# Patient Record
Sex: MALE | Race: WHITE | NOT HISPANIC OR LATINO | Employment: OTHER | URBAN - METROPOLITAN AREA
[De-identification: names, ages, dates, MRNs, and addresses within clinical notes are randomized per-mention and may not be internally consistent; named-entity substitution may affect disease eponyms.]

---

## 2017-03-31 ENCOUNTER — ALLSCRIPTS OFFICE VISIT (OUTPATIENT)
Dept: OTHER | Facility: OTHER | Age: 47
End: 2017-03-31

## 2017-12-27 ENCOUNTER — GENERIC CONVERSION - ENCOUNTER (OUTPATIENT)
Dept: OTHER | Facility: OTHER | Age: 47
End: 2017-12-27

## 2018-01-12 VITALS
SYSTOLIC BLOOD PRESSURE: 132 MMHG | OXYGEN SATURATION: 98 % | TEMPERATURE: 97.3 F | DIASTOLIC BLOOD PRESSURE: 94 MMHG | HEART RATE: 82 BPM | WEIGHT: 187.44 LBS

## 2018-01-24 VITALS
HEART RATE: 79 BPM | DIASTOLIC BLOOD PRESSURE: 98 MMHG | WEIGHT: 178 LBS | SYSTOLIC BLOOD PRESSURE: 140 MMHG | OXYGEN SATURATION: 97 % | TEMPERATURE: 97.7 F | BODY MASS INDEX: 27.94 KG/M2 | HEIGHT: 67 IN | RESPIRATION RATE: 14 BRPM

## 2018-04-30 DIAGNOSIS — K21.9 GASTROESOPHAGEAL REFLUX DISEASE, ESOPHAGITIS PRESENCE NOT SPECIFIED: Primary | ICD-10-CM

## 2018-04-30 RX ORDER — OMEPRAZOLE 40 MG/1
CAPSULE, DELAYED RELEASE ORAL
COMMUNITY
Start: 2012-05-25 | End: 2018-04-30 | Stop reason: SDUPTHER

## 2018-05-01 RX ORDER — OMEPRAZOLE 40 MG/1
40 CAPSULE, DELAYED RELEASE ORAL DAILY
Qty: 30 CAPSULE | Refills: 0 | Status: SHIPPED | OUTPATIENT
Start: 2018-05-01 | End: 2018-08-02 | Stop reason: SDUPTHER

## 2018-06-20 ENCOUNTER — OFFICE VISIT (OUTPATIENT)
Dept: FAMILY MEDICINE CLINIC | Facility: OTHER | Age: 48
End: 2018-06-20
Payer: COMMERCIAL

## 2018-06-20 VITALS
TEMPERATURE: 98.3 F | HEIGHT: 67 IN | WEIGHT: 180.06 LBS | OXYGEN SATURATION: 96 % | SYSTOLIC BLOOD PRESSURE: 114 MMHG | HEART RATE: 96 BPM | BODY MASS INDEX: 28.26 KG/M2 | DIASTOLIC BLOOD PRESSURE: 80 MMHG

## 2018-06-20 DIAGNOSIS — E78.1 ESSENTIAL HYPERTRIGLYCERIDEMIA: ICD-10-CM

## 2018-06-20 DIAGNOSIS — R53.83 FATIGUE, UNSPECIFIED TYPE: ICD-10-CM

## 2018-06-20 DIAGNOSIS — Z00.00 WELL ADULT EXAM: Primary | ICD-10-CM

## 2018-06-20 DIAGNOSIS — Z13.1 SCREENING FOR DIABETES MELLITUS: ICD-10-CM

## 2018-06-20 DIAGNOSIS — K11.20 SUBMANDIBULAR GLAND INFLAMMATION: ICD-10-CM

## 2018-06-20 PROCEDURE — 99396 PREV VISIT EST AGE 40-64: CPT | Performed by: FAMILY MEDICINE

## 2018-06-20 NOTE — PROGRESS NOTES
Subjective:      Patient ID: Rico Austin is a 57 y o  male  HPI   Pt presents for annual physical/wellness exam  Overall health good  Regular eye/dental exams  Diet: healthy, diverse -- protein from lean meat/fish   Exercise: aerobics/stretching 2-3x per wk  Colonoscopy: going 8/1 with Dr Lolita Gallegos  Last PSA: n/a  Vaccines: Tdap 2012      The following portions of the patient's history were reviewed and updated as appropriate: allergies, current medications, past family history, past medical history, past social history, past surgical history and problem list       Current Outpatient Prescriptions:     omeprazole (PriLOSEC) 40 MG capsule, Take 1 capsule (40 mg total) by mouth daily, Disp: 30 capsule, Rfl: 0        Review of Systems   Constitutional: Positive for fatigue  Negative for appetite change, fever and unexpected weight change  HENT: Negative for congestion, dental problem, ear pain, postnasal drip, sore throat and tinnitus  Eyes: Negative for pain, discharge and visual disturbance  Respiratory: Negative for cough, shortness of breath and wheezing  Cardiovascular: Negative for chest pain, palpitations and leg swelling  Gastrointestinal: Negative for abdominal pain, constipation, diarrhea, nausea and vomiting  Endocrine: Negative for cold intolerance and heat intolerance  Genitourinary: Negative for difficulty urinating, dysuria, flank pain and urgency  Musculoskeletal: Negative for arthralgias, back pain, joint swelling and myalgias  Skin: Negative for rash and wound  Allergic/Immunologic: Negative for immunocompromised state  Neurological: Negative for dizziness, syncope, speech difficulty, weakness and numbness  Hematological: Positive for adenopathy (submandibular)  Does not bruise/bleed easily  Psychiatric/Behavioral: Negative for confusion, dysphoric mood and sleep disturbance  The patient is not nervous/anxious            Objective:      /80 (BP Location: Right arm, Patient Position: Sitting, Cuff Size: Adult)   Pulse 96   Temp 98 3 °F (36 8 °C) (Tympanic)   Ht 5' 7" (1 702 m)   Wt 81 7 kg (180 lb 1 oz)   SpO2 96%   BMI 28 20 kg/m²          Physical Exam   Constitutional: He is oriented to person, place, and time  He appears well-developed and well-nourished  No distress  HENT:   Head: Normocephalic and atraumatic  Right Ear: External ear normal    Left Ear: External ear normal    Nose: Nose normal    Mouth/Throat: Oropharynx is clear and moist  No oropharyngeal exudate  Eyes: Conjunctivae and EOM are normal  Pupils are equal, round, and reactive to light  No scleral icterus  Neck: Normal range of motion  Neck supple  No thyromegaly present  Cardiovascular: Normal rate, regular rhythm and normal heart sounds  No murmur heard  Pulmonary/Chest: Effort normal and breath sounds normal  No respiratory distress  He has no wheezes  He has no rales  Abdominal: Soft  Bowel sounds are normal  He exhibits no mass  There is no tenderness  Musculoskeletal: Normal range of motion  He exhibits no edema or tenderness  Lymphadenopathy:        Head (right side): Submandibular adenopathy present  No submental and no occipital adenopathy present  Head (left side): Submandibular adenopathy present  No submental and no occipital adenopathy present  He has no cervical adenopathy  Neurological: He is alert and oriented to person, place, and time  He has normal reflexes  No cranial nerve deficit  Skin: Skin is warm and dry  No rash noted  No erythema  Psychiatric: He has a normal mood and affect  His behavior is normal    Vitals reviewed  Assessment/Plan:   Diagnoses and all orders for this visit:    Well adult exam  -     Comprehensive metabolic panel; Future  -     Lipid panel; Future  -     TSH, 3rd generation with T4 reflex; Future  -     CBC; Future  -     Vitamin D 25 hydroxy;  Future    Essential hypertriglyceridemia  -     Lipid panel; Future  -     TSH, 3rd generation with T4 reflex; Future    Screening for diabetes mellitus  -     Comprehensive metabolic panel; Future    Fatigue, unspecified type  -     TSH, 3rd generation with T4 reflex; Future  -     CBC; Future  -     Vitamin D 25 hydroxy; Future    Submandibular gland inflammation  -     US head neck soft tissue; Future          Healthy appearing 81-year-old male presents for physical exam   Reviewed the importance of healthy diet and regular exercise in maintaining good health  Patient to continue with regular eye/dental exams  Routine blood work ordered to assess lipid status and fasting glucose  Additional labs ordered (TSH, CBC, vitamin-D) in the setting of fatigue; ultrasound of head and neck ordered in the setting of submandibular gland inflammation  RTO 1 year for complete physical exam, sooner for any concerns or complications  The patient indicates understanding of these issues and agrees with the plan        Jeniffer Muñoz DO

## 2018-08-01 DIAGNOSIS — K21.9 GASTROESOPHAGEAL REFLUX DISEASE, ESOPHAGITIS PRESENCE NOT SPECIFIED: ICD-10-CM

## 2018-08-01 LAB
25(OH)D3 SERPL-MCNC: 34 NG/ML (ref 30–100)
ALBUMIN SERPL-MCNC: 4.5 G/DL (ref 3.6–5.1)
ALBUMIN/GLOB SERPL: 2 (CALC) (ref 1–2.5)
ALP SERPL-CCNC: 63 U/L (ref 40–115)
ALT SERPL-CCNC: 37 U/L (ref 9–46)
AST SERPL-CCNC: 22 U/L (ref 10–40)
BASOPHILS # BLD AUTO: 20 CELLS/UL (ref 0–200)
BASOPHILS NFR BLD AUTO: 0.3 %
BILIRUB SERPL-MCNC: 1.3 MG/DL (ref 0.2–1.2)
BUN SERPL-MCNC: 9 MG/DL (ref 7–25)
BUN/CREAT SERPL: ABNORMAL (CALC) (ref 6–22)
CALCIUM SERPL-MCNC: 9.6 MG/DL (ref 8.6–10.3)
CHLORIDE SERPL-SCNC: 103 MMOL/L (ref 98–110)
CHOLEST SERPL-MCNC: 171 MG/DL
CHOLEST/HDLC SERPL: 5.2 (CALC)
CO2 SERPL-SCNC: 30 MMOL/L (ref 20–31)
CREAT SERPL-MCNC: 0.73 MG/DL (ref 0.6–1.35)
EOSINOPHIL # BLD AUTO: 238 CELLS/UL (ref 15–500)
EOSINOPHIL NFR BLD AUTO: 3.6 %
ERYTHROCYTE [DISTWIDTH] IN BLOOD BY AUTOMATED COUNT: 12.7 % (ref 11–15)
GLOBULIN SER CALC-MCNC: 2.3 G/DL (CALC) (ref 1.9–3.7)
GLUCOSE SERPL-MCNC: 130 MG/DL (ref 65–99)
HCT VFR BLD AUTO: 43.8 % (ref 38.5–50)
HDLC SERPL-MCNC: 33 MG/DL
HGB BLD-MCNC: 15 G/DL (ref 13.2–17.1)
LDLC SERPL CALC-MCNC: 110 MG/DL (CALC)
LYMPHOCYTES # BLD AUTO: 1307 CELLS/UL (ref 850–3900)
LYMPHOCYTES NFR BLD AUTO: 19.8 %
MCH RBC QN AUTO: 31.5 PG (ref 27–33)
MCHC RBC AUTO-ENTMCNC: 34.2 G/DL (ref 32–36)
MCV RBC AUTO: 92 FL (ref 80–100)
MONOCYTES # BLD AUTO: 607 CELLS/UL (ref 200–950)
MONOCYTES NFR BLD AUTO: 9.2 %
NEUTROPHILS # BLD AUTO: 4429 CELLS/UL (ref 1500–7800)
NEUTROPHILS NFR BLD AUTO: 67.1 %
NONHDLC SERPL-MCNC: 138 MG/DL (CALC)
PLATELET # BLD AUTO: 198 THOUSAND/UL (ref 140–400)
PMV BLD REES-ECKER: 11 FL (ref 7.5–12.5)
POTASSIUM SERPL-SCNC: 3.9 MMOL/L (ref 3.5–5.3)
PROT SERPL-MCNC: 6.8 G/DL (ref 6.1–8.1)
RBC # BLD AUTO: 4.76 MILLION/UL (ref 4.2–5.8)
SL AMB EGFR AFRICAN AMERICAN: 129 ML/MIN/1.73M2
SL AMB EGFR NON AFRICAN AMERICAN: 111 ML/MIN/1.73M2
SODIUM SERPL-SCNC: 141 MMOL/L (ref 135–146)
TRIGL SERPL-MCNC: 160 MG/DL
TSH SERPL-ACNC: 2.39 MIU/L (ref 0.4–4.5)
WBC # BLD AUTO: 6.6 THOUSAND/UL (ref 3.8–10.8)

## 2018-08-02 ENCOUNTER — HOSPITAL ENCOUNTER (OUTPATIENT)
Dept: ULTRASOUND IMAGING | Facility: HOSPITAL | Age: 48
Discharge: HOME | End: 2018-08-02
Attending: FAMILY MEDICINE
Payer: COMMERCIAL

## 2018-08-02 DIAGNOSIS — K11.20 SUBMANDIBULAR GLAND INFLAMMATION: ICD-10-CM

## 2018-08-02 DIAGNOSIS — K21.9 GASTROESOPHAGEAL REFLUX DISEASE, ESOPHAGITIS PRESENCE NOT SPECIFIED: ICD-10-CM

## 2018-08-02 PROCEDURE — 76536 US EXAM OF HEAD AND NECK: CPT

## 2018-08-02 RX ORDER — OMEPRAZOLE 40 MG/1
40 CAPSULE, DELAYED RELEASE ORAL DAILY
Qty: 30 CAPSULE | Refills: 0 | OUTPATIENT
Start: 2018-08-02

## 2018-08-02 RX ORDER — OMEPRAZOLE 40 MG/1
40 CAPSULE, DELAYED RELEASE ORAL DAILY
Qty: 30 CAPSULE | Refills: 0 | Status: SHIPPED | OUTPATIENT
Start: 2018-08-02 | End: 2018-08-30 | Stop reason: SDUPTHER

## 2018-08-06 ENCOUNTER — TELEPHONE (OUTPATIENT)
Dept: FAMILY MEDICINE CLINIC | Facility: OTHER | Age: 48
End: 2018-08-06

## 2018-08-06 DIAGNOSIS — R73.01 ELEVATED FASTING GLUCOSE: Primary | ICD-10-CM

## 2018-08-06 NOTE — TELEPHONE ENCOUNTER
Pt notified  Appointment made  He will try to have A1C done before appointment     ----- Message from Shandra Garcia DO sent at 8/6/2018  9:00 AM EDT -----  Please inform pt that blood sugar elevated on this fasting sample -- recommend additional labs (A1c - ordered) to evaluate further  Follow up after this blood work is complete to discuss findings    Thanks!   Shandra Garcia DO

## 2018-08-06 NOTE — TELEPHONE ENCOUNTER
Pt notified     ----- Message from Jeniffer Muñoz DO sent at 8/6/2018  9:01 AM EDT -----  US head and neck unremarkable, please inform pt    Thanks!   Jeniffer Muñoz DO

## 2018-08-08 ENCOUNTER — OFFICE VISIT (OUTPATIENT)
Dept: FAMILY MEDICINE CLINIC | Facility: OTHER | Age: 48
End: 2018-08-08
Payer: COMMERCIAL

## 2018-08-08 VITALS
BODY MASS INDEX: 28.49 KG/M2 | WEIGHT: 181.5 LBS | HEART RATE: 69 BPM | DIASTOLIC BLOOD PRESSURE: 80 MMHG | TEMPERATURE: 98 F | SYSTOLIC BLOOD PRESSURE: 116 MMHG | HEIGHT: 67 IN | OXYGEN SATURATION: 95 %

## 2018-08-08 DIAGNOSIS — R73.01 IMPAIRED FASTING GLUCOSE: Primary | ICD-10-CM

## 2018-08-08 PROCEDURE — 3008F BODY MASS INDEX DOCD: CPT | Performed by: FAMILY MEDICINE

## 2018-08-08 PROCEDURE — 99213 OFFICE O/P EST LOW 20 MIN: CPT | Performed by: FAMILY MEDICINE

## 2018-08-08 NOTE — PROGRESS NOTES
Subjective:      Patient ID: Amada Meyers is a 57 y o  male  Noted to have elevated fasting glucose on blood work  Here to discuss      Blood Sugar Problem   This is a new problem  The current episode started 1 to 4 weeks ago  Pertinent negatives include no abdominal pain, anorexia, arthralgias, change in bowel habit, chest pain, chills, congestion, coughing, diaphoresis, fatigue, fever, headaches, joint swelling, myalgias, nausea, neck pain, numbness, rash, sore throat, swollen glands, urinary symptoms, vertigo, visual change, vomiting or weakness  Exacerbated by: poor diet  He has tried nothing for the symptoms  The following portions of the patient's history were reviewed and updated as appropriate: allergies, current medications, past family history, past medical history, past social history, past surgical history and problem list       Current Outpatient Prescriptions:     omeprazole (PriLOSEC) 40 MG capsule, Take 1 capsule (40 mg total) by mouth daily, Disp: 30 capsule, Rfl: 0        Review of Systems   Constitutional: Negative for activity change, chills, diaphoresis, fatigue and fever  HENT: Negative for congestion, ear pain, sinus pain and sore throat  Eyes: Negative for pain, itching and visual disturbance  Respiratory: Negative for cough and shortness of breath  Cardiovascular: Negative for chest pain, palpitations and leg swelling  Gastrointestinal: Negative for abdominal pain, anorexia, change in bowel habit, constipation, diarrhea, nausea and vomiting  Endocrine: Negative for cold intolerance and heat intolerance  Genitourinary: Negative for dysuria  Musculoskeletal: Negative for arthralgias, joint swelling, myalgias and neck pain  Skin: Negative for color change and rash  Neurological: Negative for dizziness, vertigo, syncope, weakness, numbness and headaches  Hematological: Negative for adenopathy  Psychiatric/Behavioral: Negative for dysphoric mood   The patient is not nervous/anxious  Objective:      /80 (BP Location: Left arm, Patient Position: Sitting, Cuff Size: Adult)   Pulse 69   Temp 98 °F (36 7 °C) (Tympanic)   Ht 5' 7" (1 702 m)   Wt 82 3 kg (181 lb 8 oz)   SpO2 95%   BMI 28 43 kg/m²          Physical Exam   Constitutional: He is oriented to person, place, and time  He appears well-developed and well-nourished  No distress  HENT:   Head: Normocephalic and atraumatic  Mouth/Throat: Oropharynx is clear and moist    Eyes: Conjunctivae and EOM are normal  Pupils are equal, round, and reactive to light  No scleral icterus  Neck: Normal range of motion  Neck supple  Cardiovascular: Normal rate, regular rhythm and normal heart sounds  No murmur heard  Pulmonary/Chest: Effort normal and breath sounds normal  No respiratory distress  Abdominal: Soft  Bowel sounds are normal  He exhibits no distension  There is no tenderness  Musculoskeletal: Normal range of motion  He exhibits no edema  Lymphadenopathy:     He has no cervical adenopathy  Neurological: He is alert and oriented to person, place, and time  Skin: Skin is warm and dry  No rash noted  Psychiatric: He has a normal mood and affect  His behavior is normal    Vitals reviewed  Assessment/Plan:   Diagnoses and all orders for this visit:    Impaired fasting glucose  -     Ambulatory referral to medical nutrition therapy for diabetes; Future          53-year-old male presents to review blood work that noted elevated fasting glucose  Fasting glucose value of 130 suggests new onset type 2 diabetes  Patient has lab slip to have hemoglobin A1c checked as soon as possible  Referral made to diabetic nutrition therapy as I am hopeful we can control this issue with lifestyle modifications  If hemoglobin A1c is less than 7 will pursue lifestyle modifications, if greater than 7 will need to consider metformin    If lifestyle modifications are primary means of control, would recommend patient follow up in 6 months for repeat blood work  RTO 3-6 months for diabetic follow-up    The patient indicates understanding of these issues and agrees with the plan          Galina Reasons, DO

## 2018-08-09 LAB — HBA1C MFR BLD: 5.6 % OF TOTAL HGB

## 2018-08-13 DIAGNOSIS — R73.01 IMPAIRED FASTING GLUCOSE: Primary | ICD-10-CM

## 2018-08-14 ENCOUNTER — TELEPHONE (OUTPATIENT)
Dept: FAMILY MEDICINE CLINIC | Facility: OTHER | Age: 48
End: 2018-08-14

## 2018-08-14 NOTE — TELEPHONE ENCOUNTER
Left message for patient to return call    ----- Message from Jeniffer Muñoz DO sent at 8/13/2018  4:50 PM EDT -----  Please inform pt that A1c was 5 6 -- Excellent! Will proceed as if he has "impaired fasting glucose" or prediabetes  Keep appt with nutritionist and plan to recheck labs in 3 months  Fasting lab orders placed  Thanks!   Dr Garett Márquez

## 2018-08-30 DIAGNOSIS — K21.9 GASTROESOPHAGEAL REFLUX DISEASE, ESOPHAGITIS PRESENCE NOT SPECIFIED: ICD-10-CM

## 2018-08-30 RX ORDER — OMEPRAZOLE 40 MG/1
40 CAPSULE, DELAYED RELEASE ORAL DAILY
Qty: 30 CAPSULE | Refills: 1 | Status: SHIPPED | OUTPATIENT
Start: 2018-08-30 | End: 2018-10-29 | Stop reason: SDUPTHER

## 2018-10-29 DIAGNOSIS — K21.9 GASTROESOPHAGEAL REFLUX DISEASE, ESOPHAGITIS PRESENCE NOT SPECIFIED: ICD-10-CM

## 2018-10-29 RX ORDER — OMEPRAZOLE 40 MG/1
40 CAPSULE, DELAYED RELEASE ORAL DAILY
Qty: 30 CAPSULE | Refills: 3 | Status: SHIPPED | OUTPATIENT
Start: 2018-10-29 | End: 2019-03-01 | Stop reason: SDUPTHER

## 2018-11-08 ENCOUNTER — OFFICE VISIT (OUTPATIENT)
Dept: FAMILY MEDICINE CLINIC | Facility: OTHER | Age: 48
End: 2018-11-08
Payer: COMMERCIAL

## 2018-11-08 VITALS
HEIGHT: 67 IN | WEIGHT: 170.56 LBS | SYSTOLIC BLOOD PRESSURE: 122 MMHG | DIASTOLIC BLOOD PRESSURE: 100 MMHG | OXYGEN SATURATION: 98 % | HEART RATE: 71 BPM | TEMPERATURE: 97.9 F | BODY MASS INDEX: 26.77 KG/M2

## 2018-11-08 DIAGNOSIS — R73.01 IMPAIRED FASTING GLUCOSE: Primary | ICD-10-CM

## 2018-11-08 DIAGNOSIS — Z28.21 REFUSED INFLUENZA VACCINE: ICD-10-CM

## 2018-11-08 PROCEDURE — 99213 OFFICE O/P EST LOW 20 MIN: CPT | Performed by: FAMILY MEDICINE

## 2018-11-08 PROCEDURE — 3008F BODY MASS INDEX DOCD: CPT | Performed by: FAMILY MEDICINE

## 2018-11-08 NOTE — PROGRESS NOTES
Subjective:      Patient ID: Julio Bates is a 57 y o  male  Blood Sugar Problem   This is a recurrent problem  The current episode started more than 1 month ago  The problem has been gradually improving  Pertinent negatives include no abdominal pain, anorexia, arthralgias, change in bowel habit, chest pain, chills, congestion, coughing, diaphoresis, fatigue, fever, headaches, joint swelling, myalgias, nausea, neck pain, numbness, rash, sore throat, swollen glands, urinary symptoms, vertigo, visual change, vomiting or weakness  Exacerbated by: poor diet, high carb intake  Treatments tried: cut out carbs, exercise  The treatment provided moderate relief  The following portions of the patient's history were reviewed and updated as appropriate: allergies, current medications, past family history, past medical history, past social history, past surgical history and problem list       Current Outpatient Prescriptions:     omeprazole (PriLOSEC) 40 MG capsule, Take 1 capsule (40 mg total) by mouth daily, Disp: 30 capsule, Rfl: 3      Review of Systems   Constitutional: Negative for activity change, chills, diaphoresis, fatigue and fever  HENT: Negative for congestion, ear pain, sinus pain and sore throat  Eyes: Negative for pain, itching and visual disturbance  Respiratory: Negative for cough and shortness of breath  Cardiovascular: Negative for chest pain, palpitations and leg swelling  Gastrointestinal: Negative for abdominal pain, anorexia, change in bowel habit, constipation, diarrhea, nausea and vomiting  Endocrine: Negative for cold intolerance and heat intolerance  Genitourinary: Negative for dysuria  Musculoskeletal: Negative for arthralgias, joint swelling, myalgias and neck pain  Skin: Negative for color change and rash  Neurological: Negative for dizziness, vertigo, syncope, weakness, numbness and headaches  Hematological: Negative for adenopathy     Psychiatric/Behavioral: Negative for dysphoric mood  The patient is not nervous/anxious  Objective:      /100 (BP Location: Left arm, Patient Position: Sitting, Cuff Size: Large)   Pulse 71   Temp 97 9 °F (36 6 °C) (Tympanic)   Ht 5' 7" (1 702 m)   Wt 77 4 kg (170 lb 9 oz)   SpO2 98%   BMI 26 71 kg/m²   REPEAT /88       Physical Exam   Constitutional: He is oriented to person, place, and time  He appears well-developed and well-nourished  No distress  HENT:   Head: Normocephalic and atraumatic  Mouth/Throat: Oropharynx is clear and moist    Eyes: Pupils are equal, round, and reactive to light  Conjunctivae and EOM are normal  No scleral icterus  Neck: Normal range of motion  Neck supple  Cardiovascular: Normal rate, regular rhythm and normal heart sounds  No murmur heard  Pulmonary/Chest: Effort normal and breath sounds normal  No respiratory distress  He has no wheezes  Abdominal: Soft  Bowel sounds are normal  He exhibits no distension  There is no tenderness  Musculoskeletal: Normal range of motion  He exhibits no edema  Lymphadenopathy:     He has no cervical adenopathy  Neurological: He is alert and oriented to person, place, and time  No cranial nerve deficit  Coordination normal    Skin: Skin is warm and dry  No rash noted  No erythema  Psychiatric: He has a normal mood and affect  His behavior is normal        Lab Results   Component Value Date    K 3 9 07/31/2018     07/31/2018    CO2 30 07/31/2018    BUN 9 07/31/2018    CALCIUM 9 6 07/31/2018    ALKPHOS 63 07/31/2018       Lab Results   Component Value Date    HGBA1C 5 6 08/08/2018           Assessment/Plan:   Diagnoses and all orders for this visit:    Impaired fasting glucose    Refused influenza vaccine  -     SYRINGE/SINGLE-DOSE VIAL: influenza vaccine, [id number], quadrivalent, 0 5 mL, preservative-free, for patients 3+ yr [facility])          72-year-old male with impaired fasting glucose presents for follow-up    Patient states that he has changed his diet drastically and lost approximately 10 lb since his last visit in August   He is feeling well at this time--denies any symptoms of hyper or hypoglycemia  Patient has orders to recheck CMP and A1c at his earliest convenience  If A1c remains less than 7, will recheck labs in 6-7 months with his physical exam     RTO June 2019 for complete physical exam     The patient indicates understanding of these issues and agrees with the plan          Richmond Phelan, DO

## 2018-11-14 LAB
ALBUMIN SERPL-MCNC: 4.4 G/DL (ref 3.6–5.1)
ALBUMIN/GLOB SERPL: 2.1 (CALC) (ref 1–2.5)
ALP SERPL-CCNC: 53 U/L (ref 40–115)
ALT SERPL-CCNC: 40 U/L (ref 9–46)
AST SERPL-CCNC: 21 U/L (ref 10–40)
BILIRUB SERPL-MCNC: 1.4 MG/DL (ref 0.2–1.2)
BUN SERPL-MCNC: 11 MG/DL (ref 7–25)
BUN/CREAT SERPL: ABNORMAL (CALC) (ref 6–22)
CALCIUM SERPL-MCNC: 9.5 MG/DL (ref 8.6–10.3)
CHLORIDE SERPL-SCNC: 103 MMOL/L (ref 98–110)
CO2 SERPL-SCNC: 30 MMOL/L (ref 20–32)
CREAT SERPL-MCNC: 1.02 MG/DL (ref 0.6–1.35)
GLOBULIN SER CALC-MCNC: 2.1 G/DL (CALC) (ref 1.9–3.7)
GLUCOSE SERPL-MCNC: 121 MG/DL (ref 65–99)
HBA1C MFR BLD: 5.7 % OF TOTAL HGB
POTASSIUM SERPL-SCNC: 3.8 MMOL/L (ref 3.5–5.3)
PROT SERPL-MCNC: 6.5 G/DL (ref 6.1–8.1)
SL AMB EGFR AFRICAN AMERICAN: 102 ML/MIN/1.73M2
SL AMB EGFR NON AFRICAN AMERICAN: 88 ML/MIN/1.73M2
SODIUM SERPL-SCNC: 141 MMOL/L (ref 135–146)

## 2019-02-19 ENCOUNTER — OFFICE VISIT (OUTPATIENT)
Dept: FAMILY MEDICINE CLINIC | Facility: OTHER | Age: 49
End: 2019-02-19
Payer: COMMERCIAL

## 2019-02-19 VITALS
BODY MASS INDEX: 26.73 KG/M2 | HEART RATE: 80 BPM | TEMPERATURE: 99.2 F | SYSTOLIC BLOOD PRESSURE: 126 MMHG | DIASTOLIC BLOOD PRESSURE: 90 MMHG | HEIGHT: 67 IN | WEIGHT: 170.3 LBS | OXYGEN SATURATION: 98 %

## 2019-02-19 DIAGNOSIS — J01.40 ACUTE NON-RECURRENT PANSINUSITIS: Primary | ICD-10-CM

## 2019-02-19 PROCEDURE — 3008F BODY MASS INDEX DOCD: CPT | Performed by: FAMILY MEDICINE

## 2019-02-19 PROCEDURE — 1036F TOBACCO NON-USER: CPT | Performed by: FAMILY MEDICINE

## 2019-02-19 PROCEDURE — 99214 OFFICE O/P EST MOD 30 MIN: CPT | Performed by: FAMILY MEDICINE

## 2019-02-19 RX ORDER — BENZONATATE 100 MG/1
100 CAPSULE ORAL 3 TIMES DAILY PRN
Qty: 20 CAPSULE | Refills: 0 | Status: SHIPPED | OUTPATIENT
Start: 2019-02-19 | End: 2019-10-22

## 2019-02-19 RX ORDER — AMOXICILLIN AND CLAVULANATE POTASSIUM 875; 125 MG/1; MG/1
1 TABLET, FILM COATED ORAL EVERY 12 HOURS SCHEDULED
Qty: 20 TABLET | Refills: 0 | Status: SHIPPED | OUTPATIENT
Start: 2019-02-19 | End: 2019-03-01

## 2019-02-19 NOTE — PROGRESS NOTES
Subjective:      Patient ID: Stephen Adair is a 57 y o  male  Cough   This is a new problem  The current episode started in the past 7 days (6-7 days)  The problem has been gradually worsening  The problem occurs constantly  The cough is productive of sputum  Associated symptoms include ear congestion, headaches (sinus), nasal congestion, postnasal drip, a sore throat and sweats  Pertinent negatives include no chest pain, chills, ear pain, eye redness, fever, heartburn, hemoptysis, myalgias, rash, rhinorrhea, shortness of breath, weight loss or wheezing  Nothing aggravates the symptoms  He has tried OTC cough suppressant for the symptoms  The treatment provided no relief  There is no history of asthma, bronchiectasis or COPD  The following portions of the patient's history were reviewed and updated as appropriate: allergies, current medications, past family history, past medical history, past social history, past surgical history and problem list       Current Outpatient Medications:     omeprazole (PriLOSEC) 40 MG capsule, Take 1 capsule (40 mg total) by mouth daily, Disp: 30 capsule, Rfl: 3    amoxicillin-clavulanate (AUGMENTIN) 875-125 mg per tablet, Take 1 tablet by mouth every 12 (twelve) hours for 10 days, Disp: 20 tablet, Rfl: 0    benzonatate (TESSALON PERLES) 100 mg capsule, Take 1 capsule (100 mg total) by mouth 3 (three) times a day as needed for cough, Disp: 20 capsule, Rfl: 0      Review of Systems   Constitutional: Positive for fatigue  Negative for appetite change, chills, fever and weight loss  HENT: Positive for congestion, postnasal drip, sinus pressure, sinus pain and sore throat  Negative for drooling, ear pain, hearing loss, rhinorrhea and tinnitus  Eyes: Negative for pain, discharge, redness and itching  Respiratory: Positive for cough  Negative for hemoptysis, chest tightness, shortness of breath and wheezing      Cardiovascular: Negative for chest pain, palpitations and leg swelling  Gastrointestinal: Negative for abdominal pain, diarrhea, heartburn, nausea and vomiting  Genitourinary: Negative for dysuria and flank pain  Musculoskeletal: Negative for arthralgias and myalgias  Skin: Negative for rash  Neurological: Positive for headaches (sinus)  Negative for dizziness  Hematological: Negative for adenopathy  Psychiatric/Behavioral: Negative for confusion and dysphoric mood  The patient is not nervous/anxious  Objective:      /90 (BP Location: Left arm, Patient Position: Sitting, Cuff Size: Adult)   Pulse 80   Temp 99 2 °F (37 3 °C) (Tympanic)   Ht 5' 7" (1 702 m)   Wt 77 2 kg (170 lb 4 8 oz)   SpO2 98%   BMI 26 67 kg/m²          Physical Exam   Constitutional: He is oriented to person, place, and time  He appears well-developed and well-nourished  No distress  HENT:   Head: Normocephalic and atraumatic  Right Ear: Tympanic membrane is retracted  Left Ear: Tympanic membrane is retracted  Nose: Mucosal edema present  Right sinus exhibits maxillary sinus tenderness and frontal sinus tenderness  Left sinus exhibits maxillary sinus tenderness and frontal sinus tenderness  Mouth/Throat: Uvula is midline  No oropharyngeal exudate or posterior oropharyngeal erythema  Eyes: Pupils are equal, round, and reactive to light  Conjunctivae and EOM are normal  Right eye exhibits no discharge  Left eye exhibits no discharge  No scleral icterus  Neck: Normal range of motion  Neck supple  No thyromegaly present  Cardiovascular: Normal rate, regular rhythm and normal heart sounds  No murmur heard  Pulmonary/Chest: Effort normal and breath sounds normal  No respiratory distress  He has no wheezes  Abdominal: Soft  Bowel sounds are normal  He exhibits no distension  There is no tenderness  Musculoskeletal: Normal range of motion  He exhibits no edema, tenderness or deformity  Lymphadenopathy:     He has no cervical adenopathy     Neurological: He is alert and oriented to person, place, and time  He has normal reflexes  No cranial nerve deficit  Coordination normal    Skin: Skin is warm and dry  No rash noted  No erythema  No pallor  Psychiatric: He has a normal mood and affect  His behavior is normal    Vitals reviewed  Assessment/Plan:  Diagnoses and all orders for this visit:    Acute non-recurrent pansinusitis  -     amoxicillin-clavulanate (AUGMENTIN) 875-125 mg per tablet; Take 1 tablet by mouth every 12 (twelve) hours for 10 days  -     benzonatate (TESSALON PERLES) 100 mg capsule; Take 1 capsule (100 mg total) by mouth 3 (three) times a day as needed for cough          53-year-old male presents with acute sinusitis  Will empirically treat with 10 day course of oral Augmentin  Recommend supportive care with saltwater gargles and saline nasal spray  Tessalon Perles offered for cough  Patient should call if symptoms not improving in about 3-4 days  Return if symptoms worsen or fail to improve  The patient indicates understanding of these issues and agrees with the plan          Mart Joseph DO

## 2019-03-01 DIAGNOSIS — K21.9 GASTROESOPHAGEAL REFLUX DISEASE, ESOPHAGITIS PRESENCE NOT SPECIFIED: ICD-10-CM

## 2019-03-01 RX ORDER — OMEPRAZOLE 40 MG/1
40 CAPSULE, DELAYED RELEASE ORAL DAILY
Qty: 30 CAPSULE | Refills: 3 | Status: SHIPPED | OUTPATIENT
Start: 2019-03-01 | End: 2019-07-01 | Stop reason: SDUPTHER

## 2019-07-01 DIAGNOSIS — K21.9 GASTROESOPHAGEAL REFLUX DISEASE, ESOPHAGITIS PRESENCE NOT SPECIFIED: ICD-10-CM

## 2019-07-01 RX ORDER — OMEPRAZOLE 40 MG/1
40 CAPSULE, DELAYED RELEASE ORAL DAILY
Qty: 90 CAPSULE | Refills: 1 | Status: SHIPPED | OUTPATIENT
Start: 2019-07-01 | End: 2019-11-21 | Stop reason: SDUPTHER

## 2019-09-18 LAB
LEFT EYE DIABETIC RETINOPATHY: NORMAL
RIGHT EYE DIABETIC RETINOPATHY: NORMAL

## 2019-10-22 ENCOUNTER — OFFICE VISIT (OUTPATIENT)
Dept: FAMILY MEDICINE CLINIC | Facility: OTHER | Age: 49
End: 2019-10-22
Payer: COMMERCIAL

## 2019-10-22 VITALS
WEIGHT: 176.25 LBS | BODY MASS INDEX: 27.66 KG/M2 | OXYGEN SATURATION: 97 % | SYSTOLIC BLOOD PRESSURE: 132 MMHG | HEIGHT: 67 IN | DIASTOLIC BLOOD PRESSURE: 100 MMHG | TEMPERATURE: 98 F | HEART RATE: 96 BPM

## 2019-10-22 DIAGNOSIS — R10.31 RIGHT GROIN PAIN: Primary | ICD-10-CM

## 2019-10-22 DIAGNOSIS — J20.9 ACUTE BRONCHITIS, UNSPECIFIED ORGANISM: ICD-10-CM

## 2019-10-22 PROCEDURE — 3008F BODY MASS INDEX DOCD: CPT | Performed by: FAMILY MEDICINE

## 2019-10-22 PROCEDURE — 99214 OFFICE O/P EST MOD 30 MIN: CPT | Performed by: FAMILY MEDICINE

## 2019-10-22 RX ORDER — AZITHROMYCIN 250 MG/1
TABLET, FILM COATED ORAL
Qty: 6 TABLET | Refills: 0 | Status: SHIPPED | OUTPATIENT
Start: 2019-10-22 | End: 2019-10-27

## 2019-10-22 NOTE — PROGRESS NOTES
Subjective:      Patient ID: Michael France is a 54 y o  male  Groin Pain   The patient's primary symptoms include pelvic pain (right groin) and testicular pain  This is a recurrent problem  The current episode started more than 1 month ago  The problem occurs 2 to 4 times per day  The problem has been waxing and waning  The pain is medium  Associated symptoms include coughing, painful intercourse (occasionally) and a sore throat (scratchy)  Pertinent negatives include no abdominal pain, anorexia, chest pain, chills, constipation, diarrhea, discolored urine, dysuria, fever, flank pain, frequency, headaches, hematuria, hesitancy, joint pain, joint swelling, nausea, rash, shortness of breath, urgency, urinary retention or vomiting  The testicular pain affects the right testicle  The color of the testicles is normal  The symptoms are aggravated by activity  He has tried rest for the symptoms  The treatment provided no relief  He is sexually active  He never uses condoms  No, his partner does not have an STD  His past medical history is significant for an inguinal hernia (as child)  URI    This is a new problem  Episode onset: 8-10 days  The problem has been waxing and waning  There has been no fever  Associated symptoms include congestion, coughing, a plugged ear sensation, sneezing and a sore throat (scratchy)  Pertinent negatives include no abdominal pain, chest pain, diarrhea, dysuria, ear pain, headaches, joint pain, joint swelling, nausea, neck pain, rash, rhinorrhea, sinus pain, swollen glands, vomiting or wheezing  Treatments tried: mucinex  The treatment provided no relief         The following portions of the patient's history were reviewed and updated as appropriate: allergies, current medications, past family history, past medical history, past social history, past surgical history and problem list       Current Outpatient Medications:     omeprazole (PriLOSEC) 40 MG capsule, Take 1 capsule (40 mg total) by mouth daily, Disp: 90 capsule, Rfl: 1    azithromycin (ZITHROMAX) 250 mg tablet, Take 2 tabs PO day #1, then 1 tab PO daily x 4 days, Disp: 6 tablet, Rfl: 0      Review of Systems   Constitutional: Negative for activity change, chills, fatigue and fever  HENT: Positive for congestion, sneezing and sore throat (scratchy)  Negative for ear pain, rhinorrhea and sinus pain  Eyes: Negative for pain, itching and visual disturbance  Respiratory: Positive for cough  Negative for shortness of breath and wheezing  Cardiovascular: Negative for chest pain, palpitations and leg swelling  Gastrointestinal: Negative for abdominal pain, anorexia, constipation, diarrhea, nausea and vomiting  Endocrine: Negative for cold intolerance and heat intolerance  Genitourinary: Positive for pelvic pain (right groin) and testicular pain  Negative for dysuria, flank pain, frequency, hesitancy and urgency  Musculoskeletal: Negative for joint pain, myalgias and neck pain  Skin: Negative for color change and rash  Neurological: Negative for dizziness, syncope and headaches  Hematological: Negative for adenopathy  Psychiatric/Behavioral: Negative for dysphoric mood  The patient is not nervous/anxious  Objective:      /100 (BP Location: Right arm, Patient Position: Sitting, Cuff Size: Large)   Pulse 96   Temp 98 °F (36 7 °C) (Tympanic)   Ht 5' 7" (1 702 m)   Wt 79 9 kg (176 lb 4 oz)   SpO2 97%   BMI 27 60 kg/m²          Physical Exam   Constitutional: He is oriented to person, place, and time  He appears well-developed and well-nourished  No distress  Body mass index is 27 6 kg/m²  HENT:   Head: Normocephalic and atraumatic  Right Ear: External ear and ear canal normal  Tympanic membrane is retracted  Left Ear: External ear and ear canal normal  Tympanic membrane is retracted  Nose: Mucosal edema present   Right sinus exhibits no maxillary sinus tenderness and no frontal sinus tenderness  Left sinus exhibits no maxillary sinus tenderness and no frontal sinus tenderness  Mouth/Throat: Uvula is midline and mucous membranes are normal  Oropharyngeal exudate (PND/cobblestoning) present  No posterior oropharyngeal erythema  Eyes: Pupils are equal, round, and reactive to light  Conjunctivae and EOM are normal  No scleral icterus  Neck: Normal range of motion  Neck supple  Cardiovascular: Normal rate, regular rhythm and normal heart sounds  No murmur heard  Pulmonary/Chest: Effort normal  No respiratory distress  He has decreased breath sounds (coarse breath sounds b/l bases) in the right lower field and the left lower field  He has no wheezes  Abdominal: Soft  Bowel sounds are normal  He exhibits no distension  There is no tenderness  No hernia (None appreciated on exam)  Musculoskeletal: Normal range of motion  He exhibits no edema  Lymphadenopathy:     He has no cervical adenopathy  Neurological: He is alert and oriented to person, place, and time  No cranial nerve deficit  Coordination normal    Skin: Skin is warm and dry  No rash noted  Psychiatric: He has a normal mood and affect  His behavior is normal    Nursing note and vitals reviewed  Assessment/Plan:  Diagnoses and all orders for this visit:    Right groin pain  -     US scrotum and testicles; Future  -     Ambulatory referral to General Surgery; Future  -     SUSPECT HERNIA ALTHOUGH NONE APPRECIATED ON EXAM -- CHECK US SCROTUM/TESTICLES AND REFER TO SURG FOR EVAL IF US NEGATIVE    Acute bronchitis, unspecified organism  -     azithromycin (ZITHROMAX) 250 mg tablet; Take 2 tabs PO day #1, then 1 tab PO daily x 4 days  -     EMPIRICALLY TX WITH ZPAK;  SUPPORTIVE CARE WITH SALT WATER GARGLES AND SALINE NASAL SPRAY        BMI Counseling: Body mass index is 27 6 kg/m²   The BMI is above normal  Nutrition recommendations include decreasing portion sizes, encouraging healthy choices of fruits and vegetables, decreasing fast food intake, consuming healthier snacks, limiting drinks that contain sugar, moderation in carbohydrate intake, increasing intake of lean protein, reducing intake of saturated and trans fat and reducing intake of cholesterol  Exercise recommendations include moderate physical activity 150 minutes/week  Return in about 4 weeks (around 11/19/2019) for Annual physical     The patient indicates understanding of these issues and agrees with the plan            Sharlene Cunha, DO

## 2019-10-26 ENCOUNTER — HOSPITAL ENCOUNTER (OUTPATIENT)
Dept: ULTRASOUND IMAGING | Facility: HOSPITAL | Age: 49
Discharge: HOME | End: 2019-10-26
Attending: FAMILY MEDICINE
Payer: COMMERCIAL

## 2019-10-26 DIAGNOSIS — R10.31 RIGHT GROIN PAIN: ICD-10-CM

## 2019-10-26 PROCEDURE — 76870 US EXAM SCROTUM: CPT

## 2019-10-28 PROBLEM — R10.31 RIGHT GROIN PAIN: Status: ACTIVE | Noted: 2019-10-28

## 2019-10-29 ENCOUNTER — TELEPHONE (OUTPATIENT)
Dept: FAMILY MEDICINE CLINIC | Facility: OTHER | Age: 49
End: 2019-10-29

## 2019-10-29 NOTE — TELEPHONE ENCOUNTER
Pt aware    ----- Message from Nereyda Strong DO sent at 10/28/2019  3:01 PM EDT -----  Please inform pt that US scrotum/testicles was unremarkable  Continue to suspect inguinal hernia and recommend that he f/u with General Surgery  Thanks!   Nereyda Strong DO

## 2019-11-16 ENCOUNTER — HOSPITAL ENCOUNTER (OUTPATIENT)
Dept: MRI IMAGING | Facility: HOSPITAL | Age: 49
Discharge: HOME | End: 2019-11-16
Attending: SURGERY
Payer: COMMERCIAL

## 2019-11-16 DIAGNOSIS — R10.31 RIGHT GROIN PAIN: ICD-10-CM

## 2019-11-16 PROCEDURE — 72148 MRI LUMBAR SPINE W/O DYE: CPT

## 2019-11-21 ENCOUNTER — OFFICE VISIT (OUTPATIENT)
Dept: FAMILY MEDICINE CLINIC | Facility: OTHER | Age: 49
End: 2019-11-21
Payer: COMMERCIAL

## 2019-11-21 VITALS
SYSTOLIC BLOOD PRESSURE: 106 MMHG | HEART RATE: 100 BPM | OXYGEN SATURATION: 97 % | TEMPERATURE: 98.8 F | WEIGHT: 176 LBS | HEIGHT: 67 IN | DIASTOLIC BLOOD PRESSURE: 86 MMHG | BODY MASS INDEX: 27.62 KG/M2

## 2019-11-21 DIAGNOSIS — G47.00 INSOMNIA, UNSPECIFIED TYPE: ICD-10-CM

## 2019-11-21 DIAGNOSIS — R06.83 SNORING: ICD-10-CM

## 2019-11-21 DIAGNOSIS — K21.9 GASTROESOPHAGEAL REFLUX DISEASE, ESOPHAGITIS PRESENCE NOT SPECIFIED: ICD-10-CM

## 2019-11-21 DIAGNOSIS — E78.1 ESSENTIAL HYPERTRIGLYCERIDEMIA: ICD-10-CM

## 2019-11-21 DIAGNOSIS — Z11.4 SCREENING FOR HIV (HUMAN IMMUNODEFICIENCY VIRUS): ICD-10-CM

## 2019-11-21 DIAGNOSIS — Z00.00 ANNUAL PHYSICAL EXAM: Primary | ICD-10-CM

## 2019-11-21 DIAGNOSIS — M51.26 HERNIATED NUCLEUS PULPOSUS, L3-4 RIGHT: ICD-10-CM

## 2019-11-21 DIAGNOSIS — R73.01 IMPAIRED FASTING GLUCOSE: ICD-10-CM

## 2019-11-21 PROCEDURE — 99396 PREV VISIT EST AGE 40-64: CPT | Performed by: FAMILY MEDICINE

## 2019-11-21 RX ORDER — OMEPRAZOLE 40 MG/1
40 CAPSULE, DELAYED RELEASE ORAL DAILY
Qty: 90 CAPSULE | Refills: 1 | Status: SHIPPED | OUTPATIENT
Start: 2019-11-21 | End: 2020-05-27 | Stop reason: SDUPTHER

## 2019-11-21 RX ORDER — CYCLOBENZAPRINE HCL 5 MG
5 TABLET ORAL 3 TIMES DAILY PRN
Qty: 15 TABLET | Refills: 0 | Status: SHIPPED | OUTPATIENT
Start: 2019-11-21 | End: 2020-02-11

## 2019-11-21 NOTE — PATIENT INSTRUCTIONS

## 2019-11-21 NOTE — PROGRESS NOTES
ADULT ANNUAL [place] [address] [facility]    NAME: Beverly Lindo  AGE: 54 y o  SEX: male  : 1970     DATE: 2019     Assessment and Plan:     Problem List Items Addressed This Visit        Digestive    Esophageal reflux    Relevant Medications    omeprazole (PriLOSEC) 40 MG capsule       Endocrine    Impaired fasting glucose    Relevant Orders    Comprehensive metabolic panel    Hemoglobin A1C       Other    Essential hypertriglyceridemia    Relevant Orders    Comprehensive metabolic panel    Lipid panel      Other Visit Diagnoses     Annual physical exam    -  Primary    Screening for HIV (human immunodeficiency virus)        Relevant Orders    Human Immunodeficiency Virus 1/2 Antigen / Antibody ( Fourth Generation) with Reflex Testing    Herniated nucleus pulposus, L3-4 right        Relevant Medications    cyclobenzaprine (FLEXERIL) 5 mg tablet    Other Relevant Orders    Ambulatory referral to Physical Therapy    Snoring        Relevant Orders    Ambulatory referral to Sleep Medicine    Insomnia, unspecified type        Relevant Orders    Ambulatory referral to Sleep Medicine          Immunizations and preventive care screenings were discussed with patient today  Appropriate education was printed on patient's after visit summary  Counseling:  Alcohol/drug use: discussed moderation in alcohol intake, the recommendations for healthy alcohol use, and avoidance of illicit drug use  Dental Health: discussed importance of regular tooth brushing, flossing, and dental visits  Injury prevention: discussed safety/seat belts, safety helmets, smoke detectors, carbon dioxide detectors, and smoking near bedding or upholstery  Sexual health: discussed sexually transmitted diseases, partner selection, use of condoms, avoidance of unintended pregnancy, and contraceptive alternatives    · Exercise: the importance of regular exercise/physical activity was discussed  Recommend exercise 3-5 times per week for at least 30 minutes  Return in about 6 months (around 5/21/2020) for Recheck HLD, glucose  The patient indicates understanding of these issues and agrees with the plan  Chief Complaint:     Chief Complaint   Patient presents with    Annual Exam      History of Present Illness:     Adult Annual Physical   Patient here for a comprehensive physical exam  The patient reports no problems  Diet and Physical Activity  · Diet/Nutrition: well balanced diet, limited junk food, consuming 3-5 servings of fruits/vegetables daily, adequate fiber intake and adequate whole grain intake  · Exercise: strength training exercises, 3-4 times a week on average and 30-60 minutes on average  Depression Screening  PHQ-9 Depression Screening    PHQ-9:    Frequency of the following problems over the past two weeks:       Little interest or pleasure in doing things:  0 - not at all  Feeling down, depressed, or hopeless:  0 - not at all  PHQ-2 Score:  0       General Health  · Sleep: sleeps poorly, gets 4-6 hours of sleep on average, snores loudly and witnessed gasping  · Hearing: normal - bilateral   · Vision: goes for regular eye exams  · Dental: regular dental visits  [facility]  · Symptoms include: nocturia     Review of Systems:     Review of Systems   Constitutional: Negative for appetite change, fatigue, fever and unexpected weight change  HENT: Negative for congestion, dental problem, ear pain, postnasal drip, sore throat and tinnitus  Eyes: Negative for pain, discharge and visual disturbance  Respiratory: Negative for cough, shortness of breath and wheezing  Cardiovascular: Negative for chest pain, palpitations and leg swelling  Gastrointestinal: Negative for abdominal pain, constipation, diarrhea, nausea and vomiting  Endocrine: Negative for cold intolerance and heat intolerance     Genitourinary: Negative for difficulty urinating, dysuria, flank pain and urgency  Musculoskeletal: Negative for arthralgias, back pain, joint swelling and myalgias  Skin: Negative for rash and wound  Allergic/Immunologic: Negative for immunocompromised state  Neurological: Negative for dizziness, syncope, speech difficulty, weakness and numbness  Hematological: Negative for adenopathy  Does not bruise/bleed easily  Psychiatric/Behavioral: Negative for confusion, dysphoric mood and sleep disturbance  The patient is not nervous/anxious  Past Medical History:     Past Medical History:   Diagnosis Date    History of illicit drug use     cocaine noted in social history      Past Surgical History:     Past Surgical History:   Procedure Laterality Date    CHOLECYSTECTOMY      CHOLECYSTECTOMY      COLONOSCOPY W/ BIOPSIES  11/2011    polyps      Family History:     Family History   Problem Relation Age of Onset    Hyperlipidemia Father     Hypertension Mother     Diabetes Mother     Hyperlipidemia Mother       Social History:     Social History     Socioeconomic History    Marital status: /Civil Union     Spouse name: None    Number of children: None    Years of education: None    Highest education level: None   Occupational History    None   Social Needs    Financial resource strain: None    Food insecurity:     Worry: None     Inability: None    Transportation needs:     Medical: None     Non-medical: None   Tobacco Use    Smoking status: Former Smoker    Smokeless tobacco: Never Used    Tobacco comment: Vape   Substance and Sexual Activity    Alcohol use:  Yes     Alcohol/week: 2 0 standard drinks     Types: 2 Cans of beer per week     Comment: weekly    Drug use: No     Comment: not currently    Sexual activity: Yes     Partners: Female   Lifestyle    Physical activity:     Days per week: None     Minutes per session: None    Stress: None   Relationships    Social connections:     Talks on phone: None     Gets together: None     Attends [employer] service: None     Active member of club or organization: None     Attends meetings of clubs or organizations: None     Relationship status: None    Intimate partner violence:     Fear of current or ex partner: None     Emotionally abused: None     Physically abused: None     Forced sexual activity: None   Other Topics Concern    None   Social History Narrative    None      Current Medications:     Current Outpatient Medications   Medication Sig Dispense Refill    omeprazole (PriLOSEC) 40 MG capsule Take 1 capsule (40 mg total) by mouth daily 90 capsule 1    cyclobenzaprine (FLEXERIL) 5 mg tablet Take 1 tablet (5 mg total) by mouth 3 (three) times a day as needed for muscle spasms 15 tablet 0     No current facility-administered medications for this visit  Allergies:     No Known Allergies   Physical Exam:     /86 (BP Location: Left arm, Patient Position: Sitting, Cuff Size: Large)   Pulse 100   Temp 98 8 °F (37 1 °C) (Tympanic)   Ht 5' 7 25" (1 708 m)   Wt 79 8 kg (176 lb)   SpO2 97%   BMI 27 36 kg/m²     Physical Exam   Constitutional: He is oriented to person, place, and time  He appears well-developed and well-nourished  No distress  Body mass index is 27 36 kg/m²  HENT:   Head: Normocephalic and atraumatic  Right Ear: Hearing, tympanic membrane, external ear and ear canal normal    Left Ear: Hearing, tympanic membrane, external ear and ear canal normal    Nose: Nose normal    Mouth/Throat: Uvula is midline, oropharynx is clear and moist and mucous membranes are normal  No oropharyngeal exudate  Eyes: Pupils are equal, round, and reactive to light  Conjunctivae and EOM are normal  No scleral icterus  Neck: Normal range of motion  Neck supple  No thyromegaly present  Cardiovascular: Normal rate, regular rhythm and normal heart sounds  No murmur heard  Pulmonary/Chest: Effort normal and breath sounds normal  No respiratory distress   He has no wheezes  He has no rales  Abdominal: Soft  Bowel sounds are normal  He exhibits no distension and no mass  There is no tenderness  Musculoskeletal: He exhibits no edema, tenderness or deformity  Lymphadenopathy:     He has no cervical adenopathy  Neurological: He is alert and oriented to person, place, and time  He has normal reflexes  No cranial nerve deficit  Coordination normal    Skin: Skin is warm and dry  No rash noted  No erythema  No pallor  Psychiatric: He has a normal mood and affect  His behavior is normal    Nursing note and vitals reviewed        Josie Abdi [facility] [address] [facility] [address] [address]

## 2019-12-10 ENCOUNTER — EVALUATION (OUTPATIENT)
Dept: PHYSICAL THERAPY | Facility: REHABILITATION | Age: 49
End: 2019-12-10
Payer: COMMERCIAL

## 2019-12-10 DIAGNOSIS — G89.29 CHRONIC RIGHT-SIDED LOW BACK PAIN WITH RIGHT-SIDED SCIATICA: Primary | ICD-10-CM

## 2019-12-10 DIAGNOSIS — M51.26 HERNIATED NUCLEUS PULPOSUS, L3-4 RIGHT: ICD-10-CM

## 2019-12-10 DIAGNOSIS — M54.41 CHRONIC RIGHT-SIDED LOW BACK PAIN WITH RIGHT-SIDED SCIATICA: Primary | ICD-10-CM

## 2019-12-10 PROCEDURE — 97162 PT EVAL MOD COMPLEX 30 MIN: CPT | Performed by: PHYSICAL THERAPIST

## 2019-12-10 NOTE — PROGRESS NOTES
PT Evaluation     Today's date: 12/10/2019  Patient name: Israel Haney  : 1970  MRN: [id number]  Referring provider: Brant Martinez DO  Dx:   Encounter Diagnosis     ICD-10-CM    1  Chronic right-sided low back pain with right-sided sciatica M54 41     G89 29    2  Herniated nucleus pulposus, L3-4 right M51 26 Ambulatory referral to Physical Therapy       Start Time: 1505  Stop Time: 1600  Total time in clinic (min): 55 minutes    Assessment  Assessment details: Israel Haney is a 54 y o  male present with:   Chronic right-sided low back pain with right-sided sciatica  (primary encounter diagnosis)  Herniated nucleus pulposus, L3-4 right    Radha Javier is presenting with symptoms consistent with lumbar instability and extension preferred movement patterns  She has the above listed impairments and will benefit from skilled Physical Therapist management to improve deficits to return to prior level of function       Impairments: abnormal muscle firing, abnormal or restricted ROM, activity intolerance, impaired physical strength, lacks appropriate home exercise program and pain with function  Barriers to therapy: 20% co-insurance  Understanding of Dx/Px/POC: good   Prognosis: good    Goals  Impairment Goals  - Decrease pain 0/10  - Improve ROM to 90 degrees lumbar spine flexion  - Increase strength to 5/5 throughout    Functional Goals  - Return to Prior Level of Function  - Increase Functional Status Measure to: 79  - Patient will be independent with HEP  -Patient will be able to return to working without pain    Plan  Patient would benefit from: skilled PT  Planned therapy interventions: joint mobilization, manual therapy, patient education, postural training, activity modification, abdominal trunk stabilization, body mechanics training, flexibility, functional ROM exercises, graded exercise, home exercise program, neuromuscular re-education, strengthening, stretching, therapeutic activities and therapeutic exercise  Frequency: 2x week  Duration in weeks: 8  Plan of Care beginning date: 12/10/2019  Plan of Care expiration date: 2020  Treatment plan discussed with: patient        Subjective Evaluation    History of Present Illness  Mechanism of injury: Morgan reports chronic history of LBP  Denies specific FLAVIA  Notes pain is increased with prolonged standing or sitting, pain into r hip and groin area, denies cauda equina or peripheralization into his leg or bowel/bladder changes  Pain  Current pain ratin  At worst pain ratin  Location: R low back  Quality: sharp  Relieving factors: change in position and medications  Aggravating factors: lifting, walking, standing and stair climbing  Progression: no change    Social Support    Employment status: working ([id number])  Treatments  Current treatment: physical therapy  Patient Goals  Patient goals for therapy: decreased pain, improved balance, increased motion, increased strength and return to work  Patient goal: get back to activities        Objective     Concurrent Complaints  Positive for disturbed sleep  Negative for night pain, bladder dysfunction, bowel dysfunction, saddle (S4) numbness and history of trauma    Palpation     Right   Hypertonic in the iliopsoas  Tenderness of the iliopsoas       Active Range of Motion     Lumbar   Flexion: 60 degrees  with pain  Extension:  with pain Restriction level: moderate  Left lateral flexion:  with pain Restriction level: minimal  Right lateral flexion:  with pain Restriction level: minimal  Left rotation:  WFL  Right rotation:  [facility]    Joint Play     Hypermobile: L1, L2, L3, L4 and L5     Pain: L1, L2, L3, L4 and L5     Strength/Myotome Testing     Lumbar   Left   Heel walk: normal  Toe walk: normal    Right   Heel walk: normal  Toe walk: normal    Left Hip   Planes of Motion   Flexion: 4+  Abduction: 4+    Right Hip   Planes of Motion   Flexion: 5  Abduction: 4+    Left Knee   Flexion: WFL  Extension: WFL    Right Knee   Flexion: WFL  Extension: WFL    Left Ankle/Foot   Dorsiflexion: WFL  Plantar flexion: WFL  Right Ankle/Foot   Dorsiflexion: WFL  Plantar flexion: WFL  Tests     Lumbar   Positive prone instability   Left   Negative passive SLR  Right   Negative passive SLR  Left Pelvic Girdle/Sacrum   Negative: active SLR test      Right Pelvic Girdle/Sacrum   Negative: active SLR test      Left Hip   Negative LAVERNE  Right Hip   Positive LAVERNE               Precautions: chronic pain     Daily Treatment Diary      Manual 12/10/19             TPR psoas nv                         Exercise Diary                     bike nv                   ADIM* 10x5"                   ADIM marches* 3x6                   ADIM heel taps                    Bridges 3x10                                       Low Rows nv                   TG                    Goblet squat nv                   Pallof Press                                                                                                                                                                                                                                     Modalities                                                    *=on HEP

## 2019-12-17 ENCOUNTER — OFFICE VISIT (OUTPATIENT)
Dept: PHYSICAL THERAPY | Facility: REHABILITATION | Age: 49
End: 2019-12-17
Payer: COMMERCIAL

## 2019-12-17 DIAGNOSIS — M51.26 HERNIATED NUCLEUS PULPOSUS, L3-4 RIGHT: ICD-10-CM

## 2019-12-17 DIAGNOSIS — M54.41 CHRONIC RIGHT-SIDED LOW BACK PAIN WITH RIGHT-SIDED SCIATICA: Primary | ICD-10-CM

## 2019-12-17 DIAGNOSIS — G89.29 CHRONIC RIGHT-SIDED LOW BACK PAIN WITH RIGHT-SIDED SCIATICA: Primary | ICD-10-CM

## 2019-12-17 PROCEDURE — 97110 THERAPEUTIC EXERCISES: CPT | Performed by: PHYSICAL THERAPIST

## 2019-12-17 PROCEDURE — 97112 NEUROMUSCULAR REEDUCATION: CPT | Performed by: PHYSICAL THERAPIST

## 2019-12-17 NOTE — PROGRESS NOTES
Daily Note     Today's date: 2019  Patient name: Melvi Edwards  : 1970  MRN: [id number]  Referring provider: Asha Maldonado DO  Dx:   Encounter Diagnosis     ICD-10-CM    1  Chronic right-sided low back pain with right-sided sciatica M54 41     G89 29    2  Herniated nucleus pulposus, L3-4 right M51 26        Start Time: 1455  Stop Time: 1535  Total time in clinic (min): 40 minutes    Subjective: Ronaldo Sanchez reports that his back is feeling slightly improved  Objective: See treatment diary below      Assessment: Tolerated treatment well  Patient demonstrated fatigue post treatment, exhibited good technique with therapeutic exercises and would benefit from continued PT  Continues to have right groin pain  Plan: Continue per plan of care           Precautions: chronic pain     Daily Treatment Diary      Manual 12/10/19  12/17           TPR psoas nv 8'                        Exercise Diary                     bike nv 5' lvl 1                  ADIM* 10x5" 10x5"                  ADIM marches* 3x6 3x6                  ADIM heel taps  nv                  Bridges* 3x10 3x10                                      Low Rows* nv GTB 3x10                  TG  3x15 L 22                  Goblet squat nv pain                  Pallof rotation seated*  10x5"ea x2 GTB                                                                                   Modalities                                                    *=on HEP

## 2019-12-19 ENCOUNTER — APPOINTMENT (OUTPATIENT)
Dept: PHYSICAL THERAPY | Facility: REHABILITATION | Age: 49
End: 2019-12-19
Payer: COMMERCIAL

## 2019-12-23 ENCOUNTER — OFFICE VISIT (OUTPATIENT)
Dept: PHYSICAL THERAPY | Facility: REHABILITATION | Age: 49
End: 2019-12-23
Payer: COMMERCIAL

## 2019-12-23 DIAGNOSIS — G89.29 CHRONIC RIGHT-SIDED LOW BACK PAIN WITH RIGHT-SIDED SCIATICA: Primary | ICD-10-CM

## 2019-12-23 DIAGNOSIS — M51.26 HERNIATED NUCLEUS PULPOSUS, L3-4 RIGHT: ICD-10-CM

## 2019-12-23 DIAGNOSIS — M54.41 CHRONIC RIGHT-SIDED LOW BACK PAIN WITH RIGHT-SIDED SCIATICA: Primary | ICD-10-CM

## 2019-12-23 PROCEDURE — 97140 MANUAL THERAPY 1/> REGIONS: CPT

## 2019-12-23 PROCEDURE — 97110 THERAPEUTIC EXERCISES: CPT

## 2019-12-23 NOTE — PROGRESS NOTES
Daily Note     Today's date: 2019  Patient name: Cornelio Aguilar  : 1970  MRN: [id number]  Referring provider: Susi Serrano DO  Dx:   Encounter Diagnosis     ICD-10-CM    1  Chronic right-sided low back pain with right-sided sciatica M54 41     G89 29    2  Herniated nucleus pulposus, L3-4 right M51 26        Start Time: 1700  Stop Time: 1743  Total time in clinic (min): 43 minutes    Subjective: Patient reports back as "not bad" prior to session today, reporting increased soreness last evening secondary to attending football game  He reports no complaints after previous session  Objective: See treatment diary below      Assessment: Tolerated treatment well  Patient demonstrated fatigue post treatment, exhibited good technique with therapeutic exercises and would benefit from continued PT Trialed heel taps this session, with no pain reported, however cues required for proper abdominal bracing  Most difficulty noted with seated paloff rotations to right side  Cues required for pain free ranges and occasionally proper number of reps  Plan: Continue per plan of care  Progress treatment as tolerated         Precautions: chronic pain     Daily Treatment Diary      Manual 12/10/19  12/17 12/23          TPR psoas nv 8' 8'                       Exercise Diary                     bike nv 5' lvl 1 5' lvl 1                 ADIM* 10x5" 10x5" 10x5''                 ADIM marches* 3x6 3x6 3x8                  ADIM heel taps  nv 3x5                 Bridges* 3x10 3x10 3x12                                     Low Rows* nv GTB 3x10 GTB 3x12                 TG  3x15 L 22 3x15 L22                 Goblet squat nv pain                  Pallof rotation seated*  10x5"ea x2 GTB 10x5'' ea x2 GTB                                                                                  Modalities                                                    *=on HEP

## 2019-12-26 ENCOUNTER — OFFICE VISIT (OUTPATIENT)
Dept: PHYSICAL THERAPY | Facility: REHABILITATION | Age: 49
End: 2019-12-26
Payer: COMMERCIAL

## 2019-12-26 DIAGNOSIS — G89.29 CHRONIC RIGHT-SIDED LOW BACK PAIN WITH RIGHT-SIDED SCIATICA: Primary | ICD-10-CM

## 2019-12-26 DIAGNOSIS — M54.41 CHRONIC RIGHT-SIDED LOW BACK PAIN WITH RIGHT-SIDED SCIATICA: Primary | ICD-10-CM

## 2019-12-26 DIAGNOSIS — M51.26 HERNIATED NUCLEUS PULPOSUS, L3-4 RIGHT: ICD-10-CM

## 2019-12-26 PROCEDURE — 97110 THERAPEUTIC EXERCISES: CPT | Performed by: PHYSICAL THERAPIST

## 2019-12-26 PROCEDURE — 97112 NEUROMUSCULAR REEDUCATION: CPT | Performed by: PHYSICAL THERAPIST

## 2019-12-26 NOTE — PROGRESS NOTES
Daily Note     Today's date: 2019  Patient name: Cony Garza  : 1970  MRN: [id number]  Referring provider: Saira Mast DO  Dx:   Encounter Diagnosis     ICD-10-CM    1  Chronic right-sided low back pain with right-sided sciatica M54 41     G89 29    2  Herniated nucleus pulposus, L3-4 right M51 26        Start Time: 1400  Stop Time: 1435  Total time in clinic (min): 35 minutes    Subjective: The patient reports that his back is "normal" sore today and he feels a little more pain over his right hip  He notes that he sat a lot yesterday for the Holiday but did not do anything out of the ordinary  Objective: See treatment diary below      Assessment: Tolerated treatment well  Patient required cues to help isolate the TA as he was exhibiting Valsalva and rectus abominis engagement  Educated about breathing and controlling IAP to reduce pressure on lumbar spine  Also about submaximal engagement of the transverse abdominals  No pain with exercises today, but he required cueing form for breathing and exercises  He will return for treatment once per week for the next 2 weeks  Plan: Continue per plan of care        Precautions: chronic pain; 2 units max billing     Daily Treatment Diary      Manual 12/10/19  12/17 12/23 12/26         TPR psoas nv 8' 8'                       Exercise Diary                     bike nv 5' lvl 1 5' lvl 1  5' lvl 1               ADIM* 10x5" 10x5" 10x5''  10"x5               ADIM marches* 3x6 3x6 3x8   3x8               ADIM heel taps  nv 3x5  3x5               Bridges* 3x10 3x10 3x12  3x12                                   Low Rows* nv GTB 3x10 GTB 3x12  GTB  3x12               TG  3x15 L 22 3x15 L22  3x15  L22               Goblet squat nv pain                  Pallof rotation seated*  10x5"ea x2 GTB 10x5'' ea x2 GTB  10x5"               Mark walks     10# 3 laps               Suitcase walks     10# 3 laps each                                        Modalities *=on HEP

## 2019-12-30 ENCOUNTER — OFFICE VISIT (OUTPATIENT)
Dept: PHYSICAL THERAPY | Facility: REHABILITATION | Age: 49
End: 2019-12-30
Payer: COMMERCIAL

## 2019-12-30 DIAGNOSIS — M51.26 HERNIATED NUCLEUS PULPOSUS, L3-4 RIGHT: ICD-10-CM

## 2019-12-30 DIAGNOSIS — M54.41 CHRONIC RIGHT-SIDED LOW BACK PAIN WITH RIGHT-SIDED SCIATICA: Primary | ICD-10-CM

## 2019-12-30 DIAGNOSIS — G89.29 CHRONIC RIGHT-SIDED LOW BACK PAIN WITH RIGHT-SIDED SCIATICA: Primary | ICD-10-CM

## 2019-12-30 PROCEDURE — 97110 THERAPEUTIC EXERCISES: CPT

## 2019-12-30 PROCEDURE — 97140 MANUAL THERAPY 1/> REGIONS: CPT

## 2019-12-30 NOTE — PROGRESS NOTES
Daily Note     Today's date: 2019  Patient name: Sivan Christine  : 1970  MRN: [id number]  Referring provider: Cassidy Humphreys DO  Dx:   Encounter Diagnosis     ICD-10-CM    1  Chronic right-sided low back pain with right-sided sciatica M54 41     G89 29    2  Herniated nucleus pulposus, L3-4 right M51 26        Start Time: 1500  Stop Time: 1555  Total time in clinic (min): 55 minutes    Subjective: Patient reports increased discomfort prior to session today secondary to moving/lifting furniture on Saturday  Patient reports discomfort occurring "half way through I felt it, but then it was okay  But Saturday night and yesterday could barely move "       Objective: See treatment diary below      Assessment: Tolerated treatment fair  Patient demonstrated fatigue post treatment and would benefit from continued PT Consistent cues required for proper abdominal bracing throughout session, little carry over  Did not complete certain TE secondary to patients discomfort level  Patient reporting relief after manuals today  Plan: Continue per plan of care  Progress treatment as tolerated         Precautions: chronic pain; 2 units max billing     Daily Treatment Diary      Manual 12/10/19  12/17 12/23 12/26 12/30        TPR psoas/erector spinae nv 8' 8'  10' total                     Exercise Diary                     bike nv 5' lvl 1 5' lvl 1   5' lvl 1 5' lvl 1             ADIM* 10x5" 10x5" 10x5''   10"x5 10''x5             ADIM marches* 3x6 3x6 3x8    3x8  3x8              ADIM heel taps  nv 3x5   3x5  nv             Bridges* 3x10 3x10 3x12   3x12  3x10 shallow                                 Low Rows* nv GTB 3x10 GTB 3x12   GTB  3x12  GTB 3x12             TG  3x15 L 22 3x15 L22   3x15  L22  3x10 L19             Goblet squat nv pain                  Pallof rotation seated*  10x5"ea x2 GTB 10x5'' ea x2 GTB   10x5"  nv             Mark walks     10# 3 laps  nv             Suitcase walks     10# 3 laps each  nv              Paloff press seated           GTB 2x10 ea              Modalities                         MHP          10'                  *=on HEP

## 2020-01-06 ENCOUNTER — OFFICE VISIT (OUTPATIENT)
Dept: PHYSICAL THERAPY | Facility: REHABILITATION | Age: 50
End: 2020-01-06
Payer: COMMERCIAL

## 2020-01-06 DIAGNOSIS — M51.26 HERNIATED NUCLEUS PULPOSUS, L3-4 RIGHT: ICD-10-CM

## 2020-01-06 DIAGNOSIS — M54.41 CHRONIC RIGHT-SIDED LOW BACK PAIN WITH RIGHT-SIDED SCIATICA: Primary | ICD-10-CM

## 2020-01-06 DIAGNOSIS — G89.29 CHRONIC RIGHT-SIDED LOW BACK PAIN WITH RIGHT-SIDED SCIATICA: Primary | ICD-10-CM

## 2020-01-06 PROCEDURE — 97110 THERAPEUTIC EXERCISES: CPT

## 2020-01-06 PROCEDURE — 97112 NEUROMUSCULAR REEDUCATION: CPT

## 2020-01-06 NOTE — PROGRESS NOTES
Daily Note     Today's date: 2020  Patient name: Kandace Frank  : 1970  MRN: [id number]  Referring provider: Rajendra Pena DO  Dx:   Encounter Diagnosis     ICD-10-CM    1  Chronic right-sided low back pain with right-sided sciatica M54 41     G89 29    2  Herniated nucleus pulposus, L3-4 right M51 26        Start Time: 1500  Stop Time: 1545  Total time in clinic (min): 45 minutes    Subjective: Patient reports some stiffness prior to session today  He reports back has been feeling "much better" since previous session  Objective: See treatment diary below      Assessment: Tolerated treatment well  Patient demonstrated fatigue post treatment and would benefit from continued PT Cues continued to be required for proper abdominal bracing as well as for proper rest breaks and form with most TE  Patient denies any pain with any TE performed, only "pulling "       Plan: Continue per plan of care  Progress treatment as tolerated         Precautions: chronic pain; 2 units max billing     Daily Treatment Diary      Manual 12/10/19  12/17 12/23 12/26 12/30 1/6       TPR psoas/erector spinae nv 8' 8'  10' total                     Exercise Diary                     bike nv 5' lvl 1 5' lvl 1   5' lvl 1 5' lvl 1  5' lvl 1           ADIM* 10x5" 10x5" 10x5''   10"x5 10''x5 10x5''           ADIM marches* 3x6 3x6 3x8    3x8   3x8   3x10            ADIM heel taps  nv 3x5   3x5   nv  3x6           Bridges* 3x10 3x10 3x12   3x12   3x10 shallow  3x12                               Low Rows* nv GTB 3x10 GTB 3x12   GTB  3x12   GTB 3x12  GTB 3x15           TG  3x15 L 22 3x15 L22   3x15  L22   3x10 L19  3x12 L20           Goblet squat nv pain                  Pallof rotation seated*  10x5"ea x2 GTB 10x5'' ea x2 GTB   10x5"   nv  OTB 2x10 ea           Mark walks     10# 3 laps   nv  20# 3 laps           Suitcase walks     10# 3 laps each   nv  20# 3 laps ea            Paloff press seated           GTB 2x10 ea  GTB 2x10 ea            Modalities                          P          10'                   *=on HEP

## 2020-01-09 ENCOUNTER — OFFICE VISIT (OUTPATIENT)
Dept: SLEEP CENTER | Facility: CLINIC | Age: 50
End: 2020-01-09
Payer: COMMERCIAL

## 2020-01-09 VITALS
WEIGHT: 179.13 LBS | HEIGHT: 67 IN | BODY MASS INDEX: 28.11 KG/M2 | SYSTOLIC BLOOD PRESSURE: 110 MMHG | DIASTOLIC BLOOD PRESSURE: 90 MMHG

## 2020-01-09 DIAGNOSIS — G47.00 INSOMNIA, UNSPECIFIED TYPE: ICD-10-CM

## 2020-01-09 DIAGNOSIS — R06.81 WITNESSED EPISODE OF APNEA: ICD-10-CM

## 2020-01-09 DIAGNOSIS — R06.83 SNORING: Primary | ICD-10-CM

## 2020-01-09 DIAGNOSIS — G47.00 FREQUENT NOCTURNAL AWAKENING: ICD-10-CM

## 2020-01-09 DIAGNOSIS — G47.8 NON-RESTORATIVE SLEEP: ICD-10-CM

## 2020-01-09 PROCEDURE — 99244 OFF/OP CNSLTJ NEW/EST MOD 40: CPT | Performed by: PSYCHIATRY & NEUROLOGY

## 2020-01-09 NOTE — PATIENT INSTRUCTIONS
Recommendations:  1) HST with in lab PSG if non-diagnostic  2) Driving safety was reviewed with patient  If the patient feels too sleepy to drive he knows not to drive  If he becomes sleepy while driving he will pull over and nap  3) Follow-up after initiation of treatment if needed  4) Call with any questions or concerns  Insomnia:  1) Get up at the same time seven days out of the week  2) Only go to bed when feeling sleepy  3) Wind down in the evening without electronics  4) Stimulus Control: If lying in bed for 15-20 minutes (estimated because the clock is turned away so you cannot see it) and you are not asleep get up and do something relaxing in a different room (reading a magazine article, solitaire with a deck of cards)  Do this in the middle of the night as well if awake  Avoid doing work or getting on the computer  5) Bedroom for sleep only  No watching TV or using electronics (computer, phone, tablet etc )  in bed  6) Turn clock away so you cannot see it in bed  7) Exercise regularly but try to avoid exercise within 4 hours of bedtime  Morning exercise is best     8) Avoid caffeine in the afternoon  Considering tapering down on caffeine by decreasing by one beverage with caffeine every 3 days until off  9) Avoid smoking near bedtime    10) Avoid alcohol before bed  If you consume one alcoholic beverage allow 3 hours between that drink and bedtime  If you consume two alcoholic beverages allow 5 hours  Between those drinks and bedtime  Alcohol may lead to waking at night  11) Avoid napping except for driving safety  If you feel to sleepy to drive do not drive  If you get sleepy while driving pull over and nap  You may resume driving once you feel alert  12) Read "No More Sleepless Nights" by Real Stake PhD     13) There are some on-line resources that do require a fee that can be of help    Two credible websites are as follows:  http://Goalbook/cbt-online-insomnia-treatment html  [employer] si  An ryan used by the [place] is as follows:  CBT-I   Go! To Sleep by the [facility]

## 2020-01-09 NOTE — PROGRESS NOTES
Sleep Medicine Consultation Note    HPI:  Mr Antonina Toney is a 54 y o  male seen at the request of Manisha Kaur DO for advice regarding suspected sleep disordered breathing  The patient stated that he has trouble sleeping at night that has been long standing  This has been stable over time  He endorsed it as being trouble staying asleep with frequent awakenings and then having trouble getting back to sleep  He is unsure what wakes him and then he start thinking about other things about his job  He denied waking up gasping for air, but at times will wake up "disoriented and feeling like I cannot see straight " He has tried melatonin, which improves his sleep in the beginning, but then he will still wake up  Nothing he knows makes it worse  Please see below for continuation of the HPI:      Sleep Disordered Breathing:  -Snoring: yes   -Severity: not that bad   -Frequency: not every night   -Duration: long time   -Over time: same   -Modifying factors: unsure  -Observed Apneas: yes  -Mouth Breathing at night: no  -Dry Mouth in morning: yes   -Nocturnal Gasping: no  -Nasal Obstruction: no  -Weight: stable    Sleep Pattern:  -Location: bedroom  -Bed/Recliner/Wedge: bed  -Bed Partner: yes  -HOB: flat  -# of pillows under head: 2  -Position: sides only   Cannot breath when he lays on his back  -Bedtime: 10pm  -Lights out: same time  -Environmental: TV for an hour  -Latency: 1 hour  -Awakenings: three   -Reason: unknown and void   -Duration: 1 hour   -Wake time: 7am   -Alarm: no  -Rise time: same time  -Days off: same  -Shift Work: M-F days  -Patient's estimate of total sleep time: 4-5 hours     Daytime Symptoms:  -Upon Awakening: not too tired  -Daytime fatigue/sleepiness: dragging from lunch time on    -Naps: no  -Involuntary Dozing: denied  -Cognitive Symptoms: denied  -Driving: Difficulty with sleepiness and driving:  denied   -- Close calls related to sleepiness: denied   -- Accidents related to sleepiness: denied      Questionnaires:   Sitting and reading: Would never doze  Watching TV: Would never doze  Sitting, inactive in a public place (e g  a theatre or a meeting): Would never doze  As a passenger in a car for an hour without a break: Would never doze  Lying down to rest in the afternoon when circumstances permit: Would never doze  Sitting and talking to someone: Would never doze  Sitting quietly after a lunch without alcohol: Would never doze  In a car, while stopped for a few minutes in traffic: Would never doze  Total score: 0      Sleep Review of Symptoms:  -Parasomnias:  --Sleep Walking: denied  --Dream Enactment: denied  --Bruxism: yes  -Motor:  --RLS: yes, this can keep him awake  Denied bleeding  --PLMS: yes  -Narcolepsy:  --Hallucinations: denied  --Paralysis: denied  --Cataplexy: denied    Childhood Sleep History: denied    Prior Sleep Studies/Evaluations:  denied    Family History:  Family history of sleep disorders: mom has insomnia    Patient Active Problem List   Diagnosis    Anxiety    Carter esophagus    Esophageal reflux    Essential hypertriglyceridemia    Fatigue    Orthopnea    Impaired fasting glucose    Right groin pain     Past Medical History:   Diagnosis Date    History of illicit drug use     cocaine noted in social history       --> Denies any other cardiopulmonary disease  --> Seizure hx: denies  --> Head injury with LOC: denies  --> Supplemental Oxygen Use: denies    Labs   Results for Lesa Munoz (MRN [id number]) as of 1/9/2020 15:15   Ref   Range 11/13/2018 08:56   Sodium Latest Ref Range: 135 - 146 mmol/L 141   Potassium Latest Ref Range: 3 5 - 5 3 mmol/L 3 8   Chloride Latest Ref Range: 98 - 110 mmol/L 103   CO2 Latest Ref Range: 20 - 32 mmol/L 30   BUN Latest Ref Range: 7 - 25 mg/dL 11   Creatinine Latest Ref Range: 0 60 - 1 35 mg/dL 1 02   SL AMB BUN/CREATININE RATIO Latest Ref Range: 6 - 22 (calc) NOT APPLICABLE   Glucose, Random Latest Ref Range: 65 - 99 mg/dL 121 (H)   AST Latest Ref Range: 10 - 40 U/L 21   ALT Latest Ref Range: 9 - 46 U/L 40   Alkaline Phosphatase Latest Ref Range: 40 - 115 U/L 53   Total Protein Latest Ref Range: 6 1 - 8 1 g/dL 6 5   Albumin Latest Ref Range: 3 6 - 5 1 g/dL 4 4   TOTAL BILIRUBIN Latest Ref Range: 0 2 - 1 2 mg/dL 1 4 (H)   eGFR African American Latest Ref Range: > OR = 60 mL/min/1 73m2 102   eGFR Non  Latest Ref Range: > OR = 60 mL/min/1 73m2 88   Albumin/Globulin Ratio Latest Ref Range: 1 0 - 2 5 (calc) 2 1   Globulin Latest Ref Range: 1 9 - 3 7 g/dL (calc) 2 1   Hemoglobin A1C Latest Ref Range: <5 7 % of total Hgb 5 7 (H)   SL AMB CALCIUM Latest Ref Range: 8 6 - 10 3 mg/dL 9 5         Past Surgical History:   Procedure Laterality Date    CHOLECYSTECTOMY      CHOLECYSTECTOMY      COLONOSCOPY W/ BIOPSIES  11/2011    polyps       --> ENT procedures: denies    Current Outpatient Medications   Medication Sig Dispense Refill    cyclobenzaprine (FLEXERIL) 5 mg tablet Take 1 tablet (5 mg total) by mouth 3 (three) times a day as needed for muscle spasms 15 tablet 0    omeprazole (PriLOSEC) 40 MG capsule Take 1 capsule (40 mg total) by mouth daily 90 capsule 1     No current facility-administered medications for this visit            Social History:  -Employment: business owner  -Smoking: former smoker x 1 year  -Caffeine: 2 cups in the morning  -Alcohol: 2 days a week he has beer  -THC: denied  -OTC/Supplements/herbals: denied  -Illicits: remote cocaine use 20 years ago  -Family: lives with family    ROS:  Genitourinary none   Cardiology none   Gastrointestinal none   Neurology none   Constitutional none   Integumentary none   Psychiatry none   Musculoskeletal none   Pulmonary none   ENT none   Endocrine none   Hematological none       MSE:  -Alert and appropriate: alert, calm, cooperative  -Oriented to person, place and time:  name, age, location, day/date/mon/yr  -Behavior: good, sustained eye contact  -Speech: Unremarkable rate/rhythm/volume  -Mood: "fine"  -Affect: full range  -Thought Processes: linear, logical, goal directed  PE:  Body mass index is 27 85 kg/m²  Vitals:    01/09/20 1500   BP: 110/90   Weight: 81 3 kg (179 lb 2 oz)   Height: 5' 7 25" (1 708 m)       -General:  In NAD    -Eyes: Conjunctival injection: none     -EOM:  PERRLA, EOMI   -Eyelid hooding: no    -ENT: MP: 4/4   -Facial deformity: no retrognathia   -Hard palate: moderate arch   -Soft palate: crowding with an engorged uvula and redundant tissue   -Gums and teeth:  dentition   -Tongue:  Scalloping   -Nares:  Patent    -Neck/Lymphatics: Lymphadenopathy:  none appreciated   -Masses:  none appreciated   -Circumference: Neck Circumference: 17 "    -Cardiac: Auscultation:  RRR   - LE edema over shins: none appreciated    -Pulm: -Respirations: unlaboured         -Auscultation:  CTA bilaterally, posterior fields    -Neuro: No resting tremor     -Musculoskeletal: Gait and stance: normal turning and ambulation; unremarkable  Assessment:  Mr James Robledo is a 54 y o  male who is seen to evaluate for possible obstructive sleep apnea  Given the patient's symptoms of observed apneas, snoring, morning tiredness, non-restorative sleep, and RLS in the context of a narrow airway,  a diagnosis of obstructive sleep apnea is very likely, even in the presence of a normal BMI  The pathophysiology of, the reasons to treat and treatment options for obstructive sleep apnea were all reviewed with the patient today  Discussed the testing options and reviewed the benefits and downsides of both, patient opted for a Home Sleep Apnea Test  He is amenable to treatment with PAP therapy  Discussed keeping nasal passages clear, abstaining from alcohol, and other sedating drugs at night- which will worsen symptoms of TOBIN  --History provided by: patient   --Records reviewed: in chart      Recommendations:  1) HST with in lab PSG if non-diagnostic    2) Driving safety was reviewed with patient  If the patient feels too sleepy to drive he knows not to drive  If he becomes sleepy while driving he will pull over and nap  3) Follow-up after initiation of treatment if needed  4) Call with any questions or concerns  Insomnia:  1) Get up at the same time seven days out of the week  2) Only go to bed when feeling sleepy  3) Wind down in the evening without electronics  4) Stimulus Control: If lying in bed for 15-20 minutes (estimated because the clock is turned away so you cannot see it) and you are not asleep get up and do something relaxing in a different room (reading a magazine article, solitaire with a deck of cards)  Do this in the middle of the night as well if awake  Avoid doing work or getting on the computer  5) Bedroom for sleep only  No watching TV or using electronics (computer, phone, tablet etc )  in bed  6) Turn clock away so you cannot see it in bed  7) Exercise regularly but try to avoid exercise within 4 hours of bedtime  Morning exercise is best     8) Avoid caffeine in the afternoon  Considering tapering down on caffeine by decreasing by one beverage with caffeine every 3 days until off  9) Avoid smoking near bedtime    10) Avoid alcohol before bed  If you consume one alcoholic beverage allow 3 hours between that drink and bedtime  If you consume two alcoholic beverages allow 5 hours  Between those drinks and bedtime  Alcohol may lead to waking at night  11) Avoid napping except for driving safety  If you feel to sleepy to drive do not drive  If you get sleepy while driving pull over and nap  You may resume driving once you feel alert  12) Read "No More Sleepless Nights" by Pa Sutherland PhD     13) There are some on-line resources that do require a fee that can be of help    Two credible websites are as follows:  http://[employer]end/cbt-online-insomnia-treatment html  [employer] si  An ryan used by the [place] is as follows:  CBT-I   Go! To Sleep by the [facility]  All questions answered for the patient, who indicated understanding and agreed with the plan       Lary Munguia MD  Psychiatry/ Sleep medicine

## 2020-01-09 NOTE — LETTER
January 9, 2020     [id number]    Patient: Cornelio Aguilar   YOB: 1970   Date of Visit: 1/9/2020       Dear Dr Akiko Fernández:    Thank you for referring Zahra De Luna to me for evaluation  Below are my notes for this consultation  If you have questions, please do not hesitate to call me  I look forward to following your patient along with you  Sincerely,        Killian Gerard MD        CC: No Recipients  Killian Gerard MD  1/9/2020  3:37 PM  Sign at close encounter  Sleep Medicine Consultation Note    HPI:  Mr Cornelio Aguilar is a 54 y o  male seen at the request of Manisha Kaur,  for advice regarding suspected sleep disordered breathing  The patient stated that he has trouble sleeping at night that has been long standing  This has been stable over time  He endorsed it as being trouble staying asleep with frequent awakenings and then having trouble getting back to sleep  He is unsure what wakes him and then he start thinking about other things about his job  He denied waking up gasping for air, but at times will wake up "disoriented and feeling like I cannot see straight " He has tried melatonin, which improves his sleep in the beginning, but then he will still wake up  Nothing he knows makes it worse  Please see below for continuation of the HPI:      Sleep Disordered Breathing:  -Snoring: yes   -Severity: not that bad   -Frequency: not every night   -Duration: long time   -Over time: same   -Modifying factors: unsure  -Observed Apneas: yes  -Mouth Breathing at night: no  -Dry Mouth in morning: yes   -Nocturnal Gasping: no  -Nasal Obstruction: no  -Weight: stable    Sleep Pattern:  -Location: bedroom  -Bed/Recliner/Wedge: bed  -Bed Partner: yes  -HOB: flat  -# of pillows under head: 2  -Position: sides only   Cannot breath when he lays on his back  -Bedtime: 10pm  -Lights out: same time  -Environmental: TV for an hour  -Latency: 1 hour  -Awakenings: three   -Reason: unknown and void   -Duration: 1 hour   -Wake time: 7am   -Alarm: no  -Rise time: same time  -Days off: same  -Shift Work: M-F days  -Patient's estimate of total sleep time: 4-5 hours     Daytime Symptoms:  -Upon Awakening: not too tired  -Daytime fatigue/sleepiness: dragging from lunch time on    -Naps: no  -Involuntary Dozing: denied  -Cognitive Symptoms: denied  -Driving: Difficulty with sleepiness and driving:  denied   -- Close calls related to sleepiness: denied   -- Accidents related to sleepiness: denied      Questionnaires:   Sitting and reading: Would never doze  Watching TV: Would never doze  Sitting, inactive in a public place (e g  a theatre or a meeting): Would never doze  As a passenger in a car for an hour without a break: Would never doze  Lying down to rest in the afternoon when circumstances permit: Would never doze  Sitting and talking to someone: Would never doze  Sitting quietly after a lunch without alcohol: Would never doze  In a car, while stopped for a few minutes in traffic: Would never doze  Total score: 0      Sleep Review of Symptoms:  -Parasomnias:  --Sleep Walking: denied  --Dream Enactment: denied  --Bruxism: yes  -Motor:  --RLS: yes, this can keep him awake   Denied bleeding  --PLMS: yes  -Narcolepsy:  --Hallucinations: denied  --Paralysis: denied  --Cataplexy: denied    Childhood Sleep History: denied    Prior Sleep Studies/Evaluations:  denied    Family History:  Family history of sleep disorders: mom has insomnia    Patient Active Problem List   Diagnosis    Anxiety    Carter esophagus    Esophageal reflux    Essential hypertriglyceridemia    Fatigue    Orthopnea    Impaired fasting glucose    Right groin pain     Past Medical History:   Diagnosis Date    History of illicit drug use     cocaine noted in social history       --> Denies any other cardiopulmonary disease  --> Seizure hx: denies  --> Head injury with LOC: denies  --> Supplemental Oxygen Use: denies    Labs   Results for Dariana Torres (MRN [id number]) as of 1/9/2020 15:15   Ref  Range 11/13/2018 08:56   Sodium Latest Ref Range: 135 - 146 mmol/L 141   Potassium Latest Ref Range: 3 5 - 5 3 mmol/L 3 8   Chloride Latest Ref Range: 98 - 110 mmol/L 103   CO2 Latest Ref Range: 20 - 32 mmol/L 30   BUN Latest Ref Range: 7 - 25 mg/dL 11   Creatinine Latest Ref Range: 0 60 - 1 35 mg/dL 1 02   SL AMB BUN/CREATININE RATIO Latest Ref Range: 6 - 22 (calc) NOT APPLICABLE   Glucose, Random Latest Ref Range: 65 - 99 mg/dL 121 (H)   AST Latest Ref Range: 10 - 40 U/L 21   ALT Latest Ref Range: 9 - 46 U/L 40   Alkaline Phosphatase Latest Ref Range: 40 - 115 U/L 53   Total Protein Latest Ref Range: 6 1 - 8 1 g/dL 6 5   Albumin Latest Ref Range: 3 6 - 5 1 g/dL 4 4   TOTAL BILIRUBIN Latest Ref Range: 0 2 - 1 2 mg/dL 1 4 (H)   eGFR African American Latest Ref Range: > OR = 60 mL/min/1 73m2 102   eGFR Non  Latest Ref Range: > OR = 60 mL/min/1 73m2 88   Albumin/Globulin Ratio Latest Ref Range: 1 0 - 2 5 (calc) 2 1   Globulin Latest Ref Range: 1 9 - 3 7 g/dL (calc) 2 1   Hemoglobin A1C Latest Ref Range: <5 7 % of total Hgb 5 7 (H)   SL AMB CALCIUM Latest Ref Range: 8 6 - 10 3 mg/dL 9 5         Past Surgical History:   Procedure Laterality Date    CHOLECYSTECTOMY      CHOLECYSTECTOMY      COLONOSCOPY W/ BIOPSIES  11/2011    polyps       --> ENT procedures: denies    Current Outpatient Medications   Medication Sig Dispense Refill    cyclobenzaprine (FLEXERIL) 5 mg tablet Take 1 tablet (5 mg total) by mouth 3 (three) times a day as needed for muscle spasms 15 tablet 0    omeprazole (PriLOSEC) 40 MG capsule Take 1 capsule (40 mg total) by mouth daily 90 capsule 1     No current facility-administered medications for this visit            Social History:  -Employment: business owner  -Smoking: former smoker x 1 year  -Caffeine: 2 cups in the morning  -Alcohol: 2 days a week he has beer  -THC: denied  -OTC/Supplements/herbals: denied  -Illicits: remote cocaine use 20 years ago  -Family: lives with family    ROS:  Genitourinary none   Cardiology none   Gastrointestinal none   Neurology none   Constitutional none   Integumentary none   Psychiatry none   Musculoskeletal none   Pulmonary none   ENT none   Endocrine none   Hematological none       MSE:  -Alert and appropriate: alert, calm, cooperative  -Oriented to person, place and time:  name, age, location, day/date/mon/yr  -Behavior: good, sustained eye contact  -Speech: Unremarkable rate/rhythm/volume  -Mood: "fine"  -Affect: full range  -Thought Processes: linear, logical, goal directed  PE:  Body mass index is 27 85 kg/m²  Vitals:    01/09/20 1500   BP: 110/90   Weight: 81 3 kg (179 lb 2 oz)   Height: 5' 7 25" (1 708 m)       -General:  In NAD    -Eyes: Conjunctival injection: none     -EOM:  PERRLA, EOMI   -Eyelid hooding: no    -ENT: MP: 4/4   -Facial deformity: no retrognathia   -Hard palate: moderate arch   -Soft palate: crowding with an engorged uvula and redundant tissue   -Gums and teeth:  dentition   -Tongue:  Scalloping   -Nares:  Patent    -Neck/Lymphatics: Lymphadenopathy:  none appreciated   -Masses:  none appreciated   -Circumference: Neck Circumference: 17 "    -Cardiac: Auscultation:  RRR   - LE edema over shins: none appreciated    -Pulm: -Respirations: unlaboured         -Auscultation:  CTA bilaterally, posterior fields    -Neuro: No resting tremor     -Musculoskeletal: Gait and stance: normal turning and ambulation; unremarkable  Assessment:  Mr Selena Owusu is a 54 y o  male who is seen to evaluate for possible obstructive sleep apnea  Given the patient's symptoms of observed apneas, snoring, morning tiredness, non-restorative sleep, and RLS in the context of a narrow airway,  a diagnosis of obstructive sleep apnea is very likely, even in the presence of a normal BMI    The pathophysiology of, the reasons to treat and treatment options for obstructive sleep apnea were all reviewed with the patient today  Discussed the testing options and reviewed the benefits and downsides of both, patient opted for a Home Sleep Apnea Test  He is amenable to treatment with PAP therapy  Discussed keeping nasal passages clear, abstaining from alcohol, and other sedating drugs at night- which will worsen symptoms of TOBIN  --History provided by: patient   --Records reviewed: in chart      Recommendations:  1) HST with in lab PSG if non-diagnostic  2) Driving safety was reviewed with patient  If the patient feels too sleepy to drive he knows not to drive  If he becomes sleepy while driving he will pull over and nap  3) Follow-up after initiation of treatment if needed  4) Call with any questions or concerns  Insomnia:  1) Get up at the same time seven days out of the week  2) Only go to bed when feeling sleepy  3) Wind down in the evening without electronics  4) Stimulus Control: If lying in bed for 15-20 minutes (estimated because the clock is turned away so you cannot see it) and you are not asleep get up and do something relaxing in a different room (reading a magazine article, solitaire with a deck of cards)  Do this in the middle of the night as well if awake  Avoid doing work or getting on the computer  5) Bedroom for sleep only  No watching TV or using electronics (computer, phone, tablet etc )  in bed  6) Turn clock away so you cannot see it in bed  7) Exercise regularly but try to avoid exercise within 4 hours of bedtime  Morning exercise is best     8) Avoid caffeine in the afternoon  Considering tapering down on caffeine by decreasing by one beverage with caffeine every 3 days until off  9) Avoid smoking near bedtime    10) Avoid alcohol before bed  If you consume one alcoholic beverage allow 3 hours between that drink and bedtime    If you consume two alcoholic beverages allow 5 hours Between those drinks and bedtime  Alcohol may lead to waking at night  11) Avoid napping except for driving safety  If you feel to sleepy to drive do not drive  If you get sleepy while driving pull over and nap  You may resume driving once you feel alert  12) Read "No More Sleepless Nights" by Virginia Pérez PhD     13) There are some on-line resources that do require a fee that can be of help  Two credible websites are as follows:  http://[employer]/cbt-online-insomnia-treatment html  [employer] si  An ryan used by the [place] is as follows:  CBT-I   Go! To Sleep by the [facility]  All questions answered for the patient, who indicated understanding and agreed with the plan       Lottie Lowery MD  Psychiatry/ Sleep medicine

## 2020-01-13 ENCOUNTER — OFFICE VISIT (OUTPATIENT)
Dept: PHYSICAL THERAPY | Facility: REHABILITATION | Age: 50
End: 2020-01-13
Payer: COMMERCIAL

## 2020-01-13 DIAGNOSIS — G89.29 CHRONIC RIGHT-SIDED LOW BACK PAIN WITH RIGHT-SIDED SCIATICA: Primary | ICD-10-CM

## 2020-01-13 DIAGNOSIS — M51.26 HERNIATED NUCLEUS PULPOSUS, L3-4 RIGHT: ICD-10-CM

## 2020-01-13 DIAGNOSIS — M54.41 CHRONIC RIGHT-SIDED LOW BACK PAIN WITH RIGHT-SIDED SCIATICA: Primary | ICD-10-CM

## 2020-01-13 PROCEDURE — 97110 THERAPEUTIC EXERCISES: CPT | Performed by: PHYSICAL THERAPIST

## 2020-01-13 PROCEDURE — 97112 NEUROMUSCULAR REEDUCATION: CPT | Performed by: PHYSICAL THERAPIST

## 2020-01-13 NOTE — PROGRESS NOTES
Daily Note     Today's date: 2020  Patient name: Dolores Reilly  : 1970  MRN: [id number]  Referring provider: Sridevi Hale DO  Dx:   Encounter Diagnosis     ICD-10-CM    1  Chronic right-sided low back pain with right-sided sciatica M54 41     G89 29    2  Herniated nucleus pulposus, L3-4 right M51 26                   Subjective: Jon Lew reports that his back has been feeling pretty good, notes slight soreness within low back after riding his motorcycle for a few hours this past weekend  Objective: See treatment diary below      Assessment: Tolerated treatment well  Patient demonstrated fatigue post treatment, exhibited good technique with therapeutic exercises and would benefit from continued PT      Plan: Continue per plan of care           Precautions: chronic pain; 2 units max billing     Daily Treatment Diary      Manual 12/10/19  12/17 12/23 12/26 12/30 1/6 1/13      TPR psoas/erector spinae nv 8' 8'  10' total  nv                   Exercise Diary                     bike nv 5' lvl 1 5' lvl 1   5' lvl 1 5' lvl 1   5' lvl 1  5' lvl 1         ADIM* 10x5" 10x5" 10x5''   10"x5 10''x5 10x5''  dc         ADIM marches* 3x6 3x6 3x8    3x8   3x8    3x10   dc         ADIM heel taps  nv 3x5   3x5   nv   3x6  3x8         Bridges* 3x10 3x10 3x12   3x12   3x10 shallow   3x12  3x15                             Low Rows* nv GTB 3x10 GTB 3x12   GTB  3x12   GTB 3x12   GTB 3x15  BTB nv         TG  3x15 L 22 3x15 L22   3x15  L22   3x10 L19   3x12 L20  3x12 L 22         Goblet squat nv pain                  Pallof rotation seated*  10x5"ea x2 GTB 10x5'' ea x2 GTB   10x5"   nv   OTB 2x10 ea  dc         Mark walks     10# 3 laps   nv   20# 3 laps  30# 3 laps ea         Suitcase walks     10# 3 laps each   nv   20# 3 laps ea  30# 3 laps ea          Paloff press seated           GTB 2x10 ea   GTB 2x10 ea  GTB standing 2x10x5" ea          Modalities                          MHP          10' *=on HEP

## 2020-01-20 ENCOUNTER — APPOINTMENT (OUTPATIENT)
Dept: PHYSICAL THERAPY | Facility: REHABILITATION | Age: 50
End: 2020-01-20
Payer: COMMERCIAL

## 2020-01-21 ENCOUNTER — OFFICE VISIT (OUTPATIENT)
Dept: PHYSICAL THERAPY | Facility: REHABILITATION | Age: 50
End: 2020-01-21
Payer: COMMERCIAL

## 2020-01-21 DIAGNOSIS — M51.26 HERNIATED NUCLEUS PULPOSUS, L3-4 RIGHT: ICD-10-CM

## 2020-01-21 DIAGNOSIS — M54.41 CHRONIC RIGHT-SIDED LOW BACK PAIN WITH RIGHT-SIDED SCIATICA: Primary | ICD-10-CM

## 2020-01-21 DIAGNOSIS — G89.29 CHRONIC RIGHT-SIDED LOW BACK PAIN WITH RIGHT-SIDED SCIATICA: Primary | ICD-10-CM

## 2020-01-21 PROCEDURE — 97110 THERAPEUTIC EXERCISES: CPT | Performed by: PHYSICAL THERAPIST

## 2020-01-21 PROCEDURE — 97530 THERAPEUTIC ACTIVITIES: CPT | Performed by: PHYSICAL THERAPIST

## 2020-01-21 NOTE — PROGRESS NOTES
Daily Note     Today's date: 2020  Patient name: Michael France  : 1970  MRN: [id number]  Referring provider: Matt Machado DO  Dx:   Encounter Diagnosis     ICD-10-CM    1  Chronic right-sided low back pain with right-sided sciatica M54 41     G89 29    2  Herniated nucleus pulposus, L3-4 right M51 26                   Subjective: Zoraida Hannon reports that his back has been feeling much better as of late  Objective: See treatment diary below      Assessment: Tolerated treatment well  Patient demonstrated fatigue post treatment, exhibited good technique with therapeutic exercises and would benefit from continued PT  Tolerated new goblet squats well without pain  Plan: Continue per plan of care  Potential discharge next visit          Precautions: chronic pain; 2 units max billing     Daily Treatment Diary      Manual 12/10/19  12/17 12/23 12/26 12/30 1/6 1/13 1/21     TPR psoas/erector spinae nv 8' 8'  10' total  nv 5'                  Exercise Diary                     bike nv 5' lvl 1 5' lvl 1   5' lvl 1 5' lvl 1   5' lvl 1   5' lvl 1  5' llv 1       ADIM* 10x5" 10x5" 10x5''   10"x5 10''x5 10x5''   dc         ADIM marches* 3x6 3x6 3x8    3x8   3x8    3x10    dc         ADIM heel taps  nv 3x5   3x5   nv   3x6   3x8         Bridges* 3x10 3x10 3x12   3x12   3x10 shallow   3x12   3x15  3x12 30#                           Low Rows* nv GTB 3x10 GTB 3x12   GTB  3x12   GTB 3x12   GTB 3x15   BTB nv  BTB 3x12       TG  3x15 L 22 3x15 L22   3x15  L22   3x10 L19   3x12 L20   3x12 L 22  np       Goblet squat nv pain           3x10 10#       Pallof rotation seated*  10x5"ea x2 GTB 10x5'' ea x2 GTB   10x5"   nv   OTB 2x10 ea   dc         Mark walks     10# 3 laps   nv   20# 3 laps   30# 3 laps ea  30# 4 laps       Suitcase walks     10# 3 laps each   nv   20# 3 laps ea   30# 3 laps ea  30# 4 laps ea        Paloff press seated           GTB 2x10 ea   GTB 2x10 ea   GTB standing 2x10x5" ea np       paloff rotation        GTB 2x10 ea      Modalities                          P          10'                   *=on HEP

## 2020-01-22 ENCOUNTER — TELEPHONE (OUTPATIENT)
Dept: FAMILY MEDICINE CLINIC | Facility: OTHER | Age: 50
End: 2020-01-22

## 2020-01-22 LAB
ALBUMIN SERPL-MCNC: 4.7 G/DL (ref 3.6–5.1)
ALBUMIN/GLOB SERPL: 2.4 (CALC) (ref 1–2.5)
ALP SERPL-CCNC: 59 U/L (ref 40–115)
ALT SERPL-CCNC: 61 U/L (ref 9–46)
AST SERPL-CCNC: 27 U/L (ref 10–40)
BILIRUB SERPL-MCNC: 0.8 MG/DL (ref 0.2–1.2)
BUN SERPL-MCNC: 14 MG/DL (ref 7–25)
BUN/CREAT SERPL: ABNORMAL (CALC) (ref 6–22)
CALCIUM SERPL-MCNC: 10 MG/DL (ref 8.6–10.3)
CHLORIDE SERPL-SCNC: 102 MMOL/L (ref 98–110)
CHOLEST SERPL-MCNC: 207 MG/DL
CHOLEST/HDLC SERPL: 5.6 (CALC)
CO2 SERPL-SCNC: 30 MMOL/L (ref 20–32)
CREAT SERPL-MCNC: 0.96 MG/DL (ref 0.6–1.35)
GLOBULIN SER CALC-MCNC: 2 G/DL (CALC) (ref 1.9–3.7)
GLUCOSE SERPL-MCNC: 220 MG/DL (ref 65–99)
HBA1C MFR BLD: 8.4 % OF TOTAL HGB
HDLC SERPL-MCNC: 37 MG/DL
HIV 1+2 AB+HIV1 P24 AG SERPL QL IA: NORMAL
LDLC SERPL CALC-MCNC: 133 MG/DL (CALC)
NONHDLC SERPL-MCNC: 170 MG/DL (CALC)
POTASSIUM SERPL-SCNC: 4.2 MMOL/L (ref 3.5–5.3)
PROT SERPL-MCNC: 6.7 G/DL (ref 6.1–8.1)
SL AMB EGFR AFRICAN AMERICAN: 109 ML/MIN/1.73M2
SL AMB EGFR NON AFRICAN AMERICAN: 94 ML/MIN/1.73M2
SODIUM SERPL-SCNC: 140 MMOL/L (ref 135–146)
TRIGL SERPL-MCNC: 220 MG/DL

## 2020-01-22 NOTE — TELEPHONE ENCOUNTER
Spoke with patient and he is scheduled for 1/28/2020 for follow up to discuss labs (A1C) sugar levels  As per Dr Lonny Quarles message

## 2020-01-27 ENCOUNTER — OFFICE VISIT (OUTPATIENT)
Dept: PHYSICAL THERAPY | Facility: REHABILITATION | Age: 50
End: 2020-01-27
Payer: COMMERCIAL

## 2020-01-27 DIAGNOSIS — G89.29 CHRONIC RIGHT-SIDED LOW BACK PAIN WITH RIGHT-SIDED SCIATICA: Primary | ICD-10-CM

## 2020-01-27 DIAGNOSIS — M51.26 HERNIATED NUCLEUS PULPOSUS, L3-4 RIGHT: ICD-10-CM

## 2020-01-27 DIAGNOSIS — M54.41 CHRONIC RIGHT-SIDED LOW BACK PAIN WITH RIGHT-SIDED SCIATICA: Primary | ICD-10-CM

## 2020-01-27 PROCEDURE — 97112 NEUROMUSCULAR REEDUCATION: CPT

## 2020-01-27 PROCEDURE — 97110 THERAPEUTIC EXERCISES: CPT

## 2020-01-27 NOTE — PROGRESS NOTES
Daily Note     Today's date: 2020  Patient name: James Robledo  : 1970  MRN: [id number]  Referring provider: Alvaro Ceron DO  Dx:   Encounter Diagnosis     ICD-10-CM    1  Chronic right-sided low back pain with right-sided sciatica M54 41     G89 29    2  Herniated nucleus pulposus, L3-4 right M51 26        Start Time: 1456  Stop Time: 1530  Total time in clinic (min): 34 minutes    Subjective: Patient reports feeling "good, little stiff" prior to session today  He states lifting at work and while at [employer], no complaints  Objective: See treatment diary below      Assessment: Tolerated treatment well  Patient demonstrated fatigue post treatment and exhibited good technique with therapeutic exercises      Plan: Patient given updated HEP this session with patient reporting understanding  Patient to transition to HEP       Precautions: chronic pain; 2 units max billing     Daily Treatment Diary      Manual 12/10/19  12/17 12/23 12/26 12/30 1/6 1/13 1/21 1/27    TPR psoas/erector spinae nv 8' 8'  10' total  nv 5'                  Exercise Diary                     bike nv 5' lvl 1 5' lvl 1   5' lvl 1 5' lvl 1   5' lvl 1   5' lvl 1   5' llv 1  5' lvl 1     ADIM* 10x5" 10x5" 10x5''   10"x5 10''x5 10x5''   dc         ADIM marches* 3x6 3x6 3x8    3x8   3x8    3x10    dc         ADIM heel taps  nv 3x5   3x5   nv   3x6   3x8    3x10     Bridges* 3x10 3x10 3x12   3x12   3x10 shallow   3x12   3x15   3x12 30#  3x15 30#                          Low Rows* nv GTB 3x10 GTB 3x12   GTB  3x12   GTB 3x12   GTB 3x15   BTB nv   BTB 3x12  BTB 3x15      TG  3x15 L 22 3x15 L22   3x15  L22   3x10 L19   3x12 L20   3x12 L 22   np       Goblet squat nv pain            3x10 10#  3x12 12#      Pallof rotation seated*  10x5"ea x2 GTB 10x5'' ea x2 GTB   10x5"   nv   OTB 2x10 ea   dc         Mark walks     10# 3 laps   nv   20# 3 laps   30# 3 laps ea   30# 4 laps  30# 4 laps      Suitcase walks     10# 3 laps each   nv 20# 3 laps ea   30# 3 laps ea   30# 4 laps ea  30# 4 laps ea      Paloff press seated           GTB 2x10 ea   GTB 2x10 ea   GTB standing 2x10x5" ea np       paloff rotation        GTB 2x10 ea GTB 20 ea     Modalities                          MHP          10'                   *=on HEP

## 2020-02-11 ENCOUNTER — OFFICE VISIT (OUTPATIENT)
Dept: FAMILY MEDICINE CLINIC | Facility: OTHER | Age: 50
End: 2020-02-11
Payer: COMMERCIAL

## 2020-02-11 VITALS
TEMPERATURE: 99.8 F | DIASTOLIC BLOOD PRESSURE: 90 MMHG | WEIGHT: 172.13 LBS | HEIGHT: 67 IN | HEART RATE: 85 BPM | BODY MASS INDEX: 27.02 KG/M2 | SYSTOLIC BLOOD PRESSURE: 110 MMHG | OXYGEN SATURATION: 97 %

## 2020-02-11 DIAGNOSIS — E11.9 TYPE 2 DIABETES MELLITUS WITHOUT COMPLICATION, WITHOUT LONG-TERM CURRENT USE OF INSULIN (HCC): ICD-10-CM

## 2020-02-11 DIAGNOSIS — R73.01 IMPAIRED FASTING GLUCOSE: Primary | ICD-10-CM

## 2020-02-11 DIAGNOSIS — N52.9 ERECTILE DYSFUNCTION, UNSPECIFIED ERECTILE DYSFUNCTION TYPE: ICD-10-CM

## 2020-02-11 PROCEDURE — 1036F TOBACCO NON-USER: CPT | Performed by: FAMILY MEDICINE

## 2020-02-11 PROCEDURE — 99214 OFFICE O/P EST MOD 30 MIN: CPT | Performed by: FAMILY MEDICINE

## 2020-02-11 RX ORDER — ASPIRIN 81 MG/1
81 TABLET ORAL DAILY
Qty: 30 TABLET | COMMUNITY
Start: 2020-02-11

## 2020-02-11 NOTE — PROGRESS NOTES
Assessment/Plan:      Patient was advised to continue low carb diet and regular exercise  At 4 minutes started as per details below  He will take a baby aspirin at nights for prophylaxis risk benefits adverse effects of the 2 medications discussed in detail with patient and he agrees to start  He will take low-fat diet for now and refusing statins will recheck his labs in 3 months and take it from there  He also would like a referral to urology because of erectile dysfunction which has been made today       Problem List Items Addressed This Visit        Endocrine    Impaired fasting glucose - Primary    Relevant Medications    metFORMIN (GLUCOPHAGE) 500 mg tablet    aspirin (ECOTRIN LOW STRENGTH) 81 mg EC tablet    Other Relevant Orders    Ambulatory referral to Nutrition Services    Basic metabolic panel    Lipid panel    HEMOGLOBIN A1C W/ EAG ESTIMATION    Microalbumin / creatinine urine ratio      Other Visit Diagnoses     Erectile dysfunction, unspecified erectile dysfunction type        Relevant Orders    Ambulatory referral to Urology    Type 2 diabetes mellitus without complication, without long-term current use of insulin (HCC)        Relevant Medications    metFORMIN (GLUCOPHAGE) 500 mg tablet            Subjective:      Patient ID: Severo Penna is a 54 y o  male  Patient is here to go over lab results today  From the a review of chart it appears that he has pre diabetes which has been getting worse to the point where today his A1C is at 8 4 apparently he is a diabetic now  He denies having polyuria polydipsia polyphasia  Denies any vision changes denies having any neurologic deficits or numbness in his extremities  Of note does have diabetes running in the family he tries to take low carb diet he also tries to be more active and exercise regularly      We had a lengthy discussion about care of diabetes as well as carb counting low carb diet regular exercise and its benefit to blood sugar regulation as well as starting him on low does low-dose medication  He he is agreeable to starting metformin however he is not interested in any medications for his cholesterol he is agreeable to taking a baby aspirin at bedtime as well  I will order labs to be checked in 3 months to see his progression also to check his A1c  Patient was also recommended to see a nutritionist         The following portions of the patient's history were reviewed and updated as appropriate:   He  has a past medical history of History of illicit drug use  He   Patient Active Problem List    Diagnosis Date Noted    Right groin pain 10/28/2019    Impaired fasting glucose 08/13/2018    Essential hypertriglyceridemia 01/26/2015    Fatigue 01/15/2015    Orthopnea 01/15/2015    Anxiety 08/06/2012    Carter esophagus 08/06/2012    Esophageal reflux 05/08/2012     He  has a past surgical history that includes Cholecystectomy; Colonoscopy w/ biopsies (11/2011); and Cholecystectomy  His family history includes Diabetes in his mother; Hyperlipidemia in his father and mother; Hypertension in his mother  He  reports that he has quit smoking  He has quit using smokeless tobacco  He reports that he drinks about 2 0 standard drinks of alcohol per week  He reports that he does not use drugs  Current Outpatient Medications   Medication Sig Dispense Refill    omeprazole (PriLOSEC) 40 MG capsule Take 1 capsule (40 mg total) by mouth daily 90 capsule 1    aspirin (ECOTRIN LOW STRENGTH) 81 mg EC tablet Take 1 tablet (81 mg total) by mouth daily 30 tablet     metFORMIN (GLUCOPHAGE) 500 mg tablet Take 1 tablet (500 mg total) by mouth daily with breakfast 30 tablet 2     No current facility-administered medications for this visit        Current Outpatient Medications on File Prior to Visit   Medication Sig    omeprazole (PriLOSEC) 40 MG capsule Take 1 capsule (40 mg total) by mouth daily     No current facility-administered medications on file prior to visit  He has No Known Allergies       Review of Systems   Constitutional: Negative for appetite change, chills, fatigue and fever  HENT: Negative for congestion  Eyes: Negative for visual disturbance  Respiratory: Negative for cough and shortness of breath  Cardiovascular: Negative for chest pain and leg swelling  Gastrointestinal: Negative for abdominal pain, constipation and diarrhea  Endocrine: Negative for polydipsia, polyphagia and polyuria  Genitourinary: Negative for difficulty urinating, discharge and testicular pain  Musculoskeletal: Negative for arthralgias and myalgias  Skin: Negative for pallor and rash  Allergic/Immunologic: Negative for environmental allergies  Neurological: Negative for dizziness, weakness and headaches  Psychiatric/Behavioral: Negative for decreased concentration and sleep disturbance  The patient is not nervous/anxious  Objective:      /90 (BP Location: Left arm, Patient Position: Sitting, Cuff Size: Adult)   Pulse 85   Temp 99 8 °F (37 7 °C) (Tympanic)   Ht 5' 7 25" (1 708 m)   Wt 78 1 kg (172 lb 2 oz)   SpO2 97%   BMI 26 76 kg/m²          Physical Exam   Constitutional: He appears well-developed and well-nourished  No distress  HENT:   Head: Normocephalic and atraumatic  Mouth/Throat: No oropharyngeal exudate  Eyes: Right eye exhibits no discharge  Left eye exhibits no discharge  No scleral icterus  Neck: Normal range of motion  Neck supple  Cardiovascular: Normal rate, regular rhythm, normal heart sounds and intact distal pulses  No murmur heard  Pulmonary/Chest: Effort normal and breath sounds normal  No respiratory distress  He has no wheezes  Abdominal: Soft  Bowel sounds are normal  He exhibits no distension  There is no tenderness  Skin: Skin is warm and dry  No rash noted  He is not diaphoretic  No erythema  No pallor  Psychiatric: He has a normal mood and affect   His behavior is normal  Nursing note and vitals reviewed        Lab Results   Component Value Date    WBC 6 6 07/31/2018    HGB 15 0 07/31/2018    HCT 43 8 07/31/2018     07/31/2018    CHOL 192 09/09/2015    TRIG 220 (H) 01/21/2020    HDL 37 (L) 01/21/2020    ALT 61 (H) 01/21/2020    AST 27 01/21/2020    K 4 2 01/21/2020     01/21/2020    CREATININE 0 96 01/21/2020    BUN 14 01/21/2020    CO2 30 01/21/2020    HGBA1C 8 4 (H) 01/21/2020         Lab Results   Component Value Date    WBC 6 6 07/31/2018    HGB 15 0 07/31/2018    HCT 43 8 07/31/2018     07/31/2018    CHOL 192 09/09/2015    TRIG 220 (H) 01/21/2020    HDL 37 (L) 01/21/2020    ALT 61 (H) 01/21/2020    AST 27 01/21/2020    K 4 2 01/21/2020     01/21/2020    CREATININE 0 96 01/21/2020    BUN 14 01/21/2020    CO2 30 01/21/2020    HGBA1C 8 4 (H) 01/21/2020

## 2020-02-11 NOTE — PATIENT INSTRUCTIONS
Basic Carbohydrate Counting   AMBULATORY CARE:   Carbohydrate counting  is a way to plan your meals by counting the amount of carbohydrate in foods  Carbohydrates are the sugars, starches, and fiber found in fruit, grains, vegetables, and milk products  Carbohydrates increase your blood sugar levels  Carbohydrate counting can help you eat the right amount of carbohydrate to keep your blood sugar levels under control  What you need to know about planning meals using carbohydrate counting:  · A dietitian or healthcare provider will help you develop a healthy meal plan that works best for you  You will be taught how much carbohydrate to eat or drink for each meal and snack  Your meal plan will be based on your age, weight, usual food intake, and physical activity level  If you have diabetes, it will also include your blood sugar levels and diabetes medicine  Once you know how much carbohydrate you should eat, you can decide what type of food you want to eat  · You will need to know what foods contain carbohydrate and how much they contain  Keep track of the amount of carbohydrate in meals and snacks in order to follow your meal plan  Do not avoid carbohydrates or skip meals  Your blood sugar may fall too low if you do not eat enough carbohydrate or you skip meals  Foods that contain carbohydrate:   · Breads:  Each serving of food listed below contains about 15 g of carbohydrate   ¨ 1 slice of bread (1 ounce) or 1 flour or corn tortilla (6 inch)    ¨ ½ of a hamburger bun or ¼ of a large bagel (about 1 ounce)    ¨ 1 pancake (about 4 inches across and ¼ inch thick)    · Cereals and grains:  Serving sizes of ready-to-eat cereals vary  Look at the serving size and the total carbohydrate amount listed on the food label  Each serving of food listed below contains about 15 g of carbohydrate       ¨ ¾ cup of dry, unsweetened, ready-to-eat cereal or ¼ cup of low-fat granola     ¨ ½ cup of oatmeal or other cooked cereal ¨ ? cup of cooked rice or pasta    · Starchy vegetables and beans:  Each serving of food listed below contains about 15 g of carbohydrate   ¨ ½ cup of corn, green peas, sweet potatoes, or mashed potatoes    ¨ ¼ of a large baked potato    ¨ ½ cup of beans, lentils, and peas (garbanzo, berry, kidney, white, split, black-eyed)    · Crackers and snacks:  Each serving of food listed below contains about 15 g of carbohydrate   ¨ 3 kati cracker squares or 8 animal crackers     ¨ 6 saltine-type crackers    ¨ 3 cups of popcorn or ¾ ounce of pretzels, potato chips, or tortilla chips    · Fruit:  Each serving of food listed below contains about 15 g of carbohydrate   ¨ 1 small (4 ounce) piece of fresh fruit or ¾ to 1 cup of fresh fruit    ¨ ½ cup of canned or frozen fruit, packed in natural juice    ¨ ½ cup (4 ounces) of unsweetened fruit juice    ¨ 2 tablespoons of dried fruit    · Desserts or sugary foods:  Each serving of food listed below contains about 15 g of carbohydrate   ¨ 2-inch square unfrosted cake or brownie     ¨ 2 small cookies    ¨ ½ cup of ice cream, frozen yogurt, or nondairy frozen yogurt    ¨ ¼ cup of sherbet or sorbet    ¨ 1 tablespoon of regular syrup, jam, or jelly    ¨ 2 tablespoons of light syrup    · Milk and yogurt:  Foods from the milk group contain about 12 g of carbohydrate per serving  ¨ 1 cup of fat-free or low-fat milk    ¨ 1 cup of soy milk    ¨ ? cup of fat-free, yogurt sweetened with artificial sweetener    · Non-starchy vegetables:  Each serving contains about 5 g of carbohydrate   Three servings of non-starch vegetables count as 1 carbohydrate serving  ¨ ½ cup of cooked vegetables or 1 cup of raw vegetables  This includes beets, broccoli, cabbage, cauliflower, cucumber, mushrooms, tomatoes, and zucchini    ¨ ½ cup of vegetable juice  How to use carbohydrate counting to plan meals:   · Count carbohydrate amounts using serving sizes:      ¨ Pasta dinner example:   You plan to have pasta, tossed salad, and an 8-ounce glass of milk  Your healthcare provider tells you that you may have 4 carbohydrate servings for dinner  One carbohydrate serving of pasta is ? cup  One cup of pasta will equal 3 carbohydrate servings  An 8-ounce glass of milk will count as 1 carbohydrate serving  These amounts of food would equal 4 carbohydrate servings  One cup of tossed salad does not count toward your carbohydrate servings  · Count carbohydrate amounts using food labels:  Find the total amount of carbohydrate in a packaged food by reading the food label  Food labels tell you the serving size of the food and the total carbohydrate amount in each serving  Find the serving size on the food label and then decide how many servings you will eat  Multiply the number of servings you plan to eat by the carbohydrate amount per serving  ¨ Granola bar snack example: Your meal plan allows you to have 2 carbohydrate servings (30 grams) of carbohydrate for a snack  You plan to eat 1 package of granola bars, which contains 2 bars  According to the food label, the serving size of food in this package is 1 bar  Each serving (1 bar) contains 25 grams of carbohydrate  The total amount of carbohydrate in this package of granola bars would be 50 g  Based on your meal plan, you should eat only 1 bar  Follow up with your healthcare provider as directed:  Write down your questions so you remember to ask them during your visits  © 2017 [employer] Information is for End User's use only and may not be sold, redistributed or otherwise used for commercial purposes  All illustrations and images included in CareNotes® are the copyrighted property of [employer]  or Misael Mojica  The above information is an  only  It is not intended as medical advice for individual conditions or treatments   Talk to your doctor, nurse or pharmacist before following any medical regimen to see if it is safe and effective for you

## 2020-02-19 NOTE — PROGRESS NOTES
PT Discharge    Today's date: 2020  Patient name: Melvi Edwards  : 1970  MRN: [id number]  Referring provider: Asha Maldonado DO  Dx:   Encounter Diagnosis     ICD-10-CM    1  Chronic right-sided low back pain with right-sided sciatica M54 41     G89 29    2  Herniated nucleus pulposus, L3-4 right M51 26        Start Time: 1456  Stop Time: 1530  Total time in clinic (min): 34 minutes    Assessment/Plan  Melvi Edwards has not returned since last appointment, unable to perform objective measures since then  It is assumed they have returned to prior level of function and do not desire additional physical therapy  At this time, Melvi Edwards will be discharged from physical therapy services      Subjective    Objective

## 2020-03-19 ENCOUNTER — TELEPHONE (OUTPATIENT)
Dept: UROLOGY | Facility: CLINIC | Age: 50
End: 2020-03-19

## 2020-03-19 NOTE — TELEPHONE ENCOUNTER
Please reschedule patient is non urgent consultation for in 3 months as we are limiting current visits to urgent emergent visits due to concerns with COVID-19

## 2020-05-16 DIAGNOSIS — R73.01 IMPAIRED FASTING GLUCOSE: ICD-10-CM

## 2020-05-27 DIAGNOSIS — K21.9 GASTROESOPHAGEAL REFLUX DISEASE, ESOPHAGITIS PRESENCE NOT SPECIFIED: ICD-10-CM

## 2020-05-27 RX ORDER — OMEPRAZOLE 40 MG/1
40 CAPSULE, DELAYED RELEASE ORAL DAILY
Qty: 90 CAPSULE | Refills: 1 | Status: SHIPPED | OUTPATIENT
Start: 2020-05-27 | End: 2020-07-21 | Stop reason: SDUPTHER

## 2020-07-08 LAB
ALBUMIN/CREAT UR: 12 MCG/MG CREAT
BUN SERPL-MCNC: 11 MG/DL (ref 7–25)
BUN/CREAT SERPL: ABNORMAL (CALC) (ref 6–22)
CALCIUM SERPL-MCNC: 9.8 MG/DL (ref 8.6–10.3)
CHLORIDE SERPL-SCNC: 102 MMOL/L (ref 98–110)
CHOLEST SERPL-MCNC: 218 MG/DL
CHOLEST/HDLC SERPL: 5.1 (CALC)
CO2 SERPL-SCNC: 28 MMOL/L (ref 20–32)
CREAT SERPL-MCNC: 0.85 MG/DL (ref 0.6–1.35)
CREAT UR-MCNC: 150 MG/DL (ref 20–320)
EST. AVERAGE GLUCOSE BLD GHB EST-MCNC: 174 (CALC)
EST. AVERAGE GLUCOSE BLD GHB EST-SCNC: 9.7 (CALC)
GLUCOSE SERPL-MCNC: 162 MG/DL (ref 65–99)
HBA1C MFR BLD: 7.7 % OF TOTAL HGB
HDLC SERPL-MCNC: 43 MG/DL
LDLC SERPL CALC-MCNC: 122 MG/DL (CALC)
MICROALBUMIN UR-MCNC: 1.8 MG/DL
NONHDLC SERPL-MCNC: 175 MG/DL (CALC)
POTASSIUM SERPL-SCNC: 4.7 MMOL/L (ref 3.5–5.3)
SL AMB EGFR AFRICAN AMERICAN: 119 ML/MIN/1.73M2
SL AMB EGFR NON AFRICAN AMERICAN: 103 ML/MIN/1.73M2
SODIUM SERPL-SCNC: 139 MMOL/L (ref 135–146)
TRIGL SERPL-MCNC: 374 MG/DL

## 2020-07-08 PROCEDURE — 3061F NEG MICROALBUMINURIA REV: CPT | Performed by: FAMILY MEDICINE

## 2020-07-08 PROCEDURE — 3051F HG A1C>EQUAL 7.0%<8.0%: CPT | Performed by: FAMILY MEDICINE

## 2020-07-21 ENCOUNTER — OFFICE VISIT (OUTPATIENT)
Dept: FAMILY MEDICINE CLINIC | Facility: OTHER | Age: 50
End: 2020-07-21
Payer: COMMERCIAL

## 2020-07-21 VITALS
SYSTOLIC BLOOD PRESSURE: 132 MMHG | HEART RATE: 84 BPM | WEIGHT: 181.25 LBS | OXYGEN SATURATION: 96 % | BODY MASS INDEX: 28.45 KG/M2 | DIASTOLIC BLOOD PRESSURE: 90 MMHG | TEMPERATURE: 98.2 F | HEIGHT: 67 IN

## 2020-07-21 DIAGNOSIS — E78.1 ESSENTIAL HYPERTRIGLYCERIDEMIA: ICD-10-CM

## 2020-07-21 DIAGNOSIS — E11.9 TYPE 2 DIABETES MELLITUS WITHOUT COMPLICATION, WITHOUT LONG-TERM CURRENT USE OF INSULIN (HCC): Primary | ICD-10-CM

## 2020-07-21 DIAGNOSIS — K21.9 GASTROESOPHAGEAL REFLUX DISEASE, ESOPHAGITIS PRESENCE NOT SPECIFIED: ICD-10-CM

## 2020-07-21 PROCEDURE — 1036F TOBACCO NON-USER: CPT | Performed by: FAMILY MEDICINE

## 2020-07-21 PROCEDURE — 99213 OFFICE O/P EST LOW 20 MIN: CPT | Performed by: FAMILY MEDICINE

## 2020-07-21 PROCEDURE — 3008F BODY MASS INDEX DOCD: CPT | Performed by: FAMILY MEDICINE

## 2020-07-21 RX ORDER — OMEPRAZOLE 40 MG/1
40 CAPSULE, DELAYED RELEASE ORAL DAILY
Qty: 90 CAPSULE | Refills: 1 | Status: SHIPPED | OUTPATIENT
Start: 2020-07-21 | End: 2020-10-23 | Stop reason: SDUPTHER

## 2020-07-21 NOTE — PROGRESS NOTES
Assessment/Plan:    No problem-specific Assessment & Plan notes found for this encounter  Diagnoses and all orders for this visit:    Type 2 diabetes mellitus without complication, without long-term current use of insulin ([facility])  Comments:  A1c down to 7 7% from 8 4% in Jan  Cont metformin daily  Encouraged Nutrition c/s and formal Eye exam  Labs due in 3 mo with flu and Pneumovax  Orders:  -     Comprehensive metabolic panel; Future  -     Hemoglobin A1C; Future  -     metFORMIN (GLUCOPHAGE) 500 mg tablet; Take 1 tablet (500 mg total) by mouth daily with breakfast  -     Ambulatory referral to Ophthalmology; Future  -      Diabetes Eye Exam    Essential hypertriglyceridemia  Comments:  Poorly controlled with diet  Reviewed whole-food, plant-based approach  will recheck labs in 3 mo and consider meds if no improvement  Orders:  -     Comprehensive metabolic panel; Future  -     Lipid panel; Future    Gastroesophageal reflux disease, esophagitis presence not specified  Comments:  Stable, refill granted  Orders:  -     omeprazole (PriLOSEC) 40 MG capsule; Take 1 capsule (40 mg total) by mouth daily        BMI Counseling: Body mass index is 28 18 kg/m²  The BMI is above normal  Nutrition recommendations include decreasing portion sizes, encouraging healthy choices of fruits and vegetables, decreasing fast food intake, consuming healthier snacks, limiting drinks that contain sugar, moderation in carbohydrate intake, increasing intake of lean protein, reducing intake of saturated and trans fat and reducing intake of cholesterol  Exercise recommendations include moderate physical activity 150 minutes/week  Return in about 3 months (around 10/21/2020) for Recheck DM, labs due prior  The patient indicates understanding of these issues and agrees with the plan  Subjective:      Patient ID: Marek Wyatt is a 52 y o  male      Pt presents for DM follow up  Was dx in January with T2DM and started on metformin QD  He reports good medication compliance and tolerance  Has not yet met with Nutrition d/t COVID    Diabetes   He presents for his follow-up diabetic visit  He has type 2 diabetes mellitus  The initial diagnosis of diabetes was made 6 months ago  His disease course has been improving  There are no hypoglycemic associated symptoms  Pertinent negatives for hypoglycemia include no dizziness, headaches or nervousness/anxiousness  There are no diabetic associated symptoms  Pertinent negatives for diabetes include no chest pain and no fatigue  There are no hypoglycemic complications  There are no diabetic complications  Risk factors for coronary artery disease include dyslipidemia, family history and male sex  Current diabetic treatment includes oral agent (monotherapy)  He is compliant with treatment all of the time  His weight is stable  He is following a generally healthy diet  When asked about meal planning, he reported none  He has not had a previous visit with a dietitian  He participates in exercise intermittently  His home blood glucose trend is decreasing steadily  An ACE inhibitor/angiotensin II receptor blocker is not being taken  He does not see a podiatrist Eye exam is current  The following portions of the patient's history were reviewed and updated as appropriate: allergies, current medications, past family history, past medical history, past social history, past surgical history and problem list       Current Outpatient Medications:     aspirin (ECOTRIN LOW STRENGTH) 81 mg EC tablet, Take 1 tablet (81 mg total) by mouth daily, Disp: 30 tablet, Rfl:     metFORMIN (GLUCOPHAGE) 500 mg tablet, Take 1 tablet (500 mg total) by mouth daily with breakfast, Disp: 30 tablet, Rfl: 2    omeprazole (PriLOSEC) 40 MG capsule, Take 1 capsule (40 mg total) by mouth daily, Disp: 90 capsule, Rfl: 1      Review of Systems   Constitutional: Negative for activity change, fatigue and fever     HENT: Negative for congestion, ear pain, sinus pain and sore throat  Eyes: Negative for pain, itching and visual disturbance  Respiratory: Negative for cough and shortness of breath  Cardiovascular: Negative for chest pain, palpitations and leg swelling  Gastrointestinal: Negative for abdominal pain, constipation, diarrhea, nausea and vomiting  Endocrine: Negative for cold intolerance and heat intolerance  Genitourinary: Negative for dysuria  Musculoskeletal: Negative for myalgias  Skin: Negative for color change and rash  Neurological: Negative for dizziness, syncope and headaches  Hematological: Negative for adenopathy  Psychiatric/Behavioral: Negative for dysphoric mood  The patient is not nervous/anxious  Objective:      /90 (BP Location: Left arm, Patient Position: Sitting, Cuff Size: Large)   Pulse 84   Temp 98 2 °F (36 8 °C) (Temporal)   Ht 5' 7 25" (1 708 m)   Wt 82 2 kg (181 lb 4 oz)   SpO2 96%   BMI 28 18 kg/m²          Physical Exam   Constitutional: He is oriented to person, place, and time  He appears well-developed and well-nourished  No distress  HENT:   Head: Normocephalic and atraumatic  Mouth/Throat: Oropharynx is clear and moist    Eyes: Pupils are equal, round, and reactive to light  Conjunctivae and EOM are normal  No scleral icterus  Neck: Normal range of motion  Neck supple  Cardiovascular: Normal rate, regular rhythm and normal heart sounds  Pulses are no weak pulses  No murmur heard  Pulses:       Dorsalis pedis pulses are 2+ on the right side, and 2+ on the left side  Posterior tibial pulses are 2+ on the right side, and 2+ on the left side  Pulmonary/Chest: Effort normal and breath sounds normal  No respiratory distress  Abdominal: Soft  Bowel sounds are normal  He exhibits no distension  There is no tenderness  Musculoskeletal: Normal range of motion  He exhibits no edema     Feet:   Right Foot:   Skin Integrity: Negative for ulcer, skin breakdown, erythema, warmth, callus or dry skin  Left Foot:   Skin Integrity: Negative for ulcer, skin breakdown, erythema, warmth, callus or dry skin  Lymphadenopathy:     He has no cervical adenopathy  Neurological: He is alert and oriented to person, place, and time  Skin: Skin is warm and dry  No rash noted  Psychiatric: He has a normal mood and affect  His behavior is normal    Nursing note and vitals reviewed  Patient's shoes and socks removed  Right Foot/Ankle   Right Foot Inspection  Skin Exam: skin normal and skin intact no dry skin, no warmth, no callus, no erythema, no maceration, no abnormal color, no pre-ulcer, no ulcer and no callus                          Toe Exam: ROM and strength within normal limits  Sensory   Vibration: intact  Proprioception: intact   Monofilament testing: intact  Vascular  Capillary refills: < 3 seconds  The right DP pulse is 2+  The right PT pulse is 2+  Left Foot/Ankle  Left Foot Inspection  Skin Exam: skin normal and skin intactno dry skin, no warmth, no erythema, no maceration, normal color, no pre-ulcer, no ulcer and no callus                         Toe Exam: ROM and strength within normal limits                   Sensory   Vibration: intact  Proprioception: intact  Monofilament: intact  Vascular  Capillary refills: < 3 seconds  The left DP pulse is 2+  The left PT pulse is 2+  Assign Risk Category:  No deformity present; No loss of protective sensation;  No weak pulses       Risk: 0

## 2020-07-23 RX ORDER — BLOOD-GLUCOSE METER
KIT MISCELLANEOUS
Qty: 1 EACH | Refills: 1 | Status: SHIPPED | OUTPATIENT
Start: 2020-07-23

## 2020-07-23 RX ORDER — LANCETS 28 GAUGE
EACH MISCELLANEOUS
Qty: 100 EACH | Refills: 11 | Status: SHIPPED | OUTPATIENT
Start: 2020-07-23

## 2020-10-23 DIAGNOSIS — E11.9 TYPE 2 DIABETES MELLITUS WITHOUT COMPLICATION, WITHOUT LONG-TERM CURRENT USE OF INSULIN (HCC): ICD-10-CM

## 2020-10-23 DIAGNOSIS — K21.9 GASTROESOPHAGEAL REFLUX DISEASE: ICD-10-CM

## 2020-10-26 RX ORDER — OMEPRAZOLE 40 MG/1
40 CAPSULE, DELAYED RELEASE ORAL DAILY
Qty: 90 CAPSULE | Refills: 1 | Status: SHIPPED | OUTPATIENT
Start: 2020-10-26 | End: 2021-02-04 | Stop reason: SDUPTHER

## 2020-11-17 ENCOUNTER — OFFICE VISIT (OUTPATIENT)
Dept: FAMILY MEDICINE CLINIC | Facility: OTHER | Age: 50
End: 2020-11-17
Payer: COMMERCIAL

## 2020-11-17 VITALS
DIASTOLIC BLOOD PRESSURE: 100 MMHG | HEART RATE: 85 BPM | TEMPERATURE: 96.3 F | SYSTOLIC BLOOD PRESSURE: 134 MMHG | OXYGEN SATURATION: 97 % | BODY MASS INDEX: 27.94 KG/M2 | HEIGHT: 67 IN | WEIGHT: 178 LBS

## 2020-11-17 DIAGNOSIS — Z23 ENCOUNTER FOR IMMUNIZATION: ICD-10-CM

## 2020-11-17 DIAGNOSIS — E78.1 ESSENTIAL HYPERTRIGLYCERIDEMIA: ICD-10-CM

## 2020-11-17 DIAGNOSIS — I10 ESSENTIAL HYPERTENSION: ICD-10-CM

## 2020-11-17 DIAGNOSIS — E11.9 TYPE 2 DIABETES MELLITUS WITHOUT COMPLICATION, WITHOUT LONG-TERM CURRENT USE OF INSULIN (HCC): Primary | ICD-10-CM

## 2020-11-17 LAB
ALBUMIN SERPL-MCNC: 5 G/DL (ref 3.6–5.1)
ALBUMIN/GLOB SERPL: 2.4 (CALC) (ref 1–2.5)
ALP SERPL-CCNC: 53 U/L (ref 36–130)
ALT SERPL-CCNC: 35 U/L (ref 9–46)
AST SERPL-CCNC: 22 U/L (ref 10–40)
BILIRUB SERPL-MCNC: 0.9 MG/DL (ref 0.2–1.2)
BUN SERPL-MCNC: 8 MG/DL (ref 7–25)
BUN/CREAT SERPL: ABNORMAL (CALC) (ref 6–22)
CALCIUM SERPL-MCNC: 9.7 MG/DL (ref 8.6–10.3)
CHLORIDE SERPL-SCNC: 104 MMOL/L (ref 98–110)
CHOLEST SERPL-MCNC: 200 MG/DL
CHOLEST/HDLC SERPL: 5 (CALC)
CO2 SERPL-SCNC: 30 MMOL/L (ref 20–32)
CREAT SERPL-MCNC: 0.93 MG/DL (ref 0.6–1.35)
GLOBULIN SER CALC-MCNC: 2.1 G/DL (CALC) (ref 1.9–3.7)
GLUCOSE SERPL-MCNC: 126 MG/DL (ref 65–99)
HBA1C MFR BLD: 7.8 % OF TOTAL HGB
HDLC SERPL-MCNC: 40 MG/DL
LDLC SERPL CALC-MCNC: 117 MG/DL (CALC)
NONHDLC SERPL-MCNC: 160 MG/DL (CALC)
POTASSIUM SERPL-SCNC: 4.1 MMOL/L (ref 3.5–5.3)
PROT SERPL-MCNC: 7.1 G/DL (ref 6.1–8.1)
SL AMB EGFR AFRICAN AMERICAN: 112 ML/MIN/1.73M2
SL AMB EGFR NON AFRICAN AMERICAN: 97 ML/MIN/1.73M2
SODIUM SERPL-SCNC: 142 MMOL/L (ref 135–146)
TRIGL SERPL-MCNC: 299 MG/DL

## 2020-11-17 PROCEDURE — 90471 IMMUNIZATION ADMIN: CPT | Performed by: FAMILY MEDICINE

## 2020-11-17 PROCEDURE — 99214 OFFICE O/P EST MOD 30 MIN: CPT | Performed by: FAMILY MEDICINE

## 2020-11-17 PROCEDURE — 3075F SYST BP GE 130 - 139MM HG: CPT | Performed by: FAMILY MEDICINE

## 2020-11-17 PROCEDURE — 3008F BODY MASS INDEX DOCD: CPT | Performed by: FAMILY MEDICINE

## 2020-11-17 PROCEDURE — 4010F ACE/ARB THERAPY RXD/TAKEN: CPT | Performed by: FAMILY MEDICINE

## 2020-11-17 PROCEDURE — 1036F TOBACCO NON-USER: CPT | Performed by: FAMILY MEDICINE

## 2020-11-17 PROCEDURE — 90686 IIV4 VACC NO PRSV 0.5 ML IM: CPT | Performed by: FAMILY MEDICINE

## 2020-11-17 PROCEDURE — 3080F DIAST BP >= 90 MM HG: CPT | Performed by: FAMILY MEDICINE

## 2020-11-17 PROCEDURE — 3051F HG A1C>EQUAL 7.0%<8.0%: CPT | Performed by: FAMILY MEDICINE

## 2020-11-17 RX ORDER — LISINOPRIL 10 MG/1
10 TABLET ORAL DAILY
Qty: 30 TABLET | Refills: 1 | Status: SHIPPED | OUTPATIENT
Start: 2020-11-17 | End: 2021-01-25 | Stop reason: SDUPTHER

## 2020-12-28 ENCOUNTER — OFFICE VISIT (OUTPATIENT)
Dept: FAMILY MEDICINE CLINIC | Facility: OTHER | Age: 50
End: 2020-12-28
Payer: COMMERCIAL

## 2020-12-28 VITALS — DIASTOLIC BLOOD PRESSURE: 100 MMHG | SYSTOLIC BLOOD PRESSURE: 144 MMHG

## 2020-12-28 DIAGNOSIS — I10 ESSENTIAL HYPERTENSION: Primary | ICD-10-CM

## 2020-12-28 PROCEDURE — 99213 OFFICE O/P EST LOW 20 MIN: CPT | Performed by: FAMILY MEDICINE

## 2020-12-28 PROCEDURE — 3080F DIAST BP >= 90 MM HG: CPT | Performed by: FAMILY MEDICINE

## 2020-12-28 PROCEDURE — 3077F SYST BP >= 140 MM HG: CPT | Performed by: FAMILY MEDICINE

## 2020-12-28 PROCEDURE — 1036F TOBACCO NON-USER: CPT | Performed by: FAMILY MEDICINE

## 2021-01-25 DIAGNOSIS — I10 ESSENTIAL HYPERTENSION: ICD-10-CM

## 2021-01-25 PROCEDURE — 4010F ACE/ARB THERAPY RXD/TAKEN: CPT | Performed by: FAMILY MEDICINE

## 2021-01-25 RX ORDER — LISINOPRIL 10 MG/1
10 TABLET ORAL DAILY
Qty: 90 TABLET | Refills: 1 | Status: SHIPPED | OUTPATIENT
Start: 2021-01-25 | End: 2021-02-26 | Stop reason: SDUPTHER

## 2021-02-04 DIAGNOSIS — K21.9 GASTROESOPHAGEAL REFLUX DISEASE: ICD-10-CM

## 2021-02-04 RX ORDER — OMEPRAZOLE 40 MG/1
40 CAPSULE, DELAYED RELEASE ORAL DAILY
Qty: 90 CAPSULE | Refills: 1 | Status: SHIPPED | OUTPATIENT
Start: 2021-02-04 | End: 2021-06-14 | Stop reason: SDUPTHER

## 2021-02-17 LAB
ALBUMIN SERPL-MCNC: 4.3 G/DL (ref 3.6–5.1)
ALBUMIN/GLOB SERPL: 1.9 (CALC) (ref 1–2.5)
ALP SERPL-CCNC: 53 U/L (ref 36–130)
ALT SERPL-CCNC: 61 U/L (ref 9–46)
AST SERPL-CCNC: 29 U/L (ref 10–40)
BILIRUB SERPL-MCNC: 1.1 MG/DL (ref 0.2–1.2)
BUN SERPL-MCNC: 11 MG/DL (ref 7–25)
BUN/CREAT SERPL: ABNORMAL (CALC) (ref 6–22)
CALCIUM SERPL-MCNC: 9.7 MG/DL (ref 8.6–10.3)
CHLORIDE SERPL-SCNC: 101 MMOL/L (ref 98–110)
CHOLEST SERPL-MCNC: 168 MG/DL
CHOLEST/HDLC SERPL: 4.8 (CALC)
CO2 SERPL-SCNC: 29 MMOL/L (ref 20–32)
CREAT SERPL-MCNC: 0.82 MG/DL (ref 0.6–1.35)
EST. AVERAGE GLUCOSE BLD GHB EST-MCNC: 223 (CALC)
EST. AVERAGE GLUCOSE BLD GHB EST-SCNC: 12.4 (CALC)
GLOBULIN SER CALC-MCNC: 2.3 G/DL (CALC) (ref 1.9–3.7)
GLUCOSE SERPL-MCNC: 239 MG/DL (ref 65–99)
HBA1C MFR BLD: 9.4 % OF TOTAL HGB
HDLC SERPL-MCNC: 35 MG/DL
LDLC SERPL CALC-MCNC: 104 MG/DL (CALC)
NONHDLC SERPL-MCNC: 133 MG/DL (CALC)
POTASSIUM SERPL-SCNC: 4 MMOL/L (ref 3.5–5.3)
PROT SERPL-MCNC: 6.6 G/DL (ref 6.1–8.1)
SL AMB EGFR AFRICAN AMERICAN: 121 ML/MIN/1.73M2
SL AMB EGFR NON AFRICAN AMERICAN: 105 ML/MIN/1.73M2
SODIUM SERPL-SCNC: 137 MMOL/L (ref 135–146)
TRIGL SERPL-MCNC: 170 MG/DL

## 2021-02-17 PROCEDURE — 3046F HEMOGLOBIN A1C LEVEL >9.0%: CPT | Performed by: PSYCHIATRY & NEUROLOGY

## 2021-02-23 ENCOUNTER — OFFICE VISIT (OUTPATIENT)
Dept: FAMILY MEDICINE CLINIC | Facility: OTHER | Age: 51
End: 2021-02-23
Payer: COMMERCIAL

## 2021-02-23 VITALS
OXYGEN SATURATION: 97 % | DIASTOLIC BLOOD PRESSURE: 80 MMHG | SYSTOLIC BLOOD PRESSURE: 126 MMHG | BODY MASS INDEX: 27.12 KG/M2 | HEIGHT: 67 IN | RESPIRATION RATE: 18 BRPM | TEMPERATURE: 97.8 F | WEIGHT: 172.8 LBS | HEART RATE: 93 BPM

## 2021-02-23 DIAGNOSIS — Z23 ENCOUNTER FOR IMMUNIZATION: ICD-10-CM

## 2021-02-23 DIAGNOSIS — R06.83 SNORING: ICD-10-CM

## 2021-02-23 DIAGNOSIS — E11.9 TYPE 2 DIABETES MELLITUS WITHOUT COMPLICATION, WITHOUT LONG-TERM CURRENT USE OF INSULIN (HCC): ICD-10-CM

## 2021-02-23 DIAGNOSIS — Z00.00 ANNUAL PHYSICAL EXAM: Primary | ICD-10-CM

## 2021-02-23 PROCEDURE — 90732 PPSV23 VACC 2 YRS+ SUBQ/IM: CPT

## 2021-02-23 PROCEDURE — 90471 IMMUNIZATION ADMIN: CPT

## 2021-02-23 PROCEDURE — 3725F SCREEN DEPRESSION PERFORMED: CPT | Performed by: FAMILY MEDICINE

## 2021-02-23 PROCEDURE — 99396 PREV VISIT EST AGE 40-64: CPT | Performed by: FAMILY MEDICINE

## 2021-02-23 NOTE — PROGRESS NOTES
ADULT ANNUAL [address]  [facility]    NAME: Kristofer Perez  AGE: 52 y o  SEX: male  : 1970     DATE: 2021     Assessment and Plan:     Choco Alicea was seen today for physical exam and recheck dm labs  Diagnoses and all orders for this visit:    Annual physical exam    BMI 26 0-26 9,adult  -     Ambulatory referral to Sleep Medicine; Future    Type 2 diabetes mellitus without complication, without long-term current use of insulin ([facility])  Comments:   A1c increased to 9 6%   Increase metformin to 1000 mg b i d  Recheck labs in 3 months  Orders:  -     metFORMIN (GLUCOPHAGE) 500 mg tablet; Take 2 tablets (1,000 mg total) by mouth 2 (two) times a day with meals  -     PNEUMOCOCCAL POLYSACCHARIDE VACCINE 23-VALENT =>3YO SQ IM  -     Comprehensive metabolic panel; Future  -     Hemoglobin A1C; Future  -     Lipid panel; Future  -     Microalbumin / creatinine urine ratio; Future    Snoring  Comments:  Formal evaluation with Sleep Medicine ordered today  Orders:  -     Ambulatory referral to Sleep Medicine; Future    Encounter for immunization  -     PNEUMOCOCCAL POLYSACCHARIDE VACCINE 23-VALENT =>3YO SQ IM        Immunizations and preventive care screenings were discussed with patient today  Appropriate education was printed on patient's after visit summary  Counseling:  Alcohol/drug use: discussed moderation in alcohol intake, the recommendations for healthy alcohol use, and avoidance of illicit drug use  Dental Health: discussed importance of regular tooth brushing, flossing, and dental visits  Injury prevention: discussed safety/seat belts, safety helmets, smoke detectors, carbon dioxide detectors, and smoking near bedding or upholstery  Sexual health: discussed sexually transmitted diseases, partner selection, use of condoms, avoidance of unintended pregnancy, and contraceptive alternatives    · Exercise: the importance of regular exercise/physical activity was discussed  Recommend exercise 3-5 times per week for at least 30 minutes  BMI Counseling: Body mass index is 26 86 kg/m²  The BMI is above normal  Nutrition recommendations include decreasing portion sizes, encouraging healthy choices of fruits and vegetables, decreasing fast food intake, consuming healthier snacks, limiting drinks that contain sugar, moderation in carbohydrate intake, increasing intake of lean protein, reducing intake of saturated and trans fat and reducing intake of cholesterol  Exercise recommendations include moderate physical activity 150 minutes/week  Return in about 3 months (around 5/23/2021) for Recheck DM, labs due prior  Chief Complaint:     Chief Complaint   Patient presents with    Physical Exam    Recheck DM labs      History of Present Illness:     Adult Annual Physical   Patient here for a comprehensive physical exam  The patient reports no problems  Diet and Physical Activity  · Diet/Nutrition: poor diet, frequent junk food and limited fruits/vegetables  · Exercise: moderate cardiovascular exercise, 5-7 times a week on average and 30-60 minutes on average  Depression Screening  PHQ-9 Depression Screening    PHQ-9:   Frequency of the following problems over the past two weeks:      Little interest or pleasure in doing things: 0 - not at all  Feeling down, depressed, or hopeless: 0 - not at all  PHQ-2 Score: 0       General Health  · Sleep: sleeps poorly, gets 4-6 hours of sleep on average, snores loudly, experiences daytime hypersomnolence and unrefreshing sleep  · Hearing: normal - bilateral   · Vision: goes for regular eye exams  · Dental: regular dental visits   Health  · Symptoms include: none     Review of Systems:     Review of Systems   Constitutional: Negative for appetite change, fatigue, fever and unexpected weight change     HENT: Negative for congestion, dental problem, ear pain, postnasal drip, sore throat and tinnitus  Eyes: Negative for pain, discharge and visual disturbance  Respiratory: Negative for cough, shortness of breath and wheezing  Cardiovascular: Negative for chest pain, palpitations and leg swelling  Gastrointestinal: Negative for abdominal pain, constipation, diarrhea, nausea and vomiting  Endocrine: Negative for cold intolerance and heat intolerance  Genitourinary: Negative for difficulty urinating, dysuria, flank pain and urgency  Musculoskeletal: Negative for arthralgias, back pain, joint swelling and myalgias  Skin: Negative for rash and wound  Allergic/Immunologic: Negative for immunocompromised state  Neurological: Negative for dizziness, syncope, speech difficulty, weakness and numbness  Hematological: Negative for adenopathy  Does not bruise/bleed easily  Psychiatric/Behavioral: Negative for confusion, dysphoric mood and sleep disturbance  The patient is not nervous/anxious         Past Medical History:     Past Medical History:   Diagnosis Date    History of illicit drug use     cocaine noted in social history      Past Surgical History:     Past Surgical History:   Procedure Laterality Date    CHOLECYSTECTOMY      CHOLECYSTECTOMY      COLONOSCOPY W/ BIOPSIES  11/2011    polyps      Family History:     Family History   Problem Relation Age of Onset    Hyperlipidemia Father     Hypertension Mother     Diabetes Mother     Hyperlipidemia Mother       Social History:        Social History     Socioeconomic History    Marital status: /Civil Union     Spouse name: None    Number of children: None    Years of education: None    Highest education level: None   Occupational History    None   Social Needs    Financial resource strain: None    Food insecurity     Worry: None     Inability: None    Transportation needs     Medical: None     Non-medical: None   Tobacco Use    Smoking status: Former Smoker    Smokeless tobacco: Former User    Tobacco comment: Vape   Substance and Sexual Activity    Alcohol use: Yes     Alcohol/week: 2 0 standard drinks     Types: 2 Cans of beer per week     Comment: weekly    Drug use: No     Comment: not currently    Sexual activity: Yes     Partners: Female   Lifestyle    Physical activity     Days per week: None     Minutes per session: None    Stress: None   Relationships    Social connections     Talks on phone: None     Gets together: None     Attends Zoroastrian service: None     Active member of club or organization: None     Attends meetings of clubs or organizations: None     Relationship status: None    Intimate partner violence     Fear of current or ex partner: None     Emotionally abused: None     Physically abused: None     Forced sexual activity: None   Other Topics Concern    None   Social History Narrative    None      Current Medications:     Current Outpatient Medications   Medication Sig Dispense Refill    aspirin (ECOTRIN LOW STRENGTH) 81 mg EC tablet Take 1 tablet (81 mg total) by mouth daily 30 tablet     glucose blood test strip Test glucose daily or as directed by physician 100 each 11    glucose monitoring kit (FREESTYLE) monitoring kit Test glucose daily or as directed by physician 1 each 1    Lancets (FREESTYLE) lancets Test glucose daily or as directed by physician 100 each 11    lisinopril (ZESTRIL) 10 mg tablet Take 1 tablet (10 mg total) by mouth daily 90 tablet 1    metFORMIN (GLUCOPHAGE) 500 mg tablet Take 2 tablets (1,000 mg total) by mouth 2 (two) times a day with meals 180 tablet 1    omeprazole (PriLOSEC) 40 MG capsule Take 1 capsule (40 mg total) by mouth daily 90 capsule 1     No current facility-administered medications for this visit         Allergies:     No Known Allergies   Physical Exam:     /80   Pulse 93   Temp 97 8 °F (36 6 °C)   Resp 18   Ht 5' 7 25" (1 708 m)   Wt 78 4 kg (172 lb 12 8 oz)   SpO2 97%   BMI 26 86 kg/m²     Physical Exam  Constitutional: General: He is not in acute distress  Appearance: He is well-developed  HENT:      Head: Normocephalic and atraumatic  Right Ear: Hearing, tympanic membrane, ear canal and external ear normal       Left Ear: Hearing, tympanic membrane, ear canal and external ear normal       Nose: Nose normal       Mouth/Throat:      Pharynx: Uvula midline  Eyes:      General: No scleral icterus  Conjunctiva/sclera: Conjunctivae normal       Pupils: Pupils are equal, round, and reactive to light  Neck:      Musculoskeletal: Normal range of motion and neck supple  Thyroid: No thyromegaly  Vascular: No JVD  Cardiovascular:      Rate and Rhythm: Normal rate and regular rhythm  Heart sounds: Normal heart sounds  No murmur  Pulmonary:      Effort: Pulmonary effort is normal  No respiratory distress  Breath sounds: Normal breath sounds  Abdominal:      General: Bowel sounds are normal  There is no distension  Palpations: Abdomen is soft  There is no mass  Tenderness: There is no abdominal tenderness  Musculoskeletal: Normal range of motion  General: No tenderness  Lymphadenopathy:      Cervical: No cervical adenopathy  Skin:     General: Skin is warm and dry  Capillary Refill: Capillary refill takes less than 2 seconds  Findings: No rash  Neurological:      Mental Status: He is alert and oriented to person, place, and time  Cranial Nerves: No cranial nerve deficit     Psychiatric:         Behavior: Behavior normal           Josie Kaur, DO  [address]

## 2021-02-23 NOTE — PATIENT INSTRUCTIONS

## 2021-02-26 DIAGNOSIS — I10 ESSENTIAL HYPERTENSION: ICD-10-CM

## 2021-02-28 PROCEDURE — 4010F ACE/ARB THERAPY RXD/TAKEN: CPT | Performed by: PSYCHIATRY & NEUROLOGY

## 2021-02-28 RX ORDER — LISINOPRIL 10 MG/1
10 TABLET ORAL DAILY
Qty: 90 TABLET | Refills: 1 | Status: SHIPPED | OUTPATIENT
Start: 2021-02-28 | End: 2021-06-14 | Stop reason: SDUPTHER

## 2021-03-11 ENCOUNTER — OFFICE VISIT (OUTPATIENT)
Dept: SLEEP CENTER | Facility: CLINIC | Age: 51
End: 2021-03-11
Payer: COMMERCIAL

## 2021-03-11 VITALS
HEART RATE: 84 BPM | WEIGHT: 171.6 LBS | HEIGHT: 67 IN | SYSTOLIC BLOOD PRESSURE: 132 MMHG | DIASTOLIC BLOOD PRESSURE: 98 MMHG | BODY MASS INDEX: 26.93 KG/M2

## 2021-03-11 DIAGNOSIS — G47.00 FREQUENT NOCTURNAL AWAKENING: ICD-10-CM

## 2021-03-11 DIAGNOSIS — R06.83 SNORING: Primary | ICD-10-CM

## 2021-03-11 DIAGNOSIS — R06.81 WITNESSED EPISODE OF APNEA: ICD-10-CM

## 2021-03-11 DIAGNOSIS — G47.8 NON-RESTORATIVE SLEEP: ICD-10-CM

## 2021-03-11 PROCEDURE — 3080F DIAST BP >= 90 MM HG: CPT | Performed by: PSYCHIATRY & NEUROLOGY

## 2021-03-11 PROCEDURE — 99214 OFFICE O/P EST MOD 30 MIN: CPT | Performed by: PSYCHIATRY & NEUROLOGY

## 2021-03-11 PROCEDURE — 1036F TOBACCO NON-USER: CPT | Performed by: PSYCHIATRY & NEUROLOGY

## 2021-03-11 PROCEDURE — 3008F BODY MASS INDEX DOCD: CPT | Performed by: PSYCHIATRY & NEUROLOGY

## 2021-03-11 PROCEDURE — 3075F SYST BP GE 130 - 139MM HG: CPT | Performed by: PSYCHIATRY & NEUROLOGY

## 2021-03-11 NOTE — LETTER
March 11, 2021     [facility] [address]    Patient: Trinity Trevino   YOB: 1970   Date of Visit: 3/11/2021       Dear Dr Starlyn Galeazzi:    Thank you for referring Gina Mahoney to me for evaluation  Below are my notes for this consultation  If you have questions, please do not hesitate to call me  I look forward to following your patient along with you  Sincerely,        Félix Cole MD        CC: No Recipients  Félix Cole MD  3/11/2021 12:57 PM  Signed  Sleep Medicine Follow-Up Note    HPI: 52yo M with suspected TOBIN presenting for a follow up  Treatment Summary: the patient was seen last year, but he never did the ordered HST         HPI:   Today, patient presents unaccompanied  The patient stated that he hasnt been sleeping  He continues to snore and have gasping for breath  He continues to have RLS symptoms  He has trouble staying awake during the day and gets sleepy and tired  He wishes to follow through with his HST         Respiratory:  -Ongoing Snoring: yes  -Mouth Breathing: no  -Dry Mouth: yes  -Nocturnal Gasping: yes    ROS:   Genitourinary none   Cardiology none   Gastrointestinal frequent heartburn/acid reflux   Neurology need to move extremities and numbness/tingling of an extremity   Constitutional none   Integumentary none   Psychiatry none   Musculoskeletal joint pain, muscle aches, back pain, legs twitching/jerking and leg cramps   Pulmonary snoring and difficulty breathing when lying flat    ENT none   Endocrine excessive thirst   Hematological none       Sleep Pattern:  -Position: sides  -Bedtime: 10p  -Lights Out: same time  -Environment: sometimes watches TV  -Latency: 20 mins  -Awakenings: 3-4  -Wake Time: 730am  -Rise Time: same time  -Patient's estimate of Sleep Time: 3-4h    Daytime Symptoms:  -Upon Awakening: tired  -Daytime fatigue/sleepiness: tired and sleepy  -Naps: no  -Involuntary Dozing: not really  -Cognitive Symptoms: trouble with concentration   -Driving: Difficulty with sleepiness and driving:  no   -- Close calls related to sleepiness: no   -- Accidents related to sleepiness: no    Substance Use:  -Caffeine: 1 cup of coffee once in a while  -Alcohol: 2 nights a week will drink 6 drinks  -THC: no    --> DM diagnosis since last visit  --> Supplemental Oxygen Use: denies    Questionnaire:  Sitting and reading: Slight chance of dozing  Watching TV: Moderate chance of dozing  Sitting, inactive in a public place (e g  a theatre or a meeting): Moderate chance of dozing  As a passenger in a car for an hour without a break: Moderate chance of dozing  Lying down to rest in the afternoon when circumstances permit: Would never doze  Sitting and talking to someone: Moderate chance of dozing  Sitting quietly after a lunch without alcohol: Would never doze  In a car, while stopped for a few minutes in traffic: Slight chance of dozing  Total score: 10      PE:  /98   Pulse 84   Ht 5' 7 25" (1 708 m)   Wt 77 8 kg (171 lb 9 6 oz)   BMI 26 68 kg/m²     General:  In NAD  Pul: Respirations: unlaboured  MS: No atrophy  Neuro: No resting tremor  Gait normal turning & station; unremarkable overall  Psych: Socially appropriate  Pleasant  No overt dysphoria  Assessment: The patient continues to be symptomatic with snoring, gasping, observed apnea, frequent nocturnal awakening, tiredness, sleepiness during the day, and RLS  He is willing to follow through with an HST this time around  Recommendations:    1) Home study  2) Safe driving reviewed  3) Follow-up after initiation of treatment  4) Call with any questions or concerns  All questions answered for the patient, who indicated understanding and agreed with the plan       Sharyn Jerez MD  Psychiatry/ Sleep medicine

## 2021-03-11 NOTE — PROGRESS NOTES
Sleep Medicine Follow-Up Note    HPI: 54yo M with suspected TOBIN presenting for a follow up  Treatment Summary: the patient was seen last year, but he never did the ordered HST         HPI:   Today, patient presents unaccompanied  The patient stated that he hasnt been sleeping  He continues to snore and have gasping for breath  He continues to have RLS symptoms  He has trouble staying awake during the day and gets sleepy and tired  He wishes to follow through with his HST  Respiratory:  -Ongoing Snoring: yes  -Mouth Breathing: no  -Dry Mouth: yes  -Nocturnal Gasping: yes    ROS:   Genitourinary none   Cardiology none   Gastrointestinal frequent heartburn/acid reflux   Neurology need to move extremities and numbness/tingling of an extremity   Constitutional none   Integumentary none   Psychiatry none   Musculoskeletal joint pain, muscle aches, back pain, legs twitching/jerking and leg cramps   Pulmonary snoring and difficulty breathing when lying flat    ENT none   Endocrine excessive thirst   Hematological none       Sleep Pattern:  -Position: sides  -Bedtime: 10p  -Lights Out: same time  -Environment: sometimes watches TV  -Latency: 20 mins  -Awakenings: 3-4  -Wake Time: 730am  -Rise Time: same time  -Patient's estimate of Sleep Time: 3-4h    Daytime Symptoms:  -Upon Awakening: tired  -Daytime fatigue/sleepiness: tired and sleepy  -Naps: no  -Involuntary Dozing: not really  -Cognitive Symptoms: trouble with concentration   -Driving: Difficulty with sleepiness and driving:  no   -- Close calls related to sleepiness: no   -- Accidents related to sleepiness: no    Substance Use:  -Caffeine: 1 cup of coffee once in a while  -Alcohol: 2 nights a week will drink 6 drinks  -THC: no    --> DM diagnosis since last visit     --> Supplemental Oxygen Use: denies    Questionnaire:  Sitting and reading: Slight chance of dozing  Watching TV: Moderate chance of dozing  Sitting, inactive in a public place (e g  a theatre or a meeting): Moderate chance of dozing  As a passenger in a car for an hour without a break: Moderate chance of dozing  Lying down to rest in the afternoon when circumstances permit: Would never doze  Sitting and talking to someone: Moderate chance of dozing  Sitting quietly after a lunch without alcohol: Would never doze  In a car, while stopped for a few minutes in traffic: Slight chance of dozing  Total score: 10      PE:  /98   Pulse 84   Ht 5' 7 25" (1 708 m)   Wt 77 8 kg (171 lb 9 6 oz)   BMI 26 68 kg/m²     General:  In NAD  Pul: Respirations: unlaboured  MS: No atrophy  Neuro: No resting tremor  Gait normal turning & station; unremarkable overall  Psych: Socially appropriate  Pleasant  No overt dysphoria  Assessment: The patient continues to be symptomatic with snoring, gasping, observed apnea, frequent nocturnal awakening, tiredness, sleepiness during the day, and RLS  He is willing to follow through with an HST this time around  Recommendations:    1) Home study  2) Safe driving reviewed  3) Follow-up after initiation of treatment  4) Call with any questions or concerns  All questions answered for the patient, who indicated understanding and agreed with the plan       Ney Davidson MD  Psychiatry/ Sleep medicine

## 2021-03-11 NOTE — PATIENT INSTRUCTIONS
Recommendations:    1) Home study  2) Safe driving reviewed  3) Follow-up after initiation of treatment  4) Call with any questions or concerns

## 2021-04-06 DIAGNOSIS — Z23 ENCOUNTER FOR IMMUNIZATION: ICD-10-CM

## 2021-05-14 ENCOUNTER — HOSPITAL ENCOUNTER (OUTPATIENT)
Dept: SLEEP CENTER | Facility: CLINIC | Age: 51
Discharge: HOME | End: 2021-05-14
Payer: COMMERCIAL

## 2021-05-14 DIAGNOSIS — G47.00 FREQUENT NOCTURNAL AWAKENING: ICD-10-CM

## 2021-05-14 DIAGNOSIS — G47.33 OSA (OBSTRUCTIVE SLEEP APNEA): ICD-10-CM

## 2021-05-14 DIAGNOSIS — G47.8 NON-RESTORATIVE SLEEP: ICD-10-CM

## 2021-05-14 DIAGNOSIS — R06.81 WITNESSED EPISODE OF APNEA: ICD-10-CM

## 2021-05-14 DIAGNOSIS — R06.83 SNORING: ICD-10-CM

## 2021-05-14 PROCEDURE — G0399 HOME SLEEP TEST/TYPE 3 PORTA: HCPCS

## 2021-05-15 NOTE — PROGRESS NOTES
Home Sleep Study Documentation    Pre-Sleep Home Study:    Set-up and instructions performed by: Giovanny Hernandes    Technician performed demonstration for Patient: yes    Return demonstration performed by Patient: yes    Written instructions provided to Patient: yes    Patient signed consent form: yes        Post-Sleep Home Study:    Additional comments by Patient: none    Home Sleep Study Failed:no:    Failure reason: N/A    Reported or Detected: N/A    Scored by: RUBINA Martinez, RPSGT

## 2021-05-20 ENCOUNTER — TELEPHONE (OUTPATIENT)
Dept: SLEEP CENTER | Facility: CLINIC | Age: 51
End: 2021-05-20

## 2021-05-20 DIAGNOSIS — Z01.812 ENCOUNTER FOR PREPROCEDURE SCREENING LABORATORY TESTING FOR COVID-19: Primary | ICD-10-CM

## 2021-05-20 DIAGNOSIS — G47.33 OSA (OBSTRUCTIVE SLEEP APNEA): Primary | ICD-10-CM

## 2021-05-20 DIAGNOSIS — G47.34 NOCTURNAL HYPOXEMIA: ICD-10-CM

## 2021-05-20 DIAGNOSIS — Z20.822 ENCOUNTER FOR PREPROCEDURE SCREENING LABORATORY TESTING FOR COVID-19: Primary | ICD-10-CM

## 2021-05-20 PROCEDURE — 95806 SLEEP STUDY UNATT&RESP EFFT: CPT | Performed by: PSYCHIATRY & NEUROLOGY

## 2021-05-20 NOTE — TELEPHONE ENCOUNTER
----- Message from Мария Jovel MD sent at 5/20/2021 12:08 PM EDT -----  HST read  CPAP titration ordered

## 2021-05-20 NOTE — TELEPHONE ENCOUNTER
Left message for patient to call office for sleep study results  Severe TOBIN  CAT[address] [phone]  Needs to schedule CPAP study  Patient has follow up with Dr Dave Li on 6/14/21 that will need to be rescheduled

## 2021-05-21 NOTE — TELEPHONE ENCOUNTER
Spoke with patient, advised above  Scheduled titration study 6/17/2021 in [facility]  Instructions provided, patient verbalizes understanding  Discussed COVID protocol:  Patient agreeable to have COVID test on 6/7/2021, for PAP study on 6/17/2021  We ask that you limit interpersonal interactions as much as possible and observe all social distancing and masking precautions from the time of your testing to the time of your study        COVID letter sent  COVID testing order placed    Follow up with Dr Nadja Nick cancelled and rescheduled as compliance for 8/26/2021  DME set up scheduled 7/12/2021

## 2021-05-25 ENCOUNTER — TELEPHONE (OUTPATIENT)
Dept: SLEEP CENTER | Facility: CLINIC | Age: 51
End: 2021-05-25

## 2021-05-25 NOTE — TELEPHONE ENCOUNTER
5/25 Patient informed that COVID testing is to be performed 5-10 days prior to PAP study  COVID tests performed more than 10 days prior to the sleep study will not be accepted  Testing site information provided as well as letter with testing dates   [place]

## 2021-06-09 NOTE — TELEPHONE ENCOUNTER
Left message for the patient that [facility] requires proof of COVID vaccine or Negative COVID test on chart bt 10 am 6/17/2021

## 2021-06-11 LAB
ALBUMIN SERPL-MCNC: 4.5 G/DL (ref 3.6–5.1)
ALBUMIN/CREAT UR: 21 MCG/MG CREAT
ALBUMIN/GLOB SERPL: 2.3 (CALC) (ref 1–2.5)
ALP SERPL-CCNC: 51 U/L (ref 36–130)
ALT SERPL-CCNC: 46 U/L (ref 9–46)
AST SERPL-CCNC: 23 U/L (ref 10–40)
BILIRUB SERPL-MCNC: 0.7 MG/DL (ref 0.2–1.2)
BUN SERPL-MCNC: 11 MG/DL (ref 7–25)
BUN/CREAT SERPL: ABNORMAL (CALC) (ref 6–22)
CALCIUM SERPL-MCNC: 9.4 MG/DL (ref 8.6–10.3)
CHLORIDE SERPL-SCNC: 102 MMOL/L (ref 98–110)
CHOLEST SERPL-MCNC: 180 MG/DL
CHOLEST/HDLC SERPL: 4 (CALC)
CO2 SERPL-SCNC: 29 MMOL/L (ref 20–32)
CREAT SERPL-MCNC: 0.83 MG/DL (ref 0.6–1.35)
CREAT UR-MCNC: 221 MG/DL (ref 20–320)
GLOBULIN SER CALC-MCNC: 2 G/DL (CALC) (ref 1.9–3.7)
GLUCOSE SERPL-MCNC: 245 MG/DL (ref 65–99)
HBA1C MFR BLD: 8.9 % OF TOTAL HGB
HDLC SERPL-MCNC: 45 MG/DL
LDLC SERPL CALC-MCNC: 110 MG/DL (CALC)
MICROALBUMIN UR-MCNC: 4.7 MG/DL
NONHDLC SERPL-MCNC: 135 MG/DL (CALC)
POTASSIUM SERPL-SCNC: 4.2 MMOL/L (ref 3.5–5.3)
PROT SERPL-MCNC: 6.5 G/DL (ref 6.1–8.1)
SL AMB EGFR AFRICAN AMERICAN: 120 ML/MIN/1.73M2
SL AMB EGFR NON AFRICAN AMERICAN: 103 ML/MIN/1.73M2
SODIUM SERPL-SCNC: 138 MMOL/L (ref 135–146)
TRIGL SERPL-MCNC: 137 MG/DL

## 2021-06-11 PROCEDURE — 3052F HG A1C>EQUAL 8.0%<EQUAL 9.0%: CPT | Performed by: FAMILY MEDICINE

## 2021-06-11 PROCEDURE — 3061F NEG MICROALBUMINURIA REV: CPT | Performed by: FAMILY MEDICINE

## 2021-06-14 ENCOUNTER — OFFICE VISIT (OUTPATIENT)
Dept: FAMILY MEDICINE CLINIC | Facility: OTHER | Age: 51
End: 2021-06-14
Payer: COMMERCIAL

## 2021-06-14 VITALS
SYSTOLIC BLOOD PRESSURE: 134 MMHG | BODY MASS INDEX: 25.67 KG/M2 | OXYGEN SATURATION: 98 % | RESPIRATION RATE: 18 BRPM | TEMPERATURE: 97.8 F | HEART RATE: 93 BPM | DIASTOLIC BLOOD PRESSURE: 86 MMHG | WEIGHT: 169.4 LBS | HEIGHT: 68 IN

## 2021-06-14 DIAGNOSIS — E11.9 TYPE 2 DIABETES MELLITUS WITHOUT COMPLICATION, WITHOUT LONG-TERM CURRENT USE OF INSULIN (HCC): Primary | ICD-10-CM

## 2021-06-14 DIAGNOSIS — E11.69 DYSLIPIDEMIA ASSOCIATED WITH TYPE 2 DIABETES MELLITUS (HCC): ICD-10-CM

## 2021-06-14 DIAGNOSIS — I10 ESSENTIAL HYPERTENSION: ICD-10-CM

## 2021-06-14 DIAGNOSIS — Z11.59 NEED FOR HEPATITIS C SCREENING TEST: ICD-10-CM

## 2021-06-14 DIAGNOSIS — E78.5 DYSLIPIDEMIA ASSOCIATED WITH TYPE 2 DIABETES MELLITUS (HCC): ICD-10-CM

## 2021-06-14 DIAGNOSIS — K21.9 GASTROESOPHAGEAL REFLUX DISEASE: ICD-10-CM

## 2021-06-14 PROCEDURE — 3075F SYST BP GE 130 - 139MM HG: CPT | Performed by: FAMILY MEDICINE

## 2021-06-14 PROCEDURE — 3079F DIAST BP 80-89 MM HG: CPT | Performed by: FAMILY MEDICINE

## 2021-06-14 PROCEDURE — 3008F BODY MASS INDEX DOCD: CPT | Performed by: FAMILY MEDICINE

## 2021-06-14 PROCEDURE — 1036F TOBACCO NON-USER: CPT | Performed by: FAMILY MEDICINE

## 2021-06-14 PROCEDURE — 4010F ACE/ARB THERAPY RXD/TAKEN: CPT | Performed by: FAMILY MEDICINE

## 2021-06-14 PROCEDURE — 99214 OFFICE O/P EST MOD 30 MIN: CPT | Performed by: FAMILY MEDICINE

## 2021-06-14 RX ORDER — LISINOPRIL 10 MG/1
10 TABLET ORAL DAILY
Qty: 90 TABLET | Refills: 1 | Status: SHIPPED | OUTPATIENT
Start: 2021-06-14 | End: 2021-12-30 | Stop reason: SDUPTHER

## 2021-06-14 RX ORDER — OMEPRAZOLE 40 MG/1
40 CAPSULE, DELAYED RELEASE ORAL DAILY
Qty: 90 CAPSULE | Refills: 1 | Status: SHIPPED | OUTPATIENT
Start: 2021-06-14 | End: 2021-11-02 | Stop reason: SDUPTHER

## 2021-06-14 NOTE — PROGRESS NOTES
Assessment/Plan:    No problem-specific Assessment & Plan notes found for this encounter  Diagnoses and all orders for this visit:    Type 2 diabetes mellitus without complication, without long-term current use of insulin ([facility])  Comments:  A1c 8 9% (slight improvement)  Reinforced the importance of metformin 1,000mg BID and healthy diet/exercise  Recheck labs in 3 mo  Meet with DM education  Orders:  -     Comprehensive metabolic panel; Future  -     Hemoglobin A1C; Future  -     Lipid panel; Future  -     Ambulatory referral to Diabetic Education; Future    Essential hypertension  Comments:  BP Goal <140/90  Continue Lisinopril 10 mg daily  Orders:  -     lisinopril (ZESTRIL) 10 mg tablet; Take 1 tablet (10 mg total) by mouth daily  -     Comprehensive metabolic panel; Future  -     Lipid panel; Future    Dyslipidemia associated with type 2 diabetes mellitus ([facility] [address] )  Comments:  LDL goal <100  ASCVD Risk 7 2% (moderate)  Reviewed dietary changes to meet goal  Consider statin in near future    Gastroesophageal reflux disease  Comments:  Stable, refill granted  Orders:  -     omeprazole (PriLOSEC) 40 MG capsule; Take 1 capsule (40 mg total) by mouth daily    Need for hepatitis C screening test  -     Hepatitis C antibody; Future        Return in about 3 months (around 9/14/2021) for Recheck DM (labs due prior)  The patient indicates understanding of these issues and agrees with the plan  Subjective:      Patient ID: Raghu Lake is a 54 y o  male  Diabetes  He presents for his follow-up diabetic visit  He has type 2 diabetes mellitus  His disease course has been improving  There are no hypoglycemic associated symptoms  Pertinent negatives for hypoglycemia include no dizziness, headaches, nervousness/anxiousness or sweats  There are no diabetic associated symptoms  Pertinent negatives for diabetes include no blurred vision, no chest pain and no fatigue  There are no hypoglycemic complications  There are no diabetic complications  Pertinent negatives for diabetic complications include no CVA, PVD or retinopathy  Risk factors for coronary artery disease include diabetes mellitus, family history, hypertension and male sex  Current diabetic treatment includes oral agent (monotherapy)  He is compliant with treatment some of the time (Only taking metformin QD, did not know dosing was BID)  His weight is stable  He is following a generally healthy diet  When asked about meal planning, he reported none  He has not had a previous visit with a dietitian  He participates in exercise intermittently  There is no change in his home blood glucose trend  His overall blood glucose range is >200 mg/dl  An ACE inhibitor/angiotensin II receptor blocker is being taken  He does not see a podiatrist Eye exam is current  Hypertension  This is a new problem  The current episode started more than 1 month ago  The problem has been gradually improving since onset  The problem is controlled  Pertinent negatives include no anxiety, blurred vision, chest pain, headaches, malaise/fatigue, neck pain, orthopnea, palpitations, peripheral edema, PND, shortness of breath or sweats  There are no associated agents to hypertension  Risk factors for coronary artery disease include diabetes mellitus, family history and male gender  Past treatments include ACE inhibitors  The current treatment provides moderate improvement  Compliance problems include diet and psychosocial issues  There is no history of angina, kidney disease, CAD/MI, CVA, heart failure, left ventricular hypertrophy, PVD or retinopathy  There is no history of chronic renal disease, a hypertension causing med or a thyroid problem         The following portions of the patient's history were reviewed and updated as appropriate: allergies, current medications, past family history, past medical history, past social history, past surgical history and problem list       Current Outpatient Medications:     aspirin (ECOTRIN LOW STRENGTH) 81 mg EC tablet, Take 1 tablet (81 mg total) by mouth daily, Disp: 30 tablet, Rfl:     glucose blood test strip, Test glucose daily or as directed by physician, Disp: 100 each, Rfl: 11    glucose monitoring kit (FREESTYLE) monitoring kit, Test glucose daily or as directed by physician, Disp: 1 each, Rfl: 1    Lancets (FREESTYLE) lancets, Test glucose daily or as directed by physician, Disp: 100 each, Rfl: 11    lisinopril (ZESTRIL) 10 mg tablet, Take 1 tablet (10 mg total) by mouth daily, Disp: 90 tablet, Rfl: 1    metFORMIN (GLUCOPHAGE) 500 mg tablet, Take 2 tablets (1,000 mg total) by mouth 2 (two) times a day with meals, Disp: 180 tablet, Rfl: 1    omeprazole (PriLOSEC) 40 MG capsule, Take 1 capsule (40 mg total) by mouth daily, Disp: 90 capsule, Rfl: 1      Review of Systems   Constitutional: Negative for activity change, fatigue, fever and malaise/fatigue  HENT: Negative for congestion, ear pain, sinus pain and sore throat  Eyes: Negative for blurred vision, pain, itching and visual disturbance  Respiratory: Negative for cough and shortness of breath  Cardiovascular: Negative for chest pain, palpitations, orthopnea, leg swelling and PND  Gastrointestinal: Negative for abdominal pain, constipation, diarrhea, nausea and vomiting  Endocrine: Negative for cold intolerance and heat intolerance  Genitourinary: Negative for dysuria  Musculoskeletal: Negative for myalgias and neck pain  Skin: Negative for color change and rash  Neurological: Negative for dizziness, syncope and headaches  Hematological: Negative for adenopathy  Psychiatric/Behavioral: Negative for dysphoric mood  The patient is not nervous/anxious            Objective:      /86   Pulse 93   Temp 97 8 °F (36 6 °C)   Resp 18   Ht 5' 7 5" (1 715 m)   Wt 76 8 kg (169 lb 6 4 oz)   SpO2 98%   BMI 26 14 kg/m²          Physical Exam  Vitals and nursing note reviewed  Constitutional:       General: He is not in acute distress  Appearance: Normal appearance  He is well-developed  He is not ill-appearing  HENT:      Head: Normocephalic and atraumatic  Eyes:      General: No scleral icterus  Conjunctiva/sclera: Conjunctivae normal       Pupils: Pupils are equal, round, and reactive to light  Cardiovascular:      Rate and Rhythm: Normal rate and regular rhythm  Heart sounds: Normal heart sounds  No murmur heard  Pulmonary:      Effort: Pulmonary effort is normal  No respiratory distress  Breath sounds: Normal breath sounds  Abdominal:      General: Bowel sounds are normal  There is no distension  Palpations: Abdomen is soft  Tenderness: There is no abdominal tenderness  Musculoskeletal:         General: Normal range of motion  Cervical back: Normal range of motion and neck supple  Right lower leg: No edema  Left lower leg: No edema  Lymphadenopathy:      Cervical: No cervical adenopathy  Skin:     General: Skin is warm and dry  Coloration: Skin is not jaundiced  Findings: No rash  Neurological:      General: No focal deficit present  Mental Status: He is alert and oriented to person, place, and time  Cranial Nerves: No cranial nerve deficit        Gait: Gait normal    Psychiatric:         Mood and Affect: Mood normal          Behavior: Behavior normal

## 2021-06-15 PROBLEM — E11.69 DYSLIPIDEMIA ASSOCIATED WITH TYPE 2 DIABETES MELLITUS (HCC): Status: ACTIVE | Noted: 2018-08-13

## 2021-06-15 PROBLEM — E78.5 DYSLIPIDEMIA ASSOCIATED WITH TYPE 2 DIABETES MELLITUS (HCC): Status: ACTIVE | Noted: 2018-08-13

## 2021-06-15 NOTE — TELEPHONE ENCOUNTER
Called patient  He states he has received COVID vaccine and is fully vaccinated  Advised we need copy of COVID card on chart by 10am on 6/17/21 or sleep study will be cancelled  Patient will email copy of COVID vaccine card  Sent email to patient with office email address

## 2021-07-07 ENCOUNTER — TELEPHONE (OUTPATIENT)
Dept: SLEEP CENTER | Facility: CLINIC | Age: 51
End: 2021-07-07

## 2021-07-07 DIAGNOSIS — G47.33 OSA (OBSTRUCTIVE SLEEP APNEA): Primary | ICD-10-CM

## 2021-07-07 DIAGNOSIS — G47.34 NOCTURNAL HYPOXEMIA: ICD-10-CM

## 2021-07-07 NOTE — TELEPHONE ENCOUNTER
We had to cancel the patient's CPAP study due to the recall  Please advise how you would like to proceed  Would you like the patient to wait to have CPAP study, or will you be ordering auto PAP (pt already has appt 7/12)?   Thank you

## 2021-07-08 NOTE — TELEPHONE ENCOUNTER
Called patient and advised since [facility] Terrell is unable to complete titration study due to [facility] recall, Dr Lee Turcios is recommending APAP with [place]  Patient already had DME set up scheduled 7/12/21 in [place]  Appointment information emailed to [facility]  Compliance follow up scheduled 8/26/21  Order for overnight pulse oximetry handed to Angel voss in office

## 2021-07-12 ENCOUNTER — TELEPHONE (OUTPATIENT)
Dept: SLEEP CENTER | Facility: CLINIC | Age: 51
End: 2021-07-12

## 2021-07-16 DIAGNOSIS — E11.9 TYPE 2 DIABETES MELLITUS WITHOUT COMPLICATION, WITHOUT LONG-TERM CURRENT USE OF INSULIN (HCC): ICD-10-CM

## 2021-08-27 ENCOUNTER — TELEMEDICINE (OUTPATIENT)
Dept: FAMILY MEDICINE CLINIC | Facility: CLINIC | Age: 51
End: 2021-08-27
Payer: COMMERCIAL

## 2021-08-27 DIAGNOSIS — R09.81 NASAL CONGESTION: Primary | ICD-10-CM

## 2021-08-27 PROCEDURE — 99213 OFFICE O/P EST LOW 20 MIN: CPT | Performed by: FAMILY MEDICINE

## 2021-08-27 NOTE — PROGRESS NOTES
Virtual Regular Visit    Verification of patient location:    Patient is located in the following state in which I hold an active license [place]      Assessment/Plan:    Problem List Items Addressed This Visit     None      Visit Diagnoses     Nasal congestion    -  Primary        Nasal congestion with cough for 5 days, without associated fevers, chills ,myalgias, arthralgias, or GI symptoms  Recently vaccinated for COVID in May and had COVID in march  At this time I have a low suspicion for COVID; most likely viral sinusitis with cough secondary to postnasal drip  -advised he use flonase and demonstrated proper use  -can also use guafenisin and/or tessalon to help alleviate symptoms of cough   -likely will improve over the next several days, but recommended he call for evaluation of develops worsening respiratory symptoms or fevers  -follow up on monday        Tobacco and Toxic Substance Assessment and Intervention:     Tobacco use screening performed    Alcohol and drug use screening performed           Reason for visit is No chief complaint on file  Encounter provider Keiry Dyer DO    Provider located at [address] [place] [id number] [phone]      Recent Visits  Date Type Provider Dept   08/27/21 Telemedicine Keiry Dyer DO [address] recent visits within past 7 days and meeting all other requirements  Future Appointments  No visits were found meeting these conditions  Showing future appointments within next 150 days and meeting all other requirements       The patient was identified by name and date of birth  Jessicaniharika Rosales was informed that this is a telemedicine visit and that the visit is being conducted through [facility] and patient was informed that this is a secure, HIPAA-compliant platform  He agrees to proceed     My office door was closed  No one else was in the room    He acknowledged consent and understanding of privacy and security of the video platform  The patient has agreed to participate and understands they can discontinue the visit at any time  Patient is aware this is a billable service  Subjective  Chuck Quiles is a 54 y o  male with congestion   Symptoms started 5 days ago on 8/23/21 with nasal congestion which felt like he was going to have a sinus infection  Reports he gets a sinus infection annually  He also does have some chest congestion  Reports that nasal congestion went away a day or two ago but chest congestion has persisted  He does report cough is productive  Feels like chest congestion symptoms are unchanged  Reports has a "heavy" feeling with deep inspiration  Denies any SOB with exertion  Denies fevers, chills, myalgias, nausea, vomiting, diarrhea, loss of smell or taste  He is eating and drinking okay  He has a h/o COVID infection in February 2021, for which he had sinus headache and sore throat that lasted only 3 days  He is vaccinated for COVID - he got this in May 2021  Denies any sick contacts  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  Lives with 3 kids and his wife, all of whom feel well  He denies smoking - quit 4 years ago  Denies recent antibiotic use or hospitalization       HPI     Past Medical History:   Diagnosis Date    History of illicit drug use     cocaine noted in social history       Past Surgical History:   Procedure Laterality Date    CHOLECYSTECTOMY      CHOLECYSTECTOMY      COLONOSCOPY W/ BIOPSIES  11/2011    polyps       Current Outpatient Medications   Medication Sig Dispense Refill    aspirin (ECOTRIN LOW STRENGTH) 81 mg EC tablet Take 1 tablet (81 mg total) by mouth daily 30 tablet     glucose blood test strip Test glucose daily or as directed by physician 100 each 11    glucose monitoring kit (FREESTYLE) monitoring kit Test glucose daily or as directed by physician 1 each 1    Lancets (FREESTYLE) lancets Test glucose daily or as directed by physician 100 each 11    lisinopril (ZESTRIL) 10 mg tablet Take 1 tablet (10 mg total) by mouth daily 90 tablet 1    metFORMIN (GLUCOPHAGE) 500 mg tablet Take 2 tablets (1,000 mg total) by mouth 2 (two) times a day with meals 180 tablet 1    omeprazole (PriLOSEC) 40 MG capsule Take 1 capsule (40 mg total) by mouth daily 90 capsule 1     No current facility-administered medications for this visit  No Known Allergies    Review of Systems   Constitutional: Negative for chills and fever  HENT: Positive for congestion, postnasal drip, rhinorrhea and sinus pressure  Negative for sinus pain  Respiratory: Positive for cough  Negative for shortness of breath  Cardiovascular: Negative for chest pain  Gastrointestinal: Negative for abdominal pain, diarrhea, nausea and vomiting  Musculoskeletal: Negative for arthralgias and myalgias  Neurological: Positive for headaches  Video Exam    There were no vitals filed for this visit  Physical Exam     I spent 20 minutes directly with the patient during this visit    VIRTUAL VISIT [facility] verbally agrees to participate in [employer]  Pt is aware that [employer] could be limited without vital signs or the ability to perform a full hands-on physical exam  Luz Maria Mcpherson understands he or the provider may request at any time to terminate the video visit and request the patient to seek care or treatment in person

## 2021-10-18 ENCOUNTER — TELEMEDICINE (OUTPATIENT)
Dept: FAMILY MEDICINE CLINIC | Facility: OTHER | Age: 51
End: 2021-10-18
Payer: COMMERCIAL

## 2021-10-18 DIAGNOSIS — R09.89 CHEST CONGESTION: ICD-10-CM

## 2021-10-18 DIAGNOSIS — R05.9 COUGH: Primary | ICD-10-CM

## 2021-10-18 PROCEDURE — 99213 OFFICE O/P EST LOW 20 MIN: CPT | Performed by: FAMILY MEDICINE

## 2021-10-18 RX ORDER — BENZONATATE 100 MG/1
100 CAPSULE ORAL 3 TIMES DAILY PRN
Qty: 20 CAPSULE | Refills: 0 | Status: SHIPPED | OUTPATIENT
Start: 2021-10-18 | End: 2021-12-30 | Stop reason: SDUPTHER

## 2021-10-21 ENCOUNTER — TELEMEDICINE (OUTPATIENT)
Dept: FAMILY MEDICINE CLINIC | Facility: CLINIC | Age: 51
End: 2021-10-21
Payer: COMMERCIAL

## 2021-10-21 DIAGNOSIS — B96.89 ACUTE BACTERIAL RHINOSINUSITIS: ICD-10-CM

## 2021-10-21 DIAGNOSIS — J01.90 ACUTE BACTERIAL RHINOSINUSITIS: ICD-10-CM

## 2021-10-21 DIAGNOSIS — R09.81 SINUS CONGESTION: Primary | ICD-10-CM

## 2021-10-21 PROCEDURE — 99213 OFFICE O/P EST LOW 20 MIN: CPT | Performed by: FAMILY MEDICINE

## 2021-10-21 RX ORDER — FLUTICASONE PROPIONATE 50 MCG
1 SPRAY, SUSPENSION (ML) NASAL DAILY
Qty: 9.9 ML | Refills: 0 | Status: SHIPPED | OUTPATIENT
Start: 2021-10-21

## 2021-10-21 RX ORDER — AMOXICILLIN 875 MG/1
875 TABLET, COATED ORAL 2 TIMES DAILY
Qty: 14 TABLET | Refills: 0 | Status: SHIPPED | OUTPATIENT
Start: 2021-10-21 | End: 2021-10-28

## 2021-11-02 DIAGNOSIS — K21.9 GASTROESOPHAGEAL REFLUX DISEASE: ICD-10-CM

## 2021-11-02 RX ORDER — OMEPRAZOLE 40 MG/1
40 CAPSULE, DELAYED RELEASE ORAL DAILY
Qty: 90 CAPSULE | Refills: 1 | Status: SHIPPED | OUTPATIENT
Start: 2021-11-02 | End: 2021-12-30 | Stop reason: SDUPTHER

## 2021-11-29 DIAGNOSIS — E11.9 TYPE 2 DIABETES MELLITUS WITHOUT COMPLICATION, WITHOUT LONG-TERM CURRENT USE OF INSULIN (HCC): ICD-10-CM

## 2021-12-15 ENCOUNTER — LAB (OUTPATIENT)
Dept: LAB | Facility: CLINIC | Age: 51
End: 2021-12-15
Payer: COMMERCIAL

## 2021-12-15 DIAGNOSIS — I10 ESSENTIAL HYPERTENSION: ICD-10-CM

## 2021-12-15 DIAGNOSIS — E11.9 TYPE 2 DIABETES MELLITUS WITHOUT COMPLICATION, WITHOUT LONG-TERM CURRENT USE OF INSULIN (HCC): ICD-10-CM

## 2021-12-15 DIAGNOSIS — Z11.59 NEED FOR HEPATITIS C SCREENING TEST: ICD-10-CM

## 2021-12-15 LAB
ALBUMIN SERPL BCP-MCNC: 4 G/DL (ref 3.5–5)
ALP SERPL-CCNC: 54 U/L (ref 46–116)
ALT SERPL W P-5'-P-CCNC: 92 U/L (ref 12–78)
ANION GAP SERPL CALCULATED.3IONS-SCNC: 7 MMOL/L (ref 4–13)
AST SERPL W P-5'-P-CCNC: 36 U/L (ref 5–45)
BILIRUB SERPL-MCNC: 0.68 MG/DL (ref 0.2–1)
BUN SERPL-MCNC: 15 MG/DL (ref 5–25)
CALCIUM SERPL-MCNC: 9.3 MG/DL (ref 8.3–10.1)
CHLORIDE SERPL-SCNC: 103 MMOL/L (ref 100–108)
CHOLEST SERPL-MCNC: 162 MG/DL
CO2 SERPL-SCNC: 29 MMOL/L (ref 21–32)
CREAT SERPL-MCNC: 0.92 MG/DL (ref 0.6–1.3)
EST. AVERAGE GLUCOSE BLD GHB EST-MCNC: 174 MG/DL
GFR SERPL CREATININE-BSD FRML MDRD: 97 ML/MIN/1.73SQ M
GLUCOSE P FAST SERPL-MCNC: 219 MG/DL (ref 65–99)
HBA1C MFR BLD: 7.7 %
HCV AB SER QL: NORMAL
HDLC SERPL-MCNC: 34 MG/DL
LDLC SERPL CALC-MCNC: 88 MG/DL (ref 0–100)
NONHDLC SERPL-MCNC: 128 MG/DL
POTASSIUM SERPL-SCNC: 4.8 MMOL/L (ref 3.5–5.3)
PROT SERPL-MCNC: 7.3 G/DL (ref 6.4–8.2)
SODIUM SERPL-SCNC: 139 MMOL/L (ref 136–145)
TRIGL SERPL-MCNC: 201 MG/DL

## 2021-12-15 PROCEDURE — 80053 COMPREHEN METABOLIC PANEL: CPT

## 2021-12-15 PROCEDURE — 83036 HEMOGLOBIN GLYCOSYLATED A1C: CPT

## 2021-12-15 PROCEDURE — 36415 COLL VENOUS BLD VENIPUNCTURE: CPT

## 2021-12-15 PROCEDURE — 3051F HG A1C>EQUAL 7.0%<8.0%: CPT | Performed by: FAMILY MEDICINE

## 2021-12-15 PROCEDURE — 80061 LIPID PANEL: CPT

## 2021-12-15 PROCEDURE — 86803 HEPATITIS C AB TEST: CPT

## 2021-12-17 ENCOUNTER — OFFICE VISIT (OUTPATIENT)
Dept: FAMILY MEDICINE CLINIC | Facility: OTHER | Age: 51
End: 2021-12-17
Payer: COMMERCIAL

## 2021-12-17 VITALS
SYSTOLIC BLOOD PRESSURE: 130 MMHG | OXYGEN SATURATION: 98 % | BODY MASS INDEX: 25.73 KG/M2 | DIASTOLIC BLOOD PRESSURE: 94 MMHG | WEIGHT: 169.8 LBS | HEIGHT: 68 IN | TEMPERATURE: 97.8 F | HEART RATE: 77 BPM | RESPIRATION RATE: 18 BRPM

## 2021-12-17 DIAGNOSIS — E78.5 DYSLIPIDEMIA ASSOCIATED WITH TYPE 2 DIABETES MELLITUS (HCC): ICD-10-CM

## 2021-12-17 DIAGNOSIS — E11.69 DYSLIPIDEMIA ASSOCIATED WITH TYPE 2 DIABETES MELLITUS (HCC): ICD-10-CM

## 2021-12-17 DIAGNOSIS — E78.1 ESSENTIAL HYPERTRIGLYCERIDEMIA: ICD-10-CM

## 2021-12-17 DIAGNOSIS — I10 ESSENTIAL HYPERTENSION: ICD-10-CM

## 2021-12-17 DIAGNOSIS — E11.9 TYPE 2 DIABETES MELLITUS WITHOUT COMPLICATION, WITHOUT LONG-TERM CURRENT USE OF INSULIN (HCC): Primary | ICD-10-CM

## 2021-12-17 PROCEDURE — 3075F SYST BP GE 130 - 139MM HG: CPT | Performed by: FAMILY MEDICINE

## 2021-12-17 PROCEDURE — 99214 OFFICE O/P EST MOD 30 MIN: CPT | Performed by: FAMILY MEDICINE

## 2021-12-17 PROCEDURE — 1036F TOBACCO NON-USER: CPT | Performed by: FAMILY MEDICINE

## 2021-12-17 PROCEDURE — 3008F BODY MASS INDEX DOCD: CPT | Performed by: FAMILY MEDICINE

## 2021-12-17 PROCEDURE — 3080F DIAST BP >= 90 MM HG: CPT | Performed by: FAMILY MEDICINE

## 2021-12-17 PROCEDURE — 3725F SCREEN DEPRESSION PERFORMED: CPT | Performed by: FAMILY MEDICINE

## 2021-12-30 DIAGNOSIS — I10 ESSENTIAL HYPERTENSION: ICD-10-CM

## 2021-12-30 DIAGNOSIS — K21.9 GASTROESOPHAGEAL REFLUX DISEASE: ICD-10-CM

## 2021-12-30 DIAGNOSIS — R09.89 CHEST CONGESTION: ICD-10-CM

## 2021-12-30 DIAGNOSIS — E11.9 TYPE 2 DIABETES MELLITUS WITHOUT COMPLICATION, WITHOUT LONG-TERM CURRENT USE OF INSULIN (HCC): ICD-10-CM

## 2021-12-30 DIAGNOSIS — R05.9 COUGH: ICD-10-CM

## 2021-12-30 PROCEDURE — 4010F ACE/ARB THERAPY RXD/TAKEN: CPT | Performed by: FAMILY MEDICINE

## 2021-12-30 RX ORDER — OMEPRAZOLE 40 MG/1
40 CAPSULE, DELAYED RELEASE ORAL DAILY
Qty: 90 CAPSULE | Refills: 1 | Status: SHIPPED | OUTPATIENT
Start: 2021-12-30 | End: 2022-04-14 | Stop reason: SDUPTHER

## 2021-12-30 RX ORDER — BENZONATATE 100 MG/1
100 CAPSULE ORAL 3 TIMES DAILY PRN
Qty: 20 CAPSULE | Refills: 0 | Status: SHIPPED | OUTPATIENT
Start: 2021-12-30

## 2021-12-30 RX ORDER — LISINOPRIL 10 MG/1
10 TABLET ORAL DAILY
Qty: 90 TABLET | Refills: 1 | Status: SHIPPED | OUTPATIENT
Start: 2021-12-30 | End: 2022-04-14 | Stop reason: SDUPTHER

## 2022-01-03 ENCOUNTER — TELEPHONE (OUTPATIENT)
Dept: FAMILY MEDICINE CLINIC | Facility: OTHER | Age: 52
End: 2022-01-03

## 2022-01-03 DIAGNOSIS — Z20.822 ENCOUNTER FOR SCREENING LABORATORY TESTING FOR COVID-19 VIRUS: Primary | ICD-10-CM

## 2022-01-03 NOTE — TELEPHONE ENCOUNTER
The patient has had coughing, achiness and congestion since last Thursday  Asking to get covid tested because of being around older family members    Please send orders for a covid swab  Thank you

## 2022-04-13 ENCOUNTER — LAB (OUTPATIENT)
Dept: LAB | Facility: CLINIC | Age: 52
End: 2022-04-13
Payer: COMMERCIAL

## 2022-04-13 DIAGNOSIS — E78.5 DYSLIPIDEMIA ASSOCIATED WITH TYPE 2 DIABETES MELLITUS (HCC): ICD-10-CM

## 2022-04-13 DIAGNOSIS — E11.69 DYSLIPIDEMIA ASSOCIATED WITH TYPE 2 DIABETES MELLITUS (HCC): ICD-10-CM

## 2022-04-13 DIAGNOSIS — E78.1 ESSENTIAL HYPERTRIGLYCERIDEMIA: ICD-10-CM

## 2022-04-13 DIAGNOSIS — E11.9 TYPE 2 DIABETES MELLITUS WITHOUT COMPLICATION, WITHOUT LONG-TERM CURRENT USE OF INSULIN (HCC): ICD-10-CM

## 2022-04-13 DIAGNOSIS — I10 ESSENTIAL HYPERTENSION: ICD-10-CM

## 2022-04-13 LAB
ALBUMIN SERPL BCP-MCNC: 3.9 G/DL (ref 3.5–5)
ALP SERPL-CCNC: 54 U/L (ref 46–116)
ALT SERPL W P-5'-P-CCNC: 59 U/L (ref 12–78)
ANION GAP SERPL CALCULATED.3IONS-SCNC: 10 MMOL/L (ref 4–13)
AST SERPL W P-5'-P-CCNC: 26 U/L (ref 5–45)
BILIRUB SERPL-MCNC: 0.7 MG/DL (ref 0.2–1)
BUN SERPL-MCNC: 12 MG/DL (ref 5–25)
CALCIUM SERPL-MCNC: 9.1 MG/DL (ref 8.3–10.1)
CHLORIDE SERPL-SCNC: 105 MMOL/L (ref 100–108)
CHOLEST SERPL-MCNC: 160 MG/DL
CO2 SERPL-SCNC: 31 MMOL/L (ref 21–32)
CREAT SERPL-MCNC: 0.83 MG/DL (ref 0.6–1.3)
CREAT UR-MCNC: 292 MG/DL
EST. AVERAGE GLUCOSE BLD GHB EST-MCNC: 180 MG/DL
GFR SERPL CREATININE-BSD FRML MDRD: 102 ML/MIN/1.73SQ M
GLUCOSE P FAST SERPL-MCNC: 178 MG/DL (ref 65–99)
HBA1C MFR BLD: 7.9 %
HDLC SERPL-MCNC: 38 MG/DL
LDLC SERPL CALC-MCNC: 90 MG/DL (ref 0–100)
MICROALBUMIN UR-MCNC: 57.4 MG/L (ref 0–20)
MICROALBUMIN/CREAT 24H UR: 20 MG/G CREATININE (ref 0–30)
NONHDLC SERPL-MCNC: 122 MG/DL
POTASSIUM SERPL-SCNC: 3.8 MMOL/L (ref 3.5–5.3)
PROT SERPL-MCNC: 7 G/DL (ref 6.4–8.2)
SODIUM SERPL-SCNC: 146 MMOL/L (ref 136–145)
TRIGL SERPL-MCNC: 160 MG/DL

## 2022-04-13 PROCEDURE — 80061 LIPID PANEL: CPT

## 2022-04-13 PROCEDURE — 82570 ASSAY OF URINE CREATININE: CPT

## 2022-04-13 PROCEDURE — 80053 COMPREHEN METABOLIC PANEL: CPT

## 2022-04-13 PROCEDURE — 3060F POS MICROALBUMINURIA REV: CPT | Performed by: FAMILY MEDICINE

## 2022-04-13 PROCEDURE — 82043 UR ALBUMIN QUANTITATIVE: CPT

## 2022-04-13 PROCEDURE — 36415 COLL VENOUS BLD VENIPUNCTURE: CPT

## 2022-04-13 PROCEDURE — 83036 HEMOGLOBIN GLYCOSYLATED A1C: CPT

## 2022-04-13 PROCEDURE — 3051F HG A1C>EQUAL 7.0%<8.0%: CPT | Performed by: FAMILY MEDICINE

## 2022-04-14 ENCOUNTER — OFFICE VISIT (OUTPATIENT)
Dept: FAMILY MEDICINE CLINIC | Facility: OTHER | Age: 52
End: 2022-04-14
Payer: COMMERCIAL

## 2022-04-14 VITALS
WEIGHT: 161 LBS | RESPIRATION RATE: 18 BRPM | HEIGHT: 68 IN | HEART RATE: 99 BPM | TEMPERATURE: 97.8 F | DIASTOLIC BLOOD PRESSURE: 82 MMHG | OXYGEN SATURATION: 98 % | BODY MASS INDEX: 24.4 KG/M2 | SYSTOLIC BLOOD PRESSURE: 136 MMHG

## 2022-04-14 DIAGNOSIS — K21.9 GASTROESOPHAGEAL REFLUX DISEASE: ICD-10-CM

## 2022-04-14 DIAGNOSIS — M25.50 POLYARTHRALGIA: ICD-10-CM

## 2022-04-14 DIAGNOSIS — E11.9 TYPE 2 DIABETES MELLITUS WITHOUT COMPLICATION, WITHOUT LONG-TERM CURRENT USE OF INSULIN (HCC): Primary | ICD-10-CM

## 2022-04-14 DIAGNOSIS — E78.5 DYSLIPIDEMIA ASSOCIATED WITH TYPE 2 DIABETES MELLITUS (HCC): ICD-10-CM

## 2022-04-14 DIAGNOSIS — K22.70 BARRETT'S ESOPHAGUS WITHOUT DYSPLASIA: ICD-10-CM

## 2022-04-14 DIAGNOSIS — E11.69 DYSLIPIDEMIA ASSOCIATED WITH TYPE 2 DIABETES MELLITUS (HCC): ICD-10-CM

## 2022-04-14 DIAGNOSIS — Z12.11 SCREEN FOR COLON CANCER: ICD-10-CM

## 2022-04-14 DIAGNOSIS — I10 ESSENTIAL HYPERTENSION: ICD-10-CM

## 2022-04-14 PROCEDURE — 1036F TOBACCO NON-USER: CPT | Performed by: FAMILY MEDICINE

## 2022-04-14 PROCEDURE — 3075F SYST BP GE 130 - 139MM HG: CPT | Performed by: FAMILY MEDICINE

## 2022-04-14 PROCEDURE — 4010F ACE/ARB THERAPY RXD/TAKEN: CPT | Performed by: FAMILY MEDICINE

## 2022-04-14 PROCEDURE — 3008F BODY MASS INDEX DOCD: CPT | Performed by: FAMILY MEDICINE

## 2022-04-14 PROCEDURE — 99214 OFFICE O/P EST MOD 30 MIN: CPT | Performed by: FAMILY MEDICINE

## 2022-04-14 PROCEDURE — 3079F DIAST BP 80-89 MM HG: CPT | Performed by: FAMILY MEDICINE

## 2022-04-14 RX ORDER — LISINOPRIL 10 MG/1
10 TABLET ORAL DAILY
Qty: 90 TABLET | Refills: 1 | Status: SHIPPED | OUTPATIENT
Start: 2022-04-14

## 2022-04-14 RX ORDER — OMEPRAZOLE 40 MG/1
40 CAPSULE, DELAYED RELEASE ORAL DAILY
Qty: 90 CAPSULE | Refills: 1 | Status: SHIPPED | OUTPATIENT
Start: 2022-04-14

## 2022-04-14 NOTE — PROGRESS NOTES
Assessment/Plan:    No problem-specific Assessment & Plan notes found for this encounter  Diagnoses and all orders for this visit:    Type 2 diabetes mellitus without complication, without long-term current use of insulin ([facility])  Comments:  A1c 7 9% (goal 6-7)  Continue BID metformin  Add Jardiance  Recheck 3 months    Orders:  -     metFORMIN (GLUCOPHAGE) 500 mg tablet; Take 2 tablets (1,000 mg total) by mouth 2 (two) times a day with meals  -     Empagliflozin 10 MG TABS; Take 1 tablet (10 mg total) by mouth every morning  -     Comprehensive metabolic panel; Future  -     Hemoglobin A1C; Future  -     Lipid panel; Future    Dyslipidemia associated with type 2 diabetes mellitus ([facility] [address] )  Comments:  LDL goal <100  ASCVD Risk 8 3%  Risk reduction discussed  Reeval in 3 mo and possibly consider statin at that time  Orders:  -     Comprehensive metabolic panel; Future  -     Hemoglobin A1C; Future  -     Lipid panel; Future    Essential hypertension  Comments:  BP Goal <140/90  Continue Lisinopril 10 mg daily  Orders:  -     lisinopril (ZESTRIL) 10 mg tablet; Take 1 tablet (10 mg total) by mouth daily  -     Comprehensive metabolic panel; Future  -     Hemoglobin A1C; Future  -     Lipid panel; Future    Carter's esophagus without dysplasia  Gastroesophageal reflux disease  Comments:  Stable, refill granted  Orders:  -     omeprazole (PriLOSEC) 40 MG capsule; Take 1 capsule (40 mg total) by mouth daily  -     Ambulatory referral to Gastroenterology; Future    Screen for colon cancer  -     Ambulatory referral to Gastroenterology; Future    Polyarthralgia  Comments:  Etiology unclear  Dad currently undergoing Rheum w/u  Will begin with Lyme testing and reevaluate in 2-3 months  Orders:  -     Lyme Antibody Profile with reflex to WB; Future        Return in about 14 weeks (around 7/21/2022) for Annual physical, Recheck DM (labs due prior)      The patient indicates understanding of these issues and agrees with the plan         Subjective:      Patient ID: Ger Birmingham is a 50 y o  male  Diabetes  He presents for his follow-up diabetic visit  He has type 2 diabetes mellitus  His disease course has been stable  There are no hypoglycemic associated symptoms  Pertinent negatives for hypoglycemia include no dizziness, headaches, nervousness/anxiousness or sweats  There are no diabetic associated symptoms  Pertinent negatives for diabetes include no blurred vision, no chest pain and no fatigue  There are no hypoglycemic complications  There are no diabetic complications  Pertinent negatives for diabetic complications include no CVA, PVD or retinopathy  Risk factors for coronary artery disease include diabetes mellitus, dyslipidemia, family history, hypertension and male sex  Current diabetic treatment includes oral agent (monotherapy)  He is compliant with treatment most of the time (occasionally misses evening dose of metformin)  His weight is stable  He is following a generally healthy diet  Meal planning includes avoidance of concentrated sweets  He has had a previous visit with a dietitian  He participates in exercise three times a week  An ACE inhibitor/angiotensin II receptor blocker is being taken  He does not see a podiatrist Eye exam is current  Hypertension  This is a chronic problem  The current episode started more than 1 year ago  The problem has been waxing and waning since onset  The problem is controlled  Pertinent negatives include no anxiety, blurred vision, chest pain, headaches, malaise/fatigue, neck pain, orthopnea, palpitations, peripheral edema, PND, shortness of breath or sweats  There are no associated agents to hypertension  Risk factors for coronary artery disease include diabetes mellitus, dyslipidemia, family history, male gender and obesity  Past treatments include ACE inhibitors  The current treatment provides moderate improvement   Compliance problems include diet, exercise and psychosocial issues  There is no history of angina, kidney disease, CAD/MI, CVA, heart failure, left ventricular hypertrophy, PVD or retinopathy  Identifiable causes of hypertension include sleep apnea  There is no history of chronic renal disease, a hypertension causing med or a thyroid problem  Hyperlipidemia  This is a chronic problem  The current episode started more than 1 year ago  Exacerbating diseases include diabetes  He has no history of chronic renal disease, hypothyroidism, liver disease, obesity or nephrotic syndrome  There are no known factors aggravating his hyperlipidemia  Pertinent negatives include no chest pain, focal sensory loss, focal weakness, leg pain, myalgias or shortness of breath  Current antihyperlipidemic treatment includes diet change and exercise  The current treatment provides moderate improvement of lipids  Compliance problems include adherence to diet, adherence to exercise and psychosocial issues  Risk factors for coronary artery disease include diabetes mellitus, dyslipidemia, family history, hypertension, male sex and obesity         The following portions of the patient's history were reviewed and updated as appropriate: allergies, current medications, past family history, past medical history, past social history, past surgical history and problem list       Current Outpatient Medications:     aspirin (ECOTRIN LOW STRENGTH) 81 mg EC tablet, Take 1 tablet (81 mg total) by mouth daily, Disp: 30 tablet, Rfl:     benzonatate (TESSALON PERLES) 100 mg capsule, Take 1 capsule (100 mg total) by mouth 3 (three) times a day as needed for cough, Disp: 20 capsule, Rfl: 0    fluticasone (FLONASE) 50 mcg/act nasal spray, 1 spray into each nostril daily, Disp: 9 9 mL, Rfl: 0    glucose blood test strip, Test glucose daily or as directed by physician, Disp: 100 each, Rfl: 11    glucose monitoring kit (FREESTYLE) monitoring kit, Test glucose daily or as directed by physician, Disp: 1 each, Rfl: 1    Lancets (FREESTYLE) lancets, Test glucose daily or as directed by physician, Disp: 100 each, Rfl: 11    lisinopril (ZESTRIL) 10 mg tablet, Take 1 tablet (10 mg total) by mouth daily, Disp: 90 tablet, Rfl: 1    metFORMIN (GLUCOPHAGE) 500 mg tablet, Take 2 tablets (1,000 mg total) by mouth 2 (two) times a day with meals, Disp: 360 tablet, Rfl: 1    omeprazole (PriLOSEC) 40 MG capsule, Take 1 capsule (40 mg total) by mouth daily, Disp: 90 capsule, Rfl: 1    Empagliflozin 10 MG TABS, Take 1 tablet (10 mg total) by mouth every morning, Disp: 90 tablet, Rfl: 0      Review of Systems   Constitutional: Negative for activity change, fatigue, fever and malaise/fatigue  HENT: Negative for congestion, ear pain, sinus pain and sore throat  Eyes: Negative for blurred vision, pain, itching and visual disturbance  Respiratory: Negative for cough and shortness of breath  Cardiovascular: Negative for chest pain, palpitations, orthopnea, leg swelling and PND  Gastrointestinal: Negative for abdominal pain, constipation, diarrhea, nausea and vomiting  Endocrine: Negative for cold intolerance and heat intolerance  Genitourinary: Negative for dysuria  Musculoskeletal: Positive for arthralgias (x6-8 mo, worsening, diffuse)  Negative for myalgias and neck pain  Skin: Negative for color change and rash  Neurological: Negative for dizziness, focal weakness, syncope and headaches  Hematological: Negative for adenopathy  Psychiatric/Behavioral: Negative for dysphoric mood  The patient is not nervous/anxious  Objective:      /82   Pulse 99   Temp 97 8 °F (36 6 °C)   Resp 18   Ht 5' 7 5" (1 715 m)   Wt 73 kg (161 lb)   SpO2 98%   BMI 24 84 kg/m²          Physical Exam  Vitals and nursing note reviewed  Constitutional:       General: He is not in acute distress  Appearance: Normal appearance  He is well-developed  He is not ill-appearing  Comments: Body mass index is 24 84 kg/m²  HENT:      Head: Normocephalic and atraumatic  Eyes:      General: No scleral icterus  Conjunctiva/sclera: Conjunctivae normal       Pupils: Pupils are equal, round, and reactive to light  Cardiovascular:      Rate and Rhythm: Normal rate and regular rhythm  Heart sounds: Normal heart sounds  No murmur heard  Pulmonary:      Effort: Pulmonary effort is normal  No respiratory distress  Breath sounds: Normal breath sounds  Abdominal:      General: Bowel sounds are normal  There is no distension  Palpations: Abdomen is soft  Tenderness: There is no abdominal tenderness  Musculoskeletal:         General: Normal range of motion  Cervical back: Normal range of motion and neck supple  Right lower leg: No edema  Left lower leg: No edema  Lymphadenopathy:      Cervical: No cervical adenopathy  Skin:     General: Skin is warm and dry  Coloration: Skin is not jaundiced  Findings: No rash  Neurological:      General: No focal deficit present  Mental Status: He is alert and oriented to person, place, and time  Cranial Nerves: No cranial nerve deficit        Gait: Gait normal    Psychiatric:         Mood and Affect: Mood normal          Behavior: Behavior normal

## 2022-05-26 ENCOUNTER — OFFICE VISIT (OUTPATIENT)
Dept: FAMILY MEDICINE CLINIC | Facility: OTHER | Age: 52
End: 2022-05-26
Payer: COMMERCIAL

## 2022-05-26 VITALS
DIASTOLIC BLOOD PRESSURE: 90 MMHG | OXYGEN SATURATION: 98 % | TEMPERATURE: 97.8 F | SYSTOLIC BLOOD PRESSURE: 122 MMHG | BODY MASS INDEX: 24.83 KG/M2 | WEIGHT: 163.8 LBS | HEART RATE: 96 BPM | RESPIRATION RATE: 18 BRPM | HEIGHT: 68 IN

## 2022-05-26 DIAGNOSIS — E11.9 TYPE 2 DIABETES MELLITUS WITHOUT COMPLICATION, WITHOUT LONG-TERM CURRENT USE OF INSULIN (HCC): Primary | ICD-10-CM

## 2022-05-26 DIAGNOSIS — R07.89 SENSATION OF CHEST TIGHTNESS: ICD-10-CM

## 2022-05-26 PROCEDURE — 99214 OFFICE O/P EST MOD 30 MIN: CPT | Performed by: FAMILY MEDICINE

## 2022-05-26 PROCEDURE — 3008F BODY MASS INDEX DOCD: CPT | Performed by: FAMILY MEDICINE

## 2022-05-27 NOTE — PROGRESS NOTES
Assessment/Plan:    No problem-specific Assessment & Plan notes found for this encounter  Diagnoses and all orders for this visit:    Type 2 diabetes mellitus without complication, without long-term current use of insulin ([facility] [address] )  Comments:  Trial of Farxiga sent to pharmacy  Pt unable to tolerate GI s/e of Jardiance  Next labs due July  Orders:  -     Dapagliflozin Propanediol (Farxiga) 5 MG TABS; Take 1 tablet (5 mg total) by mouth daily  -     Stress test only, exercise; Future  -     Echo complete w/ contrast if indicated; Future    Sensation of chest tightness  Comments:  DDX includes ACS, GERD, MSK strain, Anxiety  No current sx so will refer for exercise stress test + echo given T2DM dx  Orders:  -     Stress test only, exercise; Future  -     Echo complete w/ contrast if indicated; Future      BMI Counseling: Body mass index is 25 28 kg/m²  The BMI is above normal  Nutrition recommendations include decreasing portion sizes, encouraging healthy choices of fruits and vegetables, decreasing fast food intake, consuming healthier snacks, limiting drinks that contain sugar, moderation in carbohydrate intake, increasing intake of lean protein, reducing intake of saturated and trans fat and reducing intake of cholesterol  Exercise recommendations include moderate physical activity 150 minutes/week  Rationale for BMI follow-up plan is due to patient being overweight or obese  Return for Next scheduled follow up  The patient indicates understanding of these issues and agrees with the plan  Subjective:      Patient ID: Selena Owusu is a 50 y o  male      HPI   Pt presents to discuss DM medications  At last visit (approx 1 month ago) pt was started on Jardiance d/t high A1c and ASCVD Risk score  Pt took medication for about 1 wk and d/c on his own citing intolerable GI side effects (nausea, dyspepsia)  Continues to monitor his glucose sporadically at home  Is willing to try another oral alternative -- not too keen on injectables    Pt also mentions intermittent chest tightness L>R   This happens randomly -- NOT associated with exertion/stress  Denies any concurrent SOB, n/v, dizziness  Not experiencing any sx at this time        The following portions of the patient's history were reviewed and updated as appropriate: allergies, current medications, past family history, past medical history, past social history, past surgical history and problem list       Current Outpatient Medications:     aspirin (ECOTRIN LOW STRENGTH) 81 mg EC tablet, Take 1 tablet (81 mg total) by mouth daily, Disp: 30 tablet, Rfl:     benzonatate (TESSALON PERLES) 100 mg capsule, Take 1 capsule (100 mg total) by mouth 3 (three) times a day as needed for cough, Disp: 20 capsule, Rfl: 0    Dapagliflozin Propanediol (Farxiga) 5 MG TABS, Take 1 tablet (5 mg total) by mouth daily, Disp: 30 tablet, Rfl: 1    fluticasone (FLONASE) 50 mcg/act nasal spray, 1 spray into each nostril daily, Disp: 9 9 mL, Rfl: 0    glucose blood test strip, Test glucose daily or as directed by physician, Disp: 100 each, Rfl: 11    glucose monitoring kit (FREESTYLE) monitoring kit, Test glucose daily or as directed by physician, Disp: 1 each, Rfl: 1    Lancets (FREESTYLE) lancets, Test glucose daily or as directed by physician, Disp: 100 each, Rfl: 11    lisinopril (ZESTRIL) 10 mg tablet, Take 1 tablet (10 mg total) by mouth daily, Disp: 90 tablet, Rfl: 1    metFORMIN (GLUCOPHAGE) 500 mg tablet, Take 2 tablets (1,000 mg total) by mouth 2 (two) times a day with meals, Disp: 360 tablet, Rfl: 1    omeprazole (PriLOSEC) 40 MG capsule, Take 1 capsule (40 mg total) by mouth daily, Disp: 90 capsule, Rfl: 1      Review of Systems   Constitutional: Negative for activity change, fatigue and fever  HENT: Negative for congestion, ear pain, sinus pain and sore throat  Eyes: Negative for pain, itching and visual disturbance     Respiratory: Positive for chest tightness  Negative for cough, shortness of breath and wheezing  Cardiovascular: Negative for chest pain, palpitations and leg swelling  Gastrointestinal: Negative for abdominal pain, constipation, diarrhea, nausea and vomiting  Endocrine: Negative for cold intolerance and heat intolerance  Genitourinary: Negative for dysuria  Musculoskeletal: Negative for myalgias  Skin: Negative for color change and rash  Neurological: Negative for dizziness, syncope and headaches  Hematological: Negative for adenopathy  Psychiatric/Behavioral: Negative for dysphoric mood  The patient is not nervous/anxious  Objective:      /90   Pulse 96   Temp 97 8 °F (36 6 °C)   Resp 18   Ht 5' 7 5" (1 715 m)   Wt 74 3 kg (163 lb 12 8 oz)   SpO2 98%   BMI 25 28 kg/m²          Physical Exam  Vitals and nursing note reviewed  Constitutional:       General: He is not in acute distress  Appearance: Normal appearance  He is well-developed  He is not ill-appearing  Comments: Body mass index is 25 28 kg/m²  HENT:      Head: Normocephalic and atraumatic  Eyes:      General: No scleral icterus  Conjunctiva/sclera: Conjunctivae normal       Pupils: Pupils are equal, round, and reactive to light  Cardiovascular:      Rate and Rhythm: Normal rate and regular rhythm  Heart sounds: Normal heart sounds  No murmur heard  Pulmonary:      Effort: Pulmonary effort is normal  No respiratory distress  Breath sounds: Normal breath sounds  Abdominal:      General: Bowel sounds are normal  There is no distension  Palpations: Abdomen is soft  Tenderness: There is no abdominal tenderness  Musculoskeletal:         General: Normal range of motion  Cervical back: Normal range of motion and neck supple  Right lower leg: No edema  Left lower leg: No edema  Lymphadenopathy:      Cervical: No cervical adenopathy  Skin:     General: Skin is warm and dry  Coloration: Skin is not jaundiced  Findings: No rash  Neurological:      General: No focal deficit present  Mental Status: He is alert and oriented to person, place, and time  Cranial Nerves: No cranial nerve deficit        Gait: Gait normal    Psychiatric:         Mood and Affect: Mood normal          Behavior: Behavior normal

## 2022-06-16 ENCOUNTER — HOSPITAL ENCOUNTER (OUTPATIENT)
Dept: NON INVASIVE DIAGNOSTICS | Facility: HOSPITAL | Age: 52
Discharge: HOME | End: 2022-06-16
Payer: COMMERCIAL

## 2022-06-16 VITALS
HEART RATE: 77 BPM | HEIGHT: 67 IN | BODY MASS INDEX: 25.58 KG/M2 | WEIGHT: 163 LBS | SYSTOLIC BLOOD PRESSURE: 144 MMHG | DIASTOLIC BLOOD PRESSURE: 96 MMHG

## 2022-06-16 VITALS
OXYGEN SATURATION: 97 % | BODY MASS INDEX: 25.58 KG/M2 | HEIGHT: 67 IN | WEIGHT: 163 LBS | DIASTOLIC BLOOD PRESSURE: 104 MMHG | RESPIRATION RATE: 16 BRPM | SYSTOLIC BLOOD PRESSURE: 140 MMHG | HEART RATE: 88 BPM

## 2022-06-16 DIAGNOSIS — R07.89 SENSATION OF CHEST TIGHTNESS: ICD-10-CM

## 2022-06-16 DIAGNOSIS — E11.9 TYPE 2 DIABETES MELLITUS WITHOUT COMPLICATION, WITHOUT LONG-TERM CURRENT USE OF INSULIN (HCC): ICD-10-CM

## 2022-06-16 LAB
AORTIC ROOT: 3.7 CM
APICAL FOUR CHAMBER EJECTION FRACTION: 54 %
ASCENDING AORTA: 4 CM
BASELINE ST DEPRESSION: 0 MM
E WAVE DECELERATION TIME: 181 MS
FRACTIONAL SHORTENING: 28 % (ref 28–44)
INTERVENTRICULAR SEPTUM IN DIASTOLE (PARASTERNAL SHORT AXIS VIEW): 1.4 CM
INTERVENTRICULAR SEPTUM: 1.4 CM (ref 0.6–1.1)
LAAS-AP2: 18 CM2
LAAS-AP4: 16.1 CM2
LEFT ATRIUM SIZE: 3.9 CM
LEFT INTERNAL DIMENSION IN SYSTOLE: 2.8 CM (ref 2.1–4)
LEFT VENTRICULAR INTERNAL DIMENSION IN DIASTOLE: 3.9 CM (ref 3.5–6)
LEFT VENTRICULAR POSTERIOR WALL IN END DIASTOLE: 1.4 CM
LEFT VENTRICULAR STROKE VOLUME: 35 ML
LVSV (TEICH): 35 ML
MAX HR PERCENT: 88 %
MAX HR: 150 BPM
MV E'TISSUE VEL-SEP: 8 CM/S
MV PEAK A VEL: 0.58 M/S
MV PEAK E VEL: 65 CM/S
MV STENOSIS PRESSURE HALF TIME: 52 MS
MV VALVE AREA P 1/2 METHOD: 4.23 CM2
RA PRESSURE ESTIMATED: 3 MMHG
RATE PRESSURE PRODUCT: NORMAL
RIGHT ATRIUM AREA SYSTOLE A4C: 14.7 CM2
RIGHT VENTRICLE ID DIMENSION: 3.1 CM
RV PSP: 28 MMHG
SL CV LEFT ATRIUM LENGTH A2C: 5.4 CM
SL CV LV EF: 60
SL CV PED ECHO LEFT VENTRICLE DIASTOLIC VOLUME (MOD BIPLANE) 2D: 65 ML
SL CV PED ECHO LEFT VENTRICLE SYSTOLIC VOLUME (MOD BIPLANE) 2D: 30 ML
SL CV STRESS RECOVERY BP: 98 MMHG
SL CV STRESS RECOVERY HR: 140 BPM
SL CV STRESS RECOVERY O2 SAT: 98 %
SL CV STRESS STAGE REACHED: 4
STRESS ANGINA INDEX: 0
STRESS BASELINE BP: NORMAL MMHG
STRESS BASELINE HR: 88 BPM
STRESS DUKE TREADMILL SCORE: 10
STRESS O2 SAT REST: 97 %
STRESS PEAK HR: 150 BPM
STRESS POST ESTIMATED WORKLOAD: 11.7 METS
STRESS POST EXERCISE DUR MIN: 10 MIN
STRESS POST EXERCISE DUR SEC: 0 SEC
STRESS POST O2 SAT PEAK: 98 %
STRESS POST PEAK BP: 170 MMHG
STRESS ST DEPRESSION: 0 MM
TR MAX PG: 25 MMHG
TR PEAK VELOCITY: 2.5 M/S
TRICUSPID VALVE PEAK REGURGITATION VELOCITY: 2.52 M/S

## 2022-06-16 PROCEDURE — 93016 CV STRESS TEST SUPVJ ONLY: CPT | Performed by: INTERNAL MEDICINE

## 2022-06-16 PROCEDURE — 93018 CV STRESS TEST I&R ONLY: CPT | Performed by: INTERNAL MEDICINE

## 2022-06-16 PROCEDURE — 93017 CV STRESS TEST TRACING ONLY: CPT

## 2022-06-16 PROCEDURE — 93306 TTE W/DOPPLER COMPLETE: CPT

## 2022-06-16 PROCEDURE — 93306 TTE W/DOPPLER COMPLETE: CPT | Performed by: INTERNAL MEDICINE

## 2022-06-16 NOTE — RESULT ENCOUNTER NOTE
Please inform pt that stress test and echo were both normal   His symptoms do not appear to be cardiac in nature  Follow up is recommended for persistent/worsening sx  Thanks!   Valeria Mccrary, DO

## 2022-06-17 ENCOUNTER — TELEPHONE (OUTPATIENT)
Dept: FAMILY MEDICINE CLINIC | Facility: OTHER | Age: 52
End: 2022-06-17

## 2022-06-17 NOTE — TELEPHONE ENCOUNTER
----- Message from Luz Elena Baig DO sent at 6/16/2022  2:32 PM EDT -----  Please inform pt that stress test and echo were both normal   His symptoms do not appear to be cardiac in nature  Follow up is recommended for persistent/worsening sx  Thanks!   Luz Elena Baig DO

## 2022-06-21 LAB
CHEST PAIN STATEMENT: NORMAL
MAX DIASTOLIC BP: 96 MMHG
MAX HEART RATE: 150 BPM
MAX PREDICTED HEART RATE: 170 BPM
MAX. SYSTOLIC BP: 164 MMHG
PROTOCOL NAME: NORMAL
REASON FOR TERMINATION: NORMAL
TARGET HR FORMULA: NORMAL
TEST INDICATION: NORMAL
TIME IN EXERCISE PHASE: NORMAL

## 2022-08-01 DIAGNOSIS — E11.9 TYPE 2 DIABETES MELLITUS WITHOUT COMPLICATION, WITHOUT LONG-TERM CURRENT USE OF INSULIN (HCC): ICD-10-CM

## 2022-09-07 ENCOUNTER — LAB (OUTPATIENT)
Dept: LAB | Facility: CLINIC | Age: 52
End: 2022-09-07
Payer: COMMERCIAL

## 2022-09-07 DIAGNOSIS — E11.9 TYPE 2 DIABETES MELLITUS WITHOUT COMPLICATION, WITHOUT LONG-TERM CURRENT USE OF INSULIN (HCC): ICD-10-CM

## 2022-09-07 DIAGNOSIS — I10 ESSENTIAL HYPERTENSION: ICD-10-CM

## 2022-09-07 DIAGNOSIS — M25.50 POLYARTHRALGIA: ICD-10-CM

## 2022-09-07 DIAGNOSIS — E11.69 DYSLIPIDEMIA ASSOCIATED WITH TYPE 2 DIABETES MELLITUS (HCC): ICD-10-CM

## 2022-09-07 DIAGNOSIS — E78.5 DYSLIPIDEMIA ASSOCIATED WITH TYPE 2 DIABETES MELLITUS (HCC): ICD-10-CM

## 2022-09-07 LAB
ALBUMIN SERPL BCP-MCNC: 4.4 G/DL (ref 3.5–5)
ALP SERPL-CCNC: 42 U/L (ref 34–104)
ALT SERPL W P-5'-P-CCNC: 52 U/L (ref 7–52)
ANION GAP SERPL CALCULATED.3IONS-SCNC: 7 MMOL/L (ref 4–13)
AST SERPL W P-5'-P-CCNC: 38 U/L (ref 13–39)
BILIRUB SERPL-MCNC: 0.76 MG/DL (ref 0.2–1)
BUN SERPL-MCNC: 11 MG/DL (ref 5–25)
CALCIUM SERPL-MCNC: 9.9 MG/DL (ref 8.4–10.2)
CHLORIDE SERPL-SCNC: 103 MMOL/L (ref 96–108)
CHOLEST SERPL-MCNC: 192 MG/DL
CO2 SERPL-SCNC: 31 MMOL/L (ref 21–32)
CREAT SERPL-MCNC: 0.97 MG/DL (ref 0.6–1.3)
EST. AVERAGE GLUCOSE BLD GHB EST-MCNC: 128 MG/DL
GFR SERPL CREATININE-BSD FRML MDRD: 90 ML/MIN/1.73SQ M
GLUCOSE P FAST SERPL-MCNC: 138 MG/DL (ref 65–99)
HBA1C MFR BLD: 6.1 %
HDLC SERPL-MCNC: 51 MG/DL
LDLC SERPL CALC-MCNC: 120 MG/DL (ref 0–100)
NONHDLC SERPL-MCNC: 141 MG/DL
POTASSIUM SERPL-SCNC: 4.4 MMOL/L (ref 3.5–5.3)
PROT SERPL-MCNC: 6.8 G/DL (ref 6.4–8.4)
SODIUM SERPL-SCNC: 141 MMOL/L (ref 135–147)
TRIGL SERPL-MCNC: 103 MG/DL

## 2022-09-07 PROCEDURE — 3044F HG A1C LEVEL LT 7.0%: CPT | Performed by: INTERNAL MEDICINE

## 2022-09-07 PROCEDURE — 83036 HEMOGLOBIN GLYCOSYLATED A1C: CPT

## 2022-09-07 PROCEDURE — 80053 COMPREHEN METABOLIC PANEL: CPT

## 2022-09-07 PROCEDURE — 86618 LYME DISEASE ANTIBODY: CPT

## 2022-09-07 PROCEDURE — 80061 LIPID PANEL: CPT

## 2022-09-07 PROCEDURE — 36415 COLL VENOUS BLD VENIPUNCTURE: CPT

## 2022-09-08 LAB — B BURGDOR IGG+IGM SER-ACNC: 0.2 AI

## 2022-09-09 ENCOUNTER — TELEPHONE (OUTPATIENT)
Dept: FAMILY MEDICINE CLINIC | Facility: OTHER | Age: 52
End: 2022-09-09

## 2022-09-09 NOTE — RESULT ENCOUNTER NOTE
Please schedule annual physical and lab review  Pt no-showed on 8/29 AND 9/8 for visits -- I know he prefers to see me, so he will need to schedule something soon as I will be out on KIAN Joyner, DO

## 2022-09-09 NOTE — TELEPHONE ENCOUNTER
Left detailed message on patients voicemail to return office call and schedule physical with dr Tommy Vázquez

## 2022-09-09 NOTE — TELEPHONE ENCOUNTER
----- Message from Cecilia Bhandari DO sent at 9/9/2022  8:38 AM EDT -----  Please schedule annual physical and lab review  Pt no-showed on 8/29 AND 9/8 for visits -- I know he prefers to see me, so he will need to schedule something soon as I will be out on [facility]      Cecilia Bhandari,

## 2022-09-12 ENCOUNTER — OFFICE VISIT (OUTPATIENT)
Dept: GASTROENTEROLOGY | Facility: CLINIC | Age: 52
End: 2022-09-12
Payer: COMMERCIAL

## 2022-09-12 VITALS
DIASTOLIC BLOOD PRESSURE: 99 MMHG | WEIGHT: 154 LBS | BODY MASS INDEX: 24.17 KG/M2 | SYSTOLIC BLOOD PRESSURE: 123 MMHG | HEART RATE: 100 BPM | HEIGHT: 67 IN

## 2022-09-12 DIAGNOSIS — Z86.010 HISTORY OF COLONIC POLYPS: ICD-10-CM

## 2022-09-12 DIAGNOSIS — K21.00 GASTROESOPHAGEAL REFLUX DISEASE WITH ESOPHAGITIS WITHOUT HEMORRHAGE: Primary | ICD-10-CM

## 2022-09-12 DIAGNOSIS — K22.70 BARRETT'S ESOPHAGUS WITHOUT DYSPLASIA: ICD-10-CM

## 2022-09-12 PROBLEM — Z86.0100 HISTORY OF COLON POLYPS: Status: ACTIVE | Noted: 2022-09-12

## 2022-09-12 PROCEDURE — 99213 OFFICE O/P EST LOW 20 MIN: CPT | Performed by: INTERNAL MEDICINE

## 2022-09-12 PROCEDURE — 3080F DIAST BP >= 90 MM HG: CPT | Performed by: INTERNAL MEDICINE

## 2022-09-12 PROCEDURE — 3074F SYST BP LT 130 MM HG: CPT | Performed by: INTERNAL MEDICINE

## 2022-09-12 RX ORDER — OMEPRAZOLE 20 MG/1
20 CAPSULE, DELAYED RELEASE ORAL DAILY
Qty: 90 CAPSULE | Refills: 3 | Status: SHIPPED | OUTPATIENT
Start: 2022-09-12 | End: 2022-10-19 | Stop reason: HOSPADM

## 2022-09-12 NOTE — PROGRESS NOTES
[facility] - Outpatient Follow-up Note  Dawit Cai 50 y o  male MRN: [id number]  Encounter: [id number]          ASSESSMENT AND PLAN:      1  Gastroesophageal reflux disease with esophagitis without hemorrhage  Reflux controlled on 40 mg of Prilosec will reduce to 20  Barretts surveillance is due  - EGD; Future  - omeprazole (PriLOSEC) 20 mg delayed release capsule; Take 1 capsule (20 mg total) by mouth daily  Dispense: 90 capsule; Refill: 3    2  Carter's esophagus without dysplasia  As above  - EGD; Future  - omeprazole (PriLOSEC) 20 mg delayed release capsule; Take 1 capsule (20 mg total) by mouth daily  Dispense: 90 capsule; Refill: 3    3  History of colonic polyps  Next colonoscopy due in August of 2025  Does have history of hyperplastic polyps  He will otherwise follow-up in a year       ______________________________________________________________________    SUBJECTIVE:  Very pleasant 61-year-old gentleman history of Barretts esophagus due for surveillance reflux is under control  Colon cancer screening is up-to-date  Tells me he is developed some diabetes and has lost a lot of weight intentionally  REVIEW OF SYSTEMS IS OTHERWISE NEGATIVE        Historical Information   Past Medical History:   Diagnosis Date    History of illicit drug use     cocaine noted in social history     Past Surgical History:   Procedure Laterality Date    CHOLECYSTECTOMY      CHOLECYSTECTOMY      COLONOSCOPY W/ BIOPSIES  11/2011    polyps     Social History   Social History     Substance and Sexual Activity   Alcohol Use Yes    Alcohol/week: 2 0 standard drinks    Types: 2 Cans of beer per week    Comment: weekly     Social History     Substance and Sexual Activity   Drug Use No    Comment: not currently     Social History     Tobacco Use   Smoking Status Former Smoker   Smokeless Tobacco Former User   Tobacco Comment    Vape     Family History   Problem Relation Age of Onset    Hyperlipidemia Father     Hypertension Mother     Diabetes Mother     Hyperlipidemia Mother        Meds/Allergies       Current Outpatient Medications:     aspirin (ECOTRIN LOW STRENGTH) 81 mg EC tablet    Dapagliflozin Propanediol (Farxiga) 5 MG TABS    glucose blood test strip    glucose monitoring kit (FREESTYLE) monitoring kit    Lancets (FREESTYLE) lancets    lisinopril (ZESTRIL) 10 mg tablet    metFORMIN (GLUCOPHAGE) 500 mg tablet    omeprazole (PriLOSEC) 20 mg delayed release capsule    benzonatate (TESSALON PERLES) 100 mg capsule    fluticasone (FLONASE) 50 mcg/act nasal spray    Allergies   Allergen Reactions    Jardiance [Empagliflozin] Nausea Only           Objective     Blood pressure 123/99, pulse 100, height 5' 7" (1 702 m), weight 69 9 kg (154 lb)  Body mass index is 24 12 kg/m²  PHYSICAL EXAM:      General Appearance:   Alert, cooperative, no distress   HEENT:   Normocephalic, atraumatic, anicteric      Neck:  Supple, symmetrical, trachea midline   Lungs:   Clear to auscultation bilaterally; no rales, rhonchi or wheezing; respirations unlabored    Heart[de-identified]   Regular rate and rhythm; no murmur, rub, or gallop  Abdomen:   Soft, non-tender, non-distended; normal bowel sounds; no masses, no organomegaly    Genitalia:   Deferred    Rectal:   Deferred    Extremities:  No cyanosis, clubbing or edema    Pulses:  2+ and symmetric    Skin:  No jaundice, rashes, or lesions    Lymph nodes:  No palpable cervical lymphadenopathy        Lab Results:   No visits with results within 1 Day(s) from this visit     Latest known visit with results is:   Lab on 09/07/2022   Component Date Value    Sodium 09/07/2022 141     Potassium 09/07/2022 4 4     Chloride 09/07/2022 103     CO2 09/07/2022 31     ANION GAP 09/07/2022 7     BUN 09/07/2022 11     Creatinine 09/07/2022 0 97     Glucose, Fasting 09/07/2022 138 (A)    Calcium 09/07/2022 9 9     AST 09/07/2022 38     ALT 09/07/2022 52     Alkaline Phosphatase 09/07/2022 42     Total Protein 09/07/2022 6 8     Albumin 09/07/2022 4 4     Total Bilirubin 09/07/2022 0 76     eGFR 09/07/2022 90     Hemoglobin A1C 09/07/2022 6 1 (A)    EAG 09/07/2022 128     Cholesterol 09/07/2022 192     Triglycerides 09/07/2022 103     HDL, Direct 09/07/2022 51     LDL Calculated 09/07/2022 120 (A)    Non-HDL-Chol (CHOL-HDL) 09/07/2022 141     Lyme Total Antibodies 09/07/2022 0 2          Radiology Results:   No results found

## 2022-09-22 DIAGNOSIS — E11.9 TYPE 2 DIABETES MELLITUS WITHOUT COMPLICATION, WITHOUT LONG-TERM CURRENT USE OF INSULIN (HCC): ICD-10-CM

## 2022-09-22 RX ORDER — DAPAGLIFLOZIN 5 MG/1
TABLET, FILM COATED ORAL
Qty: 30 TABLET | Refills: 1 | Status: SHIPPED | OUTPATIENT
Start: 2022-09-22

## 2022-10-12 ENCOUNTER — TELEPHONE (OUTPATIENT)
Dept: GASTROENTEROLOGY | Facility: CLINIC | Age: 52
End: 2022-10-12

## 2022-10-12 NOTE — TELEPHONE ENCOUNTER
Spoke to pt confirming pt's egd scheduled on 10/19/22 with Dr Deniz Parson at [facility]  Informed [facility] would be calling 1-2 days prior with arrival time  Informed pt would need to be npo and would need a  due to being under sedation day of procedure  I asked pt to please call back if has not received instructions or if has any questions  Advised to contact insurance if has any questions regarding coverage of procedure

## 2022-10-19 ENCOUNTER — ANESTHESIA EVENT (OUTPATIENT)
Dept: GASTROENTEROLOGY | Facility: AMBULATORY SURGERY CENTER | Age: 52
End: 2022-10-19

## 2022-10-19 ENCOUNTER — HOSPITAL ENCOUNTER (OUTPATIENT)
Dept: GASTROENTEROLOGY | Facility: AMBULATORY SURGERY CENTER | Age: 52
Discharge: HOME | End: 2022-10-19

## 2022-10-19 ENCOUNTER — ANESTHESIA (OUTPATIENT)
Dept: GASTROENTEROLOGY | Facility: AMBULATORY SURGERY CENTER | Age: 52
End: 2022-10-19

## 2022-10-19 VITALS
DIASTOLIC BLOOD PRESSURE: 93 MMHG | WEIGHT: 160 LBS | RESPIRATION RATE: 18 BRPM | HEIGHT: 69 IN | BODY MASS INDEX: 23.7 KG/M2 | TEMPERATURE: 96.4 F | HEART RATE: 89 BPM | OXYGEN SATURATION: 95 % | SYSTOLIC BLOOD PRESSURE: 131 MMHG

## 2022-10-19 DIAGNOSIS — K21.00 GASTROESOPHAGEAL REFLUX DISEASE WITH ESOPHAGITIS WITHOUT HEMORRHAGE: ICD-10-CM

## 2022-10-19 DIAGNOSIS — K22.70 BARRETT'S ESOPHAGUS WITHOUT DYSPLASIA: ICD-10-CM

## 2022-10-19 RX ORDER — PROPOFOL 10 MG/ML
INJECTION, EMULSION INTRAVENOUS AS NEEDED
Status: DISCONTINUED | OUTPATIENT
Start: 2022-10-19 | End: 2022-10-19

## 2022-10-19 RX ORDER — OMEPRAZOLE 40 MG/1
40 CAPSULE, DELAYED RELEASE ORAL DAILY
Qty: 90 CAPSULE | Refills: 3 | Status: SHIPPED | OUTPATIENT
Start: 2022-10-19

## 2022-10-19 RX ORDER — SODIUM CHLORIDE 9 MG/ML
INJECTION, SOLUTION INTRAVENOUS CONTINUOUS PRN
Status: DISCONTINUED | OUTPATIENT
Start: 2022-10-19 | End: 2022-10-19

## 2022-10-19 RX ORDER — LIDOCAINE HYDROCHLORIDE 10 MG/ML
INJECTION, SOLUTION EPIDURAL; INFILTRATION; INTRACAUDAL; PERINEURAL AS NEEDED
Status: DISCONTINUED | OUTPATIENT
Start: 2022-10-19 | End: 2022-10-19

## 2022-10-19 RX ADMIN — PROPOFOL 50 MG: 10 INJECTION, EMULSION INTRAVENOUS at 09:14

## 2022-10-19 RX ADMIN — PROPOFOL 50 MG: 10 INJECTION, EMULSION INTRAVENOUS at 09:12

## 2022-10-19 RX ADMIN — PROPOFOL 110 MG: 10 INJECTION, EMULSION INTRAVENOUS at 09:07

## 2022-10-19 RX ADMIN — SODIUM CHLORIDE: 9 INJECTION, SOLUTION INTRAVENOUS at 09:02

## 2022-10-19 RX ADMIN — LIDOCAINE HYDROCHLORIDE 80 MG: 10 INJECTION, SOLUTION EPIDURAL; INFILTRATION; INTRACAUDAL; PERINEURAL at 09:07

## 2022-10-19 RX ADMIN — PROPOFOL 50 MG: 10 INJECTION, EMULSION INTRAVENOUS at 09:15

## 2022-10-19 RX ADMIN — PROPOFOL 50 MG: 10 INJECTION, EMULSION INTRAVENOUS at 09:16

## 2022-10-19 RX ADMIN — PROPOFOL 90 MG: 10 INJECTION, EMULSION INTRAVENOUS at 09:10

## 2022-10-19 NOTE — ANESTHESIA POSTPROCEDURE EVALUATION
Post-Op Assessment Note    CV Status:  Stable    Pain management: adequate     Mental Status:  Sleepy   Hydration Status:  Euvolemic   PONV Controlled:  Controlled   Airway Patency:  Patent      Post Op Vitals Reviewed: Yes      Staff: Anesthesiologist, CRNA         No complications documented      BP  144/97   Temp     Pulse  105   Resp   17   SpO2   98

## 2022-10-19 NOTE — ANESTHESIA PREPROCEDURE EVALUATION
Procedure:  EGD    Relevant Problems   CARDIO   (+) Essential hypertension   (+) Essential hypertriglyceridemia   (+) Orthopnea      ENDO   (+) Dyslipidemia associated with type 2 diabetes mellitus (HCC)   (+) Type 2 diabetes mellitus without complication, without long-term current use of insulin (HCC)      GI/HEPATIC   (+) Esophageal reflux      NEURO/PSYCH   (+) Anxiety   (+) History of colon polyps      PULMONARY   (+) TOBIN (obstructive sleep apnea)   (+) Orthopnea        Physical Exam    Airway    Mallampati score: III  TM Distance: >3 FB  Neck ROM: full     Dental   No notable dental hx     Cardiovascular  Cardiovascular exam normal    Pulmonary  Pulmonary exam normal     Other Findings        Anesthesia Plan  ASA Score- 2     Anesthesia Type- IV sedation with anesthesia with ASA Monitors  Additional Monitors:   Airway Plan:           Plan Factors-Exercise tolerance (METS): >4 METS  Chart reviewed  Patient summary reviewed  Patient is not a current smoker  Induction- intravenous  Postoperative Plan-     Informed Consent- Anesthetic plan and risks discussed with patient

## 2022-10-19 NOTE — H&P
History and Physical - [facility]  Jacey Townsend 50 y o  male MRN: [id number]                  HPI: Jacey Townsend is a 50y o  year old male who presents for upper endoscopy due to history of Barretts esophagus      REVIEW OF SYSTEMS: Per the HPI, and otherwise unremarkable  Historical Information   Past Medical History:   Diagnosis Date   • Carter esophagus    • Colon polyp    • GERD (gastroesophageal reflux disease)    • History of illicit drug use     cocaine noted in social history   • Type 2 diabetes mellitus ([facility] )     10/19/22  Feels well today regarding blood sugar     Past Surgical History:   Procedure Laterality Date   • CHOLECYSTECTOMY     • CHOLECYSTECTOMY     • COLONOSCOPY W/ BIOPSIES  2011    polyps   • EGD       Social History   Social History     Substance and Sexual Activity   Alcohol Use Yes   • Alcohol/week: 2 0 standard drinks   • Types: 2 Cans of beer per week    Comment: weekly     Social History     Substance and Sexual Activity   Drug Use No    Comment: not currently     Social History     Tobacco Use   Smoking Status Former Smoker   • Types: Cigarettes   • Quit date:    • Years since quittin 8   Smokeless Tobacco Never Used     Family History   Problem Relation Age of Onset   • Hypertension Mother    • Diabetes Mother    • Hyperlipidemia Mother    • Hyperlipidemia Father        Meds/Allergies       Current Outpatient Medications:   •  aspirin (ECOTRIN LOW STRENGTH) 81 mg EC tablet  •  Farxiga 5 MG TABS  •  lisinopril (ZESTRIL) 10 mg tablet  •  metFORMIN (GLUCOPHAGE) 500 mg tablet  •  omeprazole (PriLOSEC) 20 mg delayed release capsule  •  glucose blood test strip  •  glucose monitoring kit (FREESTYLE) monitoring kit  •  Lancets (FREESTYLE) lancets  No current facility-administered medications for this encounter      Facility-Administered Medications Ordered in Other Encounters:   •  sodium chloride 0 9 % infusion, , Intravenous, Continuous PRN, New Bag at 10/19/22 0902    Allergies   Allergen Reactions   • Jardiance [Empagliflozin] Nausea Only       Objective     BP (!) 138/101   Pulse 77   Temp (!) 96 4 °F (35 8 °C) (Skin)   Resp 18   Ht 5' 8 5" (1 74 m)   Wt 72 6 kg (160 lb)   SpO2 95%   BMI 23 97 kg/m²       PHYSICAL EXAM    Gen: NAD  Head: NCAT  CV: RRR  CHEST: Clear  ABD: soft, NT/ND  EXT: no edema      ASSESSMENT/PLAN:  This is a 50y o  year old male here for upper endoscopy, and he is stable and optimized for his procedure

## 2022-10-24 DIAGNOSIS — I10 ESSENTIAL HYPERTENSION: ICD-10-CM

## 2022-10-24 RX ORDER — LISINOPRIL 10 MG/1
TABLET ORAL
Qty: 90 TABLET | Refills: 1 | Status: SHIPPED | OUTPATIENT
Start: 2022-10-24 | End: 2022-10-26 | Stop reason: SDUPTHER

## 2022-10-26 DIAGNOSIS — I10 ESSENTIAL HYPERTENSION: ICD-10-CM

## 2022-10-26 RX ORDER — LISINOPRIL 10 MG/1
10 TABLET ORAL DAILY
Qty: 90 TABLET | Refills: 1 | Status: SHIPPED | OUTPATIENT
Start: 2022-10-26

## 2022-11-23 DIAGNOSIS — E11.9 TYPE 2 DIABETES MELLITUS WITHOUT COMPLICATION, WITHOUT LONG-TERM CURRENT USE OF INSULIN (HCC): ICD-10-CM

## 2022-11-23 RX ORDER — DAPAGLIFLOZIN 5 MG/1
TABLET, FILM COATED ORAL
Qty: 30 TABLET | Refills: 1 | Status: SHIPPED | OUTPATIENT
Start: 2022-11-23

## 2022-11-28 DIAGNOSIS — E11.9 TYPE 2 DIABETES MELLITUS WITHOUT COMPLICATION, WITHOUT LONG-TERM CURRENT USE OF INSULIN (HCC): ICD-10-CM

## 2022-11-30 NOTE — PROGRESS NOTES
ADULT ANNUAL [address]  [facility]    NAME: Roman Donato  AGE: 50 y o  SEX: male  : 1970     DATE: 2022     Assessment and Plan:     Problem List Items Addressed This Visit        Endocrine    Dyslipidemia associated with type 2 diabetes mellitus ([address] )    Relevant Orders    Comprehensive metabolic panel    Lipid Panel with Direct LDL reflex    Type 2 diabetes mellitus without complication, without long-term current use of insulin (HCC)    Relevant Orders    Hemoglobin A1C    Comprehensive metabolic panel    Lipid Panel with Direct LDL reflex    Microalbumin / creatinine urine ratio       Cardiovascular and Mediastinum    Essential hypertension    Relevant Orders    Comprehensive metabolic panel   Other Visit Diagnoses     Annual physical exam    -  Primary    Screening for prostate cancer        Relevant Orders    PSA, Total Screen    Need for vaccination        Relevant Orders    influenza vaccine, quadrivalent, recombinant, PF, 0 5 mL (Completed)    Encounter for immunization              Immunizations and preventive care screenings were discussed with patient today  Appropriate education was printed on patient's after visit summary  Discussed risks and benefits of prostate cancer screening  We discussed the controversial history of PSA screening for prostate cancer in the [place] as well as the risk of over detection and over treatment of prostate cancer by way of PSA screening  The patient understands that PSA blood testing is an imperfect way to screen for prostate cancer and that elevated PSA levels in the blood may also be caused by infection, inflammation, prostatic trauma or manipulation, urological procedures, or by benign prostatic enlargement      The role of the digital rectal examination in prostate cancer screening was also discussed and I discussed with him that there is large interobserver variability in the findings of digital rectal examination  Counseling:  Alcohol/drug use: discussed moderation in alcohol intake, the recommendations for healthy alcohol use, and avoidance of illicit drug use  Dental Health: discussed importance of regular tooth brushing, flossing, and dental visits  Injury prevention: discussed safety/seat belts, safety helmets, smoke detectors, carbon dioxide detectors, and smoking near bedding or upholstery  Sexual health: discussed sexually transmitted diseases, partner selection, use of condoms, avoidance of unintended pregnancy, and contraceptive alternatives  · Exercise: the importance of regular exercise/physical activity was discussed  Recommend exercise 3-5 times per week for at least 30 minutes  Depression Screening and Follow-up Plan: Patient was screened for depression during today's encounter  They screened negative with a PHQ-2 score of 0  Return in 2 weeks (on 12/15/2022) for Recheck HTN  Chief Complaint:     Chief Complaint   Patient presents with   • Physical Exam      History of Present Illness:     Adult Annual Physical   Patient here for a comprehensive physical exam  The patient reports no problems  Diet and Physical Activity  · Diet/Nutrition: well balanced diet, limited junk food, consuming 3-5 servings of fruits/vegetables daily, adequate fiber intake and adequate whole grain intake  · Exercise: no formal exercise and is active daily for work  Depression Screening  PHQ-2/9 Depression Screening    Little interest or pleasure in doing things: 0 - not at all  Feeling down, depressed, or hopeless: 0 - not at all  PHQ-2 Score: 0  PHQ-2 Interpretation: Negative depression screen       General Health  · Sleep: sleeps poorly and gets 4-6 hours of sleep on average  · Hearing: normal - bilateral   · Vision: most recent eye exam >1 year ago  · Dental: regular dental visits          Health  · Symptoms include: none     Review of Systems:     Review of Systems   Constitutional: Negative for appetite change, fatigue, fever and unexpected weight change  HENT: Negative for congestion, dental problem, ear pain, postnasal drip, sore throat and tinnitus  Eyes: Negative for pain, discharge and visual disturbance  Respiratory: Negative for cough, shortness of breath and wheezing  Cardiovascular: Negative for chest pain, palpitations and leg swelling  Gastrointestinal: Negative for abdominal pain, constipation, diarrhea, nausea and vomiting  Endocrine: Negative for cold intolerance and heat intolerance  Genitourinary: Negative for difficulty urinating, dysuria, flank pain and urgency  Musculoskeletal: Negative for arthralgias, back pain, joint swelling and myalgias  Skin: Negative for rash and wound  Allergic/Immunologic: Negative for immunocompromised state  Neurological: Negative for dizziness, syncope, speech difficulty, weakness and numbness  Hematological: Negative for adenopathy  Does not bruise/bleed easily  Psychiatric/Behavioral: Negative for confusion, dysphoric mood and sleep disturbance  The patient is not nervous/anxious         Past Medical History:     Past Medical History:   Diagnosis Date   • Carter esophagus    • Colon polyp    • GERD (gastroesophageal reflux disease)    • History of illicit drug use     cocaine noted in social history   • [facility] ([facility] )     10/19/22  Feels well today regarding blood sugar      Past Surgical History:     Past Surgical History:   Procedure Laterality Date   • CHOLECYSTECTOMY     • CHOLECYSTECTOMY     • COLONOSCOPY W/ BIOPSIES  11/2011    polyps   • EGD        Family History:     Family History   Problem Relation Age of Onset   • Hypertension Mother    • Diabetes Mother    • Hyperlipidemia Mother    • Hyperlipidemia Father       Social History:     Social History     Socioeconomic History   • Marital status: /Civil Union     Spouse name: None   • Number of children: None • Years of education: None   • Highest education level: None   Occupational History   • None   Tobacco Use   • Smoking status: Former     Types: Cigarettes     Quit date:      Years since quittin 9   • Smokeless tobacco: Never   Vaping Use   • Vaping Use: Former   Substance and Sexual Activity   • Alcohol use: Yes     Alcohol/week: 2 0 standard drinks     Types: 2 Cans of beer per week     Comment: weekly   • Drug use: No     Comment: not currently   • Sexual activity: Yes     Partners: Female   Other Topics Concern   • None   Social History Narrative   • None     Social Determinants of Health     Financial Resource Strain: Not on file   Food Insecurity: Not on file   Transportation Needs: Not on file   Physical Activity: Not on file   Stress: Not on file   Social Connections: Not on file   Intimate Partner Violence: Not on file   Housing Stability: Not on file      Current Medications:     Current Outpatient Medications   Medication Sig Dispense Refill   • aspirin (ECOTRIN LOW STRENGTH) 81 mg EC tablet Take 1 tablet (81 mg total) by mouth daily 30 tablet    • Farxiga 5 MG TABS take 1 tablet by mouth once daily 30 tablet 1   • glucose blood test strip Test glucose daily or as directed by physician 100 each 11   • glucose monitoring kit (FREESTYLE) monitoring kit Test glucose daily or as directed by physician 1 each 1   • Lancets (FREESTYLE) lancets Test glucose daily or as directed by physician 100 each 11   • lisinopril (ZESTRIL) 10 mg tablet Take 1 tablet (10 mg total) by mouth daily 90 tablet 1   • metFORMIN (GLUCOPHAGE) 500 mg tablet Take 2 tablets (1,000 mg total) by mouth 2 (two) times a day with meals 360 tablet 1   • omeprazole (PriLOSEC) 40 MG capsule Take 1 capsule (40 mg total) by mouth daily 90 capsule 3     No current facility-administered medications for this visit  Allergies:      Allergies   Allergen Reactions   • Jardiance [Empagliflozin] Nausea Only      Physical Exam:     /100 Pulse 96   Temp 99 °F (37 2 °C)   Resp 16   Ht 5' 8 5" (1 74 m)   Wt 71 2 kg (157 lb)   SpO2 97%   BMI 23 52 kg/m²     Physical Exam  Vitals and nursing note reviewed  Constitutional:       General: He is not in acute distress  Appearance: Normal appearance  He is well-developed and well-nourished  He is not ill-appearing  Comments: Body mass index is 23 52 kg/m²  HENT:      Head: Normocephalic and atraumatic  Right Ear: Hearing, tympanic membrane, ear canal and external ear normal       Left Ear: Hearing, tympanic membrane, ear canal and external ear normal       Nose: Nose normal       Mouth/Throat:      Mouth: Oropharynx is clear and moist and mucous membranes are normal       Pharynx: Uvula midline  Eyes:      General: No scleral icterus  Extraocular Movements: EOM normal       Conjunctiva/sclera: Conjunctivae normal       Pupils: Pupils are equal, round, and reactive to light  Neck:      Thyroid: No thyromegaly  Vascular: No JVD  Cardiovascular:      Rate and Rhythm: Normal rate and regular rhythm  Pulses: no weak pulses          Dorsalis pedis pulses are 2+ on the right side and 2+ on the left side  Posterior tibial pulses are 2+ on the right side and 2+ on the left side  Heart sounds: Normal heart sounds  No murmur heard  Pulmonary:      Effort: Pulmonary effort is normal  No respiratory distress  Breath sounds: Normal breath sounds  Abdominal:      General: Bowel sounds are normal  There is no distension  Palpations: Abdomen is soft  There is no mass  Tenderness: There is no abdominal tenderness  Musculoskeletal:         General: No tenderness or edema  Normal range of motion  Cervical back: Normal range of motion and neck supple  Right lower leg: No edema  Left lower leg: No edema  Feet:      Right foot:      Skin integrity: No ulcer, skin breakdown, erythema, warmth, callus or dry skin        Left foot:      Skin integrity: No ulcer, skin breakdown, erythema, warmth, callus or dry skin  Lymphadenopathy:      Cervical: No cervical adenopathy  Skin:     General: Skin is warm and dry  Capillary Refill: Capillary refill takes less than 2 seconds  Coloration: Skin is not jaundiced  Findings: No rash  Neurological:      General: No focal deficit present  Mental Status: He is alert and oriented to person, place, and time  Cranial Nerves: No cranial nerve deficit  Gait: Gait normal    Psychiatric:         Mood and Affect: Mood and affect and mood normal          Behavior: Behavior normal       Patient's shoes and socks removed  Right Foot/Ankle   Right Foot Inspection  Skin Exam: skin normal and skin intact  No dry skin, no warmth, no callus, no erythema, no maceration, no abnormal color, no pre-ulcer, no ulcer and no callus  Toe Exam: ROM and strength within normal limits  Sensory   Vibration: intact  Proprioception: intact  Monofilament testing: intact    Vascular  Capillary refills: < 3 seconds  The right DP pulse is 2+  The right PT pulse is 2+  Left Foot/Ankle  Left Foot Inspection  Skin Exam: skin normal and skin intact  No dry skin, no warmth, no erythema, no maceration, normal color, no pre-ulcer, no ulcer and no callus  Toe Exam: ROM and strength within normal limits  Sensory   Vibration: intact  Proprioception: intact  Monofilament testing: intact    Vascular  Capillary refills: < 3 seconds  The left DP pulse is 2+  The left PT pulse is 2+       Assign Risk Category  No deformity present  No loss of protective sensation  No weak pulses  Risk: 0        Josie Kaur,   [facility]

## 2022-11-30 NOTE — PATIENT INSTRUCTIONS

## 2022-12-01 ENCOUNTER — OFFICE VISIT (OUTPATIENT)
Dept: FAMILY MEDICINE CLINIC | Facility: OTHER | Age: 52
End: 2022-12-01

## 2022-12-01 VITALS
BODY MASS INDEX: 23.25 KG/M2 | SYSTOLIC BLOOD PRESSURE: 130 MMHG | HEART RATE: 96 BPM | DIASTOLIC BLOOD PRESSURE: 100 MMHG | OXYGEN SATURATION: 97 % | RESPIRATION RATE: 16 BRPM | WEIGHT: 157 LBS | TEMPERATURE: 99 F | HEIGHT: 69 IN

## 2022-12-01 DIAGNOSIS — Z23 NEED FOR VACCINATION: ICD-10-CM

## 2022-12-01 DIAGNOSIS — E11.69 DYSLIPIDEMIA ASSOCIATED WITH TYPE 2 DIABETES MELLITUS (HCC): ICD-10-CM

## 2022-12-01 DIAGNOSIS — Z23 ENCOUNTER FOR IMMUNIZATION: ICD-10-CM

## 2022-12-01 DIAGNOSIS — Z12.5 SCREENING FOR PROSTATE CANCER: ICD-10-CM

## 2022-12-01 DIAGNOSIS — I10 ESSENTIAL HYPERTENSION: ICD-10-CM

## 2022-12-01 DIAGNOSIS — Z00.00 ANNUAL PHYSICAL EXAM: Primary | ICD-10-CM

## 2022-12-01 DIAGNOSIS — E78.5 DYSLIPIDEMIA ASSOCIATED WITH TYPE 2 DIABETES MELLITUS (HCC): ICD-10-CM

## 2022-12-01 DIAGNOSIS — E11.9 TYPE 2 DIABETES MELLITUS WITHOUT COMPLICATION, WITHOUT LONG-TERM CURRENT USE OF INSULIN (HCC): ICD-10-CM

## 2022-12-16 ENCOUNTER — OFFICE VISIT (OUTPATIENT)
Dept: FAMILY MEDICINE CLINIC | Facility: OTHER | Age: 52
End: 2022-12-16

## 2022-12-16 VITALS
DIASTOLIC BLOOD PRESSURE: 78 MMHG | TEMPERATURE: 97.5 F | SYSTOLIC BLOOD PRESSURE: 110 MMHG | OXYGEN SATURATION: 97 % | WEIGHT: 154 LBS | HEART RATE: 87 BPM | BODY MASS INDEX: 22.81 KG/M2 | HEIGHT: 69 IN | RESPIRATION RATE: 16 BRPM

## 2022-12-16 DIAGNOSIS — E11.9 TYPE 2 DIABETES MELLITUS WITHOUT COMPLICATION, WITHOUT LONG-TERM CURRENT USE OF INSULIN (HCC): ICD-10-CM

## 2022-12-16 DIAGNOSIS — I10 PRIMARY HYPERTENSION: Primary | ICD-10-CM

## 2022-12-16 LAB — SL AMB POCT HEMOGLOBIN AIC: 6.3 (ref ?–6.5)

## 2022-12-16 NOTE — PROGRESS NOTES
Name: Jose J Jones      : 1970      MRN: [id number]  Encounter Provider: Louie Munoz DO  Encounter Date: 2022   Encounter department: [address] [place] [address] Dr Mercedes  Primary hypertension   BP  within goal <140/90  COnitnue lisinopril daily  Recommend sodium <2g daily and pt should begin to monitor at home BP regularly at different times of day and call if BP frequently >140/90        2  Type 2 diabetes mellitus without complication, without long-term current use of insulin ([facility])  Comments:  Reviewed pt's result during encounter with him  Slight increase but still glucoses are well controlled with Hgb A1C 6 3  Continue metformin, Farxiga, low carb diet and lifestyle modifications  Orders:  -     POCT hemoglobin A1c           Rashad Salazar is a 50 y o  male who presents for f/u of HTN  He states that he does not usually take his BP at home  Currently they take lisinopril to treat HTN and are compliant with their medication  When BP is high they state they have no headache pain, abdominal pain, visual disturbance  Aggravating factors are: diet  Relieving factors are: taking medication, lower salt, eating plenty of vegetables   Associated symptoms are: none  Review of Systems   Constitutional: Negative for activity change and fever  HENT: Negative for congestion and sore throat  Eyes: Negative for pain and visual disturbance  Respiratory: Negative for chest tightness and shortness of breath  Cardiovascular: Negative for chest pain and palpitations  Gastrointestinal: Negative for abdominal pain, nausea and vomiting  Endocrine: Negative for polydipsia  Genitourinary: Negative for difficulty urinating and dysuria  Musculoskeletal: Positive for neck pain (chronic)  Negative for back pain and myalgias  Neurological: Negative for weakness, light-headedness and headaches         Current Outpatient Medications on File Prior to Visit Medication Sig   • aspirin (ECOTRIN LOW STRENGTH) 81 mg EC tablet Take 1 tablet (81 mg total) by mouth daily   • Farxiga 5 MG TABS take 1 tablet by mouth once daily   • glucose blood test strip Test glucose daily or as directed by physician   • glucose monitoring kit (FREESTYLE) monitoring kit Test glucose daily or as directed by physician   • Lancets (FREESTYLE) lancets Test glucose daily or as directed by physician   • lisinopril (ZESTRIL) 10 mg tablet Take 1 tablet (10 mg total) by mouth daily   • metFORMIN (GLUCOPHAGE) 500 mg tablet Take 2 tablets (1,000 mg total) by mouth 2 (two) times a day with meals   • omeprazole (PriLOSEC) 40 MG capsule Take 1 capsule (40 mg total) by mouth daily       Objective     /78   Pulse 87   Temp 97 5 °F (36 4 °C)   Resp 16   Ht 5' 8 5" (1 74 m)   Wt 69 9 kg (154 lb)   SpO2 97%   BMI 23 08 kg/m²     Physical Exam  Vitals reviewed  Constitutional:       General: He is not in acute distress  Appearance: He is not ill-appearing or diaphoretic  Comments: Body mass index is 23 08 kg/m²  HENT:      Head: Normocephalic and atraumatic  Nose: Nose normal  No congestion  Mouth/Throat:      Mouth: Mucous membranes are moist       Pharynx: Oropharynx is clear  No posterior oropharyngeal erythema  Eyes:      Extraocular Movements: Extraocular movements intact  Conjunctiva/sclera: Conjunctivae normal       Pupils: Pupils are equal, round, and reactive to light  Cardiovascular:      Rate and Rhythm: Normal rate and regular rhythm  Pulses: Normal pulses  Heart sounds: Normal heart sounds  Pulmonary:      Effort: No respiratory distress  Breath sounds: No wheezing, rhonchi or rales  Abdominal:      General: Bowel sounds are normal       Palpations: Abdomen is soft  Tenderness: There is no abdominal tenderness  Musculoskeletal:      Cervical back: Neck supple  No rigidity  Right lower leg: No edema        Left lower leg: No edema  Lymphadenopathy:      Cervical: No cervical adenopathy  Skin:     General: Skin is warm and dry  Capillary Refill: Capillary refill takes less than 2 seconds  Neurological:      Mental Status: He is alert and oriented to person, place, and time  Cranial Nerves: No cranial nerve deficit     Psychiatric:         Mood and Affect: Mood normal          Behavior: Behavior normal        Gertha Leventhal, DO

## 2022-12-24 DIAGNOSIS — E11.9 TYPE 2 DIABETES MELLITUS WITHOUT COMPLICATION, WITHOUT LONG-TERM CURRENT USE OF INSULIN (HCC): ICD-10-CM

## 2023-01-23 DIAGNOSIS — E11.9 TYPE 2 DIABETES MELLITUS WITHOUT COMPLICATION, WITHOUT LONG-TERM CURRENT USE OF INSULIN (HCC): ICD-10-CM

## 2023-01-23 RX ORDER — DAPAGLIFLOZIN 5 MG/1
TABLET, FILM COATED ORAL
Qty: 30 TABLET | Refills: 1 | Status: SHIPPED | OUTPATIENT
Start: 2023-01-23

## 2023-03-08 ENCOUNTER — LAB (OUTPATIENT)
Dept: LAB | Facility: CLINIC | Age: 53
End: 2023-03-08

## 2023-03-08 DIAGNOSIS — E11.9 TYPE 2 DIABETES MELLITUS WITHOUT COMPLICATION, WITHOUT LONG-TERM CURRENT USE OF INSULIN (HCC): ICD-10-CM

## 2023-03-08 DIAGNOSIS — Z12.5 SCREENING FOR PROSTATE CANCER: ICD-10-CM

## 2023-03-08 DIAGNOSIS — E78.5 DYSLIPIDEMIA ASSOCIATED WITH TYPE 2 DIABETES MELLITUS (HCC): ICD-10-CM

## 2023-03-08 DIAGNOSIS — I10 ESSENTIAL HYPERTENSION: ICD-10-CM

## 2023-03-08 DIAGNOSIS — E11.69 DYSLIPIDEMIA ASSOCIATED WITH TYPE 2 DIABETES MELLITUS (HCC): ICD-10-CM

## 2023-03-08 LAB
ALBUMIN SERPL BCP-MCNC: 5.1 G/DL (ref 3.5–5)
ALP SERPL-CCNC: 56 U/L (ref 34–104)
ALT SERPL W P-5'-P-CCNC: 112 U/L (ref 7–52)
ANION GAP SERPL CALCULATED.3IONS-SCNC: 11 MMOL/L (ref 4–13)
AST SERPL W P-5'-P-CCNC: 310 U/L (ref 13–39)
BILIRUB SERPL-MCNC: 1.6 MG/DL (ref 0.2–1)
BUN SERPL-MCNC: 7 MG/DL (ref 5–25)
CALCIUM SERPL-MCNC: 10.2 MG/DL (ref 8.4–10.2)
CHLORIDE SERPL-SCNC: 99 MMOL/L (ref 96–108)
CHOLEST SERPL-MCNC: 197 MG/DL
CO2 SERPL-SCNC: 30 MMOL/L (ref 21–32)
CREAT SERPL-MCNC: 0.78 MG/DL (ref 0.6–1.3)
CREAT UR-MCNC: 125 MG/DL
EST. AVERAGE GLUCOSE BLD GHB EST-MCNC: 120 MG/DL
GFR SERPL CREATININE-BSD FRML MDRD: 104 ML/MIN/1.73SQ M
GLUCOSE P FAST SERPL-MCNC: 102 MG/DL (ref 65–99)
HBA1C MFR BLD: 5.8 %
HDLC SERPL-MCNC: 55 MG/DL
LDLC SERPL CALC-MCNC: 107 MG/DL (ref 0–100)
MICROALBUMIN UR-MCNC: 112 MG/L (ref 0–20)
MICROALBUMIN/CREAT 24H UR: 90 MG/G CREATININE (ref 0–30)
POTASSIUM SERPL-SCNC: 4.1 MMOL/L (ref 3.5–5.3)
PROT SERPL-MCNC: 7.7 G/DL (ref 6.4–8.4)
PSA SERPL-MCNC: 0.3 NG/ML (ref 0–4)
SODIUM SERPL-SCNC: 140 MMOL/L (ref 135–147)
TRIGL SERPL-MCNC: 176 MG/DL

## 2023-03-10 NOTE — RESULT ENCOUNTER NOTE
Please call the patient regarding his abnormal lab results -- needs appointment soon to discuss significantly elevated LFTs      Mallika Neville, DO

## 2023-03-27 DIAGNOSIS — E11.9 TYPE 2 DIABETES MELLITUS WITHOUT COMPLICATION, WITHOUT LONG-TERM CURRENT USE OF INSULIN (HCC): ICD-10-CM

## 2023-03-27 RX ORDER — DAPAGLIFLOZIN 5 MG/1
TABLET, FILM COATED ORAL
Qty: 30 TABLET | Refills: 1 | Status: SHIPPED | OUTPATIENT
Start: 2023-03-27

## 2023-04-25 DIAGNOSIS — I10 ESSENTIAL HYPERTENSION: ICD-10-CM

## 2023-04-25 RX ORDER — LISINOPRIL 10 MG/1
TABLET ORAL
Qty: 90 TABLET | Refills: 1 | Status: SHIPPED | OUTPATIENT
Start: 2023-04-25 | End: 2023-05-02

## 2023-04-25 NOTE — TELEPHONE ENCOUNTER
Please contact patient to schedule appointment  (This cannot wait until his appointment in June )    He has had 2 no shows in the last month and I really need to see him so we can discuss some concerning lab values      Teo Gonzalez, DO

## 2023-05-02 DIAGNOSIS — I10 ESSENTIAL HYPERTENSION: ICD-10-CM

## 2023-05-02 RX ORDER — LISINOPRIL 10 MG/1
TABLET ORAL
Qty: 90 TABLET | Refills: 1 | Status: SHIPPED | OUTPATIENT
Start: 2023-05-02

## 2023-05-08 ENCOUNTER — OFFICE VISIT (OUTPATIENT)
Dept: FAMILY MEDICINE CLINIC | Facility: OTHER | Age: 53
End: 2023-05-08

## 2023-05-08 VITALS
BODY MASS INDEX: 23.4 KG/M2 | RESPIRATION RATE: 15 BRPM | WEIGHT: 158 LBS | TEMPERATURE: 98 F | SYSTOLIC BLOOD PRESSURE: 142 MMHG | DIASTOLIC BLOOD PRESSURE: 98 MMHG | HEART RATE: 97 BPM | OXYGEN SATURATION: 98 % | HEIGHT: 69 IN

## 2023-05-08 DIAGNOSIS — E78.1 ESSENTIAL HYPERTRIGLYCERIDEMIA: ICD-10-CM

## 2023-05-08 DIAGNOSIS — E11.9 TYPE 2 DIABETES MELLITUS WITHOUT COMPLICATION, WITHOUT LONG-TERM CURRENT USE OF INSULIN (HCC): ICD-10-CM

## 2023-05-08 DIAGNOSIS — I10 ESSENTIAL HYPERTENSION: ICD-10-CM

## 2023-05-08 DIAGNOSIS — R79.89 ELEVATED LFTS: Primary | ICD-10-CM

## 2023-05-08 NOTE — PROGRESS NOTES
Name: Elías Juan      : 1970      MRN: [id number]  Encounter Provider: Priya Marti DO  Encounter Date: 2023   Encounter department: [address] [place] [address] Dr Mercedes  Elevated LFTs  Assessment & Plan:  Patient has been drinking 7-54 12 oz alcoholic beverages/day for 3-4 days/week  He also has been taking tylenol twice a day for the past year for pain and numbness in hands  -US of liver ordered  -Hepatitis panel ordered  -Patient counseled on alcohol cessation  Patient claims that he is not ready to quit yet but will try to decrease his intake and will try to figure out his underlying reason for drinking    -Patient advised to stop taking tylenol daily  Rec  use of voltaren gel instead prn  Orders:  -     US abdomen complete; Future; Expected date: 2023  -     Hepatitis panel, acute; Future  -     Comprehensive metabolic panel; Future    2  Essential hypertension  Assessment & Plan:  Patient has not been checking his BP regularly at home  When he does check, he claims it is usually in the 130s  He has been taking his lisinopril daily  He denies any symptoms of high BP or low BP  Orders:  -     Comprehensive metabolic panel; Future    3  Type 2 diabetes mellitus without complication, without long-term current use of insulin (HCC)  Assessment & Plan:  Patient's A1c is 5 8, improved from before  He is not following a strict diet but does mostly eat home cooked meals, grilled chicken etc  He does not get as much exercise as he wishes to but rides his bike every weekend  Patient is compliant with his Metformin but sometimes forgets to take his [place]     -Patient to continue his metformin 500 mg BID and Farxiga 5 mg QD  Patient counseled to place an alarm to remember to take his medication  Lab Results   Component Value Date    HGBA1C 5 8 (H) 2023       Orders:  -     Ambulatory Referral to Ophthalmology;  Future  -     Comprehensive metabolic panel; Future  -     HEMOGLOBIN A1C W/ EAG ESTIMATION; Future; Expected date: 06/08/2023    4  Essential hypertriglyceridemia  Assessment & Plan:  Patient's labs were discussed  He will continue to make lifestyle modifications for diet and exercise  Return in about 6 weeks (around 6/19/2023) for Recheck liver testing  The patient indicates understanding of these issues and agrees with the plan  Subjective      Miguel Bautista 48 yom presents for f u of DM, HTN, and elevated LFTS on recent labs in March  Patient has been taking lisinopril 10 mg qd but has not been checking Bps at home  He denies any symptoms of headaches, vision changes, dizziness  Patient has been taking his metformin 500 mg BID but does sometimes forgets to take his [place]  Patient has been drinking 2-60 12 oz alcoholic beverages/day for 3-4 times a week  Patient claims drinking has always been a part of his life, although recently he changed to a different type of drink  Patient has also been taking tylenol twice a day, for the past year, for the pain and tingling in his hands  Patient denies any out of country travel  Review of Systems   Constitutional: Negative for activity change, fatigue and fever  HENT: Negative for congestion, ear pain, sinus pain and sore throat  Eyes: Negative for pain, itching and visual disturbance  Respiratory: Negative for cough and shortness of breath  Cardiovascular: Negative for chest pain, palpitations and leg swelling  Gastrointestinal: Negative for abdominal pain, constipation, diarrhea, nausea and vomiting  Endocrine: Negative for cold intolerance and heat intolerance  Genitourinary: Negative for dysuria  Musculoskeletal: Negative for myalgias  Skin: Negative for color change and rash  Neurological: Negative for dizziness, syncope, weakness and headaches  Hematological: Negative for adenopathy  Psychiatric/Behavioral: Negative for dysphoric mood   The patient is not "nervous/anxious  Current Outpatient Medications on File Prior to Visit   Medication Sig   • aspirin (ECOTRIN LOW STRENGTH) 81 mg EC tablet Take 1 tablet (81 mg total) by mouth daily   • Farxiga 5 MG TABS take 1 tablet by mouth once daily   • glucose blood test strip Test glucose daily or as directed by physician   • glucose monitoring kit (FREESTYLE) monitoring kit Test glucose daily or as directed by physician   • Lancets (FREESTYLE) lancets Test glucose daily or as directed by physician   • lisinopril (ZESTRIL) 10 mg tablet take 1 tablet by mouth once daily   • metFORMIN (GLUCOPHAGE) 500 mg tablet take 2 tablets by mouth twice a day with meals   • omeprazole (PriLOSEC) 40 MG capsule Take 1 capsule (40 mg total) by mouth daily       Objective     /98   Pulse 97   Temp 98 °F (36 7 °C)   Resp 15   Ht 5' 8 5\" (1 74 m)   Wt 71 7 kg (158 lb)   SpO2 98%   BMI 23 67 kg/m²     Physical Exam  Vitals and nursing note reviewed  Constitutional:       General: He is not in acute distress  Appearance: Normal appearance  He is well-developed  He is not ill-appearing  Comments: Body mass index is 23 67 kg/m²  HENT:      Head: Normocephalic and atraumatic  Eyes:      General: No scleral icterus  Conjunctiva/sclera: Conjunctivae normal       Pupils: Pupils are equal, round, and reactive to light  Cardiovascular:      Rate and Rhythm: Normal rate and regular rhythm  Pulses: Normal pulses  Heart sounds: Normal heart sounds  No murmur heard  Pulmonary:      Effort: Pulmonary effort is normal  No respiratory distress  Breath sounds: Normal breath sounds  Abdominal:      General: Abdomen is flat  Bowel sounds are normal  There is no distension  Palpations: Abdomen is soft  Tenderness: There is no abdominal tenderness  Comments: No hepatomegaly noted   Musculoskeletal:         General: Normal range of motion        Cervical back: Normal range of motion and neck " supple  Right lower leg: No edema  Left lower leg: No edema  Lymphadenopathy:      Cervical: No cervical adenopathy  Skin:     General: Skin is warm and dry  Coloration: Skin is not jaundiced  Findings: No rash  Neurological:      General: No focal deficit present  Mental Status: He is alert and oriented to person, place, and time  Cranial Nerves: No cranial nerve deficit     Psychiatric:         Mood and Affect: Mood normal          Behavior: Behavior normal        Josie Kaur, DO

## 2023-05-08 NOTE — ASSESSMENT & PLAN NOTE
Patient's labs were discussed  He will continue to make lifestyle modifications for diet and exercise

## 2023-05-08 NOTE — ASSESSMENT & PLAN NOTE
Patient's A1c is 5 8, improved from before  He is not following a strict diet but does mostly eat home cooked meals, grilled chicken etc  He does not get as much exercise as he wishes to but rides his bike every weekend  Patient is compliant with his Metformin but sometimes forgets to take his [place]     -Patient to continue his metformin 500 mg BID and Farxiga 5 mg QD  Patient counseled to place an alarm to remember to take his medication    Lab Results   Component Value Date    HGBA1C 5 8 (H) 03/08/2023

## 2023-05-08 NOTE — ASSESSMENT & PLAN NOTE
Patient has been drinking 3-64 12 oz alcoholic beverages/day for 3-4 days/week  He also has been taking tylenol twice a day for the past year for pain and numbness in hands  -US of liver ordered  -Hepatitis panel ordered  -Patient counseled on alcohol cessation  Patient claims that he is not ready to quit yet but will try to decrease his intake and will try to figure out his underlying reason for drinking    -Patient advised to stop taking tylenol daily  Rec  use of voltaren gel instead prn

## 2023-05-08 NOTE — ASSESSMENT & PLAN NOTE
Patient has not been checking his BP regularly at home  When he does check, he claims it is usually in the 130s  He has been taking his lisinopril daily  He denies any symptoms of high BP or low BP

## 2023-05-17 ENCOUNTER — LAB (OUTPATIENT)
Dept: LAB | Facility: CLINIC | Age: 53
End: 2023-05-17

## 2023-05-17 ENCOUNTER — HOSPITAL ENCOUNTER (OUTPATIENT)
Dept: ULTRASOUND IMAGING | Facility: HOSPITAL | Age: 53
Discharge: HOME | End: 2023-05-17
Attending: FAMILY MEDICINE

## 2023-05-17 DIAGNOSIS — R79.89 ELEVATED LFTS: ICD-10-CM

## 2023-05-17 DIAGNOSIS — I10 ESSENTIAL HYPERTENSION: ICD-10-CM

## 2023-05-17 DIAGNOSIS — E11.9 TYPE 2 DIABETES MELLITUS WITHOUT COMPLICATION, WITHOUT LONG-TERM CURRENT USE OF INSULIN (HCC): ICD-10-CM

## 2023-05-17 LAB
ALBUMIN SERPL BCP-MCNC: 4.5 G/DL (ref 3.5–5)
ALP SERPL-CCNC: 44 U/L (ref 34–104)
ALT SERPL W P-5'-P-CCNC: 34 U/L (ref 7–52)
ANION GAP SERPL CALCULATED.3IONS-SCNC: 7 MMOL/L (ref 4–13)
AST SERPL W P-5'-P-CCNC: 20 U/L (ref 13–39)
BILIRUB SERPL-MCNC: 0.71 MG/DL (ref 0.2–1)
BUN SERPL-MCNC: 16 MG/DL (ref 5–25)
CALCIUM SERPL-MCNC: 9.6 MG/DL (ref 8.4–10.2)
CHLORIDE SERPL-SCNC: 102 MMOL/L (ref 96–108)
CO2 SERPL-SCNC: 33 MMOL/L (ref 21–32)
CREAT SERPL-MCNC: 0.85 MG/DL (ref 0.6–1.3)
EST. AVERAGE GLUCOSE BLD GHB EST-MCNC: 126 MG/DL
GFR SERPL CREATININE-BSD FRML MDRD: 100 ML/MIN/1.73SQ M
GLUCOSE P FAST SERPL-MCNC: 126 MG/DL (ref 65–99)
HAV IGM SER QL: NORMAL
HBA1C MFR BLD: 6 %
HBV CORE IGM SER QL: NORMAL
HBV SURFACE AG SER QL: NORMAL
HCV AB SER QL: NORMAL
POTASSIUM SERPL-SCNC: 3.8 MMOL/L (ref 3.5–5.3)
PROT SERPL-MCNC: 6.7 G/DL (ref 6.4–8.4)
SODIUM SERPL-SCNC: 142 MMOL/L (ref 135–147)

## 2023-05-17 NOTE — LETTER
[address] [place] [address]  [address] [facility] [address]      May 25, 2023    MRN: [id number]     Phone: [phone]     Dear Mr Mihai Silva recently had a(n) Ultrasound performed on 5/17/2023 at  [address] [place] [address] that was requested by Johnathan Estevez DO  The study was reviewed by a radiologist, which is a physician who specializes in medical imaging  The radiologist issued a report describing his or her findings  In that report there was a finding that the radiologist felt warranted further discussion with your health care provider and that discussion would be beneficial to you  The results were sent to Johnathan Estevez DO on 05/20/2023 12:15 PM  We recommend that you contact Johnathan Estevez DO at [phone] or set up an appointment to discuss the results of the imaging test  If you have already heard from Johnathan Estevez DO regarding the results of your study, you can disregard this letter  This letter is not meant to alarm you, but intended to encourage you to follow-up on your results with the provider that sent you for the imaging study  In addition, we have enclosed answers to frequently asked questions by other patients who have also received a letter to review results with their health care provider (see page two)  Thank you for choosing [address] [place] [address] for your medical imaging needs  FREQUENTLY ASKED QUESTIONS    Why am I receiving this letter? [facility] requires us to notify patients who have findings on imaging exams that may require more testing or follow-up with a health professional within the next 3 months  How serious is the finding on the imaging test?  This letter is sent to all patients who may need follow-up or more testing within the next 3 months  Receiving this letter does not necessarily mean you have a life-threatening imaging finding or disease  Recommendations in the radiologist’s imaging report are general in nature and it is up to your healthcare provider to say whether those recommendations make sense for your situation  You are strongly encouraged to talk to your health care provider about the results and ask whether additional steps need to be taken  Where can I get a copy of the final report for my recent radiology exam?  To get a full copy of the report you can access your records online at [email or web address] or please contact [facility] at [phone] Monday through Friday between 8 am and 6 pm          What do I need to do now? Please contact your health care provider who requested the imaging study to discuss what further actions (if any) are needed  You may have already heard from (your ordering provider) in regard to this test in which case you can disregard this letter  NOTICE IN ACCORDANCE WITH THE [place] STATE “PATIENT TEST RESULT INFORMATION ACT OF 2018”    You are receiving this notice as a result of a determination by your diagnostic imaging service that further discussions of your test results are warranted and would be beneficial to you  The complete results of your test or tests have been or will be sent to the health care practitioner that ordered the test or tests  It is recommended that you contact your health care practitioner to discuss your results as soon as possible

## 2023-05-18 ENCOUNTER — TELEPHONE (OUTPATIENT)
Dept: FAMILY MEDICINE CLINIC | Facility: OTHER | Age: 53
End: 2023-05-18

## 2023-05-25 DIAGNOSIS — K86.89 DILATED PANCREATIC DUCT: Primary | ICD-10-CM

## 2023-05-25 NOTE — RESULT ENCOUNTER NOTE
Ignacio Jackson,    Fortunately, your RUQ US did not show any evidence of cirrhosis  Findings suggestive of fatty liver were noted (very common, we usually just monitor)  The radiologist did note a significant dilation in your pancreatic duct and recommends follow up imaging to further assess  I have placed an order in the system for a CT of your abdomen  Please schedule this at your earliest convenience  I'll be in touch when I see the follow up imaging results      Dr Hill Certain

## 2023-05-26 DIAGNOSIS — E11.9 TYPE 2 DIABETES MELLITUS WITHOUT COMPLICATION, WITHOUT LONG-TERM CURRENT USE OF INSULIN (HCC): ICD-10-CM

## 2023-05-26 RX ORDER — DAPAGLIFLOZIN 5 MG/1
TABLET, FILM COATED ORAL
Qty: 30 TABLET | Refills: 1 | Status: SHIPPED | OUTPATIENT
Start: 2023-05-26

## 2023-06-14 ENCOUNTER — HOSPITAL ENCOUNTER (OUTPATIENT)
Dept: CT IMAGING | Facility: HOSPITAL | Age: 53
Discharge: HOME | End: 2023-06-14
Attending: FAMILY MEDICINE
Payer: COMMERCIAL

## 2023-06-14 DIAGNOSIS — K86.89 DILATED PANCREATIC DUCT: ICD-10-CM

## 2023-06-14 PROCEDURE — G1004 CDSM NDSC: HCPCS

## 2023-06-14 PROCEDURE — 74177 CT ABD & PELVIS W/CONTRAST: CPT

## 2023-06-14 RX ADMIN — IOHEXOL 100 ML: 350 INJECTION, SOLUTION INTRAVENOUS at 16:40

## 2023-06-22 ENCOUNTER — OFFICE VISIT (OUTPATIENT)
Dept: FAMILY MEDICINE CLINIC | Facility: OTHER | Age: 53
End: 2023-06-22
Payer: COMMERCIAL

## 2023-06-22 VITALS
SYSTOLIC BLOOD PRESSURE: 126 MMHG | TEMPERATURE: 98 F | WEIGHT: 158.4 LBS | DIASTOLIC BLOOD PRESSURE: 92 MMHG | HEART RATE: 96 BPM | BODY MASS INDEX: 24.01 KG/M2 | OXYGEN SATURATION: 98 % | RESPIRATION RATE: 18 BRPM | HEIGHT: 68 IN

## 2023-06-22 DIAGNOSIS — E11.9 TYPE 2 DIABETES MELLITUS WITHOUT COMPLICATION, WITHOUT LONG-TERM CURRENT USE OF INSULIN (HCC): ICD-10-CM

## 2023-06-22 DIAGNOSIS — K86.0 ALCOHOL-INDUCED CHRONIC PANCREATITIS (HCC): Primary | ICD-10-CM

## 2023-06-22 DIAGNOSIS — K22.70 BARRETT'S ESOPHAGUS WITHOUT DYSPLASIA: ICD-10-CM

## 2023-06-22 DIAGNOSIS — K21.00 GASTROESOPHAGEAL REFLUX DISEASE WITH ESOPHAGITIS WITHOUT HEMORRHAGE: ICD-10-CM

## 2023-06-22 PROCEDURE — 99213 OFFICE O/P EST LOW 20 MIN: CPT | Performed by: FAMILY MEDICINE

## 2023-06-22 RX ORDER — OMEPRAZOLE 40 MG/1
40 CAPSULE, DELAYED RELEASE ORAL DAILY
Qty: 90 CAPSULE | Refills: 1 | Status: SHIPPED | OUTPATIENT
Start: 2023-06-22

## 2023-06-22 NOTE — PATIENT INSTRUCTIONS
Pancreatitis   AMBULATORY CARE:   Pancreatitis  is inflammation of your pancreas  The pancreas is an organ that makes insulin  It also makes enzymes (digestive juices) that help your body digest food  Pancreatitis may be an acute (short-term) problem that happens only once  It may become a chronic (long-term) problem that comes and goes over time  Common symptoms include the following:   Severe burning, stabbing, or aching pain that starts in the top of your abdomen and spreads to your back    Fever    Nausea and vomiting    Abdomen that is tender to the touch    Weight loss without trying    Call your doctor if:   You have severe pain in your abdomen and you are vomiting  You have a fever  You continue to lose weight without trying  Your skin or the whites of your eyes turn yellow  You have questions or concerns about your condition or care  Treatment  depends on the cause of your pancreatitis  You may need to stay in the hospital for treatment and more tests  Medicines:      Prescription pain medicine  may be given  Ask your healthcare provider how to take this medicine safely  Some prescription pain medicines contain acetaminophen  Do not take other medicines that contain acetaminophen without talking to your healthcare provider  Too much acetaminophen may cause liver damage  Prescription pain medicine may cause constipation  Ask your healthcare provider how to prevent or treat constipation  Antibiotics  may be given to treat a bacterial infection  Endoscopic retrograde cholangiopancreatography (ERCP)  is a procedure used to open ducts blocked by pancreatic duct stones  Surgery  may be needed to open or widen blocked bile ducts or drain fluid from your pancreas  Your gallbladder may need to be removed if gallstones are causing your pancreatitis  Self-care:   Rest  when you feel it is needed  Slowly start to do more each day  Return to your usual activities as directed      Do not drink any alcohol  If you need help to stop drinking, contact the following organization:   Alcoholics Anonymous  Web Address: http://www morales info/      Ask your healthcare provider or dietitian about the best foods to eat  You may need to eat foods that are low in fat if you have chronic pancreatitis  Do not smoke  Nicotine and other chemicals in cigarettes and cigars can cause damage  Ask your healthcare provider for information if you currently smoke and need help to quit  E-cigarettes or smokeless tobacco still contain nicotine  Talk to your healthcare provider before you use these products  Follow up with your doctor as directed:  Write down your questions so you remember to ask them during your visits  © Copyright [employer] 2022 Information is for End User's use only and may not be sold, redistributed or otherwise used for commercial purposes  The above information is an  only  It is not intended as medical advice for individual conditions or treatments  Talk to your doctor, nurse or pharmacist before following any medical regimen to see if it is safe and effective for you

## 2023-06-23 NOTE — ASSESSMENT & PLAN NOTE
Noted on 6/14/23 CT A/P  EtOH likely inciting factor  Reassurance provided along with educational materials on s/s of pancreatitis  He has been encouraged to follow healthy diet and lifestyle (applauded for his efforts at cutting back on EtOH)  Consider follow up imaging in 6-12 months to ensure stability

## 2023-06-23 NOTE — PROGRESS NOTES
Name: Diogenes Saavedra      : 1970      MRN: [id number]  Encounter Provider: Liz Zelaya DO  Encounter Date: 2023   Encounter department: [address] [place] [address] Dr Mercedes  Alcohol-induced chronic pancreatitis [facility])  Assessment & Plan:  Noted on 23 CT A/P  EtOH likely inciting factor  Reassurance provided along with educational materials on s/s of pancreatitis  He has been encouraged to follow healthy diet and lifestyle (applauded for his efforts at cutting back on EtOH)  Consider follow up imaging in 6-12 months to ensure stability    Orders:  -     Comprehensive metabolic panel; Future; Expected date: 2023    2  Type 2 diabetes mellitus without complication, without long-term current use of insulin (HCC)  Comments:      Assessment & Plan:  Improved  Continue metformin and Farxiga    Lab Results   Component Value Date    HGBA1C 6 0 (H) 2023       Orders:  -     metFORMIN (GLUCOPHAGE) 500 mg tablet; Take 2 tablets (1,000 mg total) by mouth 2 (two) times a day with meals  -     Albumin / creatinine urine ratio; Future; Expected date: 2023  -     Comprehensive metabolic panel; Future; Expected date: 2023  -     Hemoglobin A1C; Future; Expected date: 2023  -     Lipid Panel with Direct LDL reflex; Future; Expected date: 2023    3  Gastroesophageal reflux disease with esophagitis without hemorrhage  -     omeprazole (PriLOSEC) 40 MG capsule; Take 1 capsule (40 mg total) by mouth daily    4  Carter's esophagus without dysplasia  -     omeprazole (PriLOSEC) 40 MG capsule; Take 1 capsule (40 mg total) by mouth daily          Return in about 3 months (around 2023) for Recheck T2DM, labs due prior  The patient indicates understanding of these issues and agrees with the plan          Subjective      HPI     Pt presents for f/u to liver imaging performed due to sudden, substantial increase in LFTs (2023)  He reports feeling well "at this time  Since last visit, he has cut back on alcohol consumption significantly  He denies any abdominal pain, n/v, change in bowel habits  His CT Abdomen/Pelvis was done 6/14 -- testing ordered b/c RUQ US showed dilation of the pancreatic duct  He is also requesting lab orders to be placed ahead of his next visit      Review of Systems   Constitutional: Negative for activity change, fatigue and fever  HENT: Negative for congestion, ear pain, sinus pain and sore throat  Eyes: Negative for pain, itching and visual disturbance  Respiratory: Negative for cough and shortness of breath  Cardiovascular: Negative for chest pain, palpitations and leg swelling  Gastrointestinal: Negative for abdominal pain, constipation, diarrhea, nausea and vomiting  Endocrine: Negative for cold intolerance and heat intolerance  Genitourinary: Negative for dysuria  Musculoskeletal: Negative for myalgias  Skin: Negative for color change and rash  Neurological: Negative for dizziness, syncope and headaches  Hematological: Negative for adenopathy  Psychiatric/Behavioral: Negative for dysphoric mood  The patient is not nervous/anxious  Current Outpatient Medications on File Prior to Visit   Medication Sig   • aspirin (ECOTRIN LOW STRENGTH) 81 mg EC tablet Take 1 tablet (81 mg total) by mouth daily   • Farxiga 5 MG TABS take 1 tablet by mouth once daily   • glucose blood test strip Test glucose daily or as directed by physician   • glucose monitoring kit (FREESTYLE) monitoring kit Test glucose daily or as directed by physician   • Lancets (FREESTYLE) lancets Test glucose daily or as directed by physician   • lisinopril (ZESTRIL) 10 mg tablet take 1 tablet by mouth once daily       Objective     /92   Pulse 96   Temp 98 °F (36 7 °C)   Resp 18   Ht 5' 8\" (1 727 m)   Wt 71 8 kg (158 lb 6 4 oz)   SpO2 98%   BMI 24 08 kg/m²     Physical Exam  Vitals and nursing note reviewed     Constitutional:       " General: He is not in acute distress  Appearance: Normal appearance  He is well-developed  He is not ill-appearing  Comments: Body mass index is 24 08 kg/m²  HENT:      Head: Normocephalic and atraumatic  Eyes:      General: No scleral icterus  Conjunctiva/sclera: Conjunctivae normal       Pupils: Pupils are equal, round, and reactive to light  Cardiovascular:      Rate and Rhythm: Normal rate and regular rhythm  Heart sounds: Normal heart sounds  No murmur heard  Pulmonary:      Effort: Pulmonary effort is normal  No respiratory distress  Breath sounds: Normal breath sounds  Abdominal:      General: Bowel sounds are normal  There is no distension  Palpations: Abdomen is soft  Tenderness: There is no abdominal tenderness  Musculoskeletal:         General: Normal range of motion  Cervical back: Normal range of motion and neck supple  Right lower leg: No edema  Left lower leg: No edema  Lymphadenopathy:      Cervical: No cervical adenopathy  Skin:     General: Skin is warm and dry  Coloration: Skin is not jaundiced  Findings: No rash  Neurological:      General: No focal deficit present  Mental Status: He is alert and oriented to person, place, and time     Psychiatric:         Mood and Affect: Mood normal          Behavior: Behavior normal        Josie Kaur, DO

## 2023-06-23 NOTE — ASSESSMENT & PLAN NOTE
Improved  Continue metformin and Farxiga    Lab Results   Component Value Date    HGBA1C 6 0 (H) 05/17/2023

## 2023-07-23 DIAGNOSIS — E11.9 TYPE 2 DIABETES MELLITUS WITHOUT COMPLICATION, WITHOUT LONG-TERM CURRENT USE OF INSULIN (HCC): ICD-10-CM

## 2023-07-24 RX ORDER — DAPAGLIFLOZIN 5 MG/1
TABLET, FILM COATED ORAL
Qty: 30 TABLET | Refills: 1 | Status: SHIPPED | OUTPATIENT
Start: 2023-07-24

## 2023-10-03 DIAGNOSIS — E11.9 TYPE 2 DIABETES MELLITUS WITHOUT COMPLICATION, WITHOUT LONG-TERM CURRENT USE OF INSULIN (HCC): ICD-10-CM

## 2023-10-04 RX ORDER — DAPAGLIFLOZIN 5 MG/1
TABLET, FILM COATED ORAL
Qty: 30 TABLET | Refills: 1 | Status: SHIPPED | OUTPATIENT
Start: 2023-10-04

## 2023-10-25 DIAGNOSIS — E11.9 TYPE 2 DIABETES MELLITUS WITHOUT COMPLICATION, WITHOUT LONG-TERM CURRENT USE OF INSULIN (HCC): ICD-10-CM

## 2023-10-25 RX ORDER — DAPAGLIFLOZIN 5 MG/1
TABLET, FILM COATED ORAL
Qty: 30 TABLET | Refills: 1 | Status: SHIPPED | OUTPATIENT
Start: 2023-10-25 | End: 2023-10-25 | Stop reason: SDUPTHER

## 2023-10-25 NOTE — TELEPHONE ENCOUNTER
How did this Franck Mccoy [birth year] phone number [phone] need a force forcible for Candace Turner or whatever it is called in on. Don't have any more prescriptions. Also, my doctor's appointment was cancelled. You guys cancelled it. I believe it was the 16th and we need to reschedule. Talk to you soon.  Bye.

## 2023-11-01 ENCOUNTER — LAB (OUTPATIENT)
Dept: LAB | Facility: CLINIC | Age: 53
End: 2023-11-01
Payer: COMMERCIAL

## 2023-11-01 DIAGNOSIS — E11.9 TYPE 2 DIABETES MELLITUS WITHOUT COMPLICATION, WITHOUT LONG-TERM CURRENT USE OF INSULIN (HCC): ICD-10-CM

## 2023-11-01 DIAGNOSIS — K86.0 ALCOHOL-INDUCED CHRONIC PANCREATITIS (HCC): ICD-10-CM

## 2023-11-01 LAB
ALBUMIN SERPL BCP-MCNC: 4.5 G/DL (ref 3.5–5)
ALP SERPL-CCNC: 44 U/L (ref 34–104)
ALT SERPL W P-5'-P-CCNC: 59 U/L (ref 7–52)
ANION GAP SERPL CALCULATED.3IONS-SCNC: 4 MMOL/L
AST SERPL W P-5'-P-CCNC: 33 U/L (ref 13–39)
BILIRUB SERPL-MCNC: 0.67 MG/DL (ref 0.2–1)
BUN SERPL-MCNC: 12 MG/DL (ref 5–25)
CALCIUM SERPL-MCNC: 9.6 MG/DL (ref 8.4–10.2)
CHLORIDE SERPL-SCNC: 106 MMOL/L (ref 96–108)
CHOLEST SERPL-MCNC: 183 MG/DL
CO2 SERPL-SCNC: 32 MMOL/L (ref 21–32)
CREAT SERPL-MCNC: 0.68 MG/DL (ref 0.6–1.3)
CREAT UR-MCNC: 188.1 MG/DL
EST. AVERAGE GLUCOSE BLD GHB EST-MCNC: 143 MG/DL
GFR SERPL CREATININE-BSD FRML MDRD: 110 ML/MIN/1.73SQ M
GLUCOSE P FAST SERPL-MCNC: 142 MG/DL (ref 65–99)
HBA1C MFR BLD: 6.6 %
HDLC SERPL-MCNC: 51 MG/DL
LDLC SERPL CALC-MCNC: 98 MG/DL (ref 0–100)
MICROALBUMIN UR-MCNC: 34.7 MG/L
MICROALBUMIN/CREAT 24H UR: 18 MG/G CREATININE (ref 0–30)
POTASSIUM SERPL-SCNC: 3.6 MMOL/L (ref 3.5–5.3)
PROT SERPL-MCNC: 6.8 G/DL (ref 6.4–8.4)
SODIUM SERPL-SCNC: 142 MMOL/L (ref 135–147)
TRIGL SERPL-MCNC: 168 MG/DL

## 2023-11-01 PROCEDURE — 82043 UR ALBUMIN QUANTITATIVE: CPT

## 2023-11-01 PROCEDURE — 83036 HEMOGLOBIN GLYCOSYLATED A1C: CPT

## 2023-11-01 PROCEDURE — 80061 LIPID PANEL: CPT

## 2023-11-01 PROCEDURE — 36415 COLL VENOUS BLD VENIPUNCTURE: CPT

## 2023-11-01 PROCEDURE — 82570 ASSAY OF URINE CREATININE: CPT

## 2023-11-01 PROCEDURE — 80053 COMPREHEN METABOLIC PANEL: CPT

## 2023-11-07 ENCOUNTER — TELEPHONE (OUTPATIENT)
Dept: FAMILY MEDICINE CLINIC | Facility: OTHER | Age: 53
End: 2023-11-07

## 2023-11-07 NOTE — TELEPHONE ENCOUNTER
Lm for pt to call back office to schedule appt to go over recent labs and continue med management/refills

## 2023-11-07 NOTE — RESULT ENCOUNTER NOTE
APPOINTMENT NEEDED  Pt no-showed for appointment on 11/2 (and cancelled several just prior to this).     Cary Oleary, DO

## 2023-11-07 NOTE — TELEPHONE ENCOUNTER
----- Message from Cary Oleary DO sent at 11/7/2023  8:14 AM EST -----  APPOINTMENT NEEDED  Pt no-showed for appointment on 11/2 (and cancelled several just prior to this).     Cary Oleary DO

## 2023-11-09 NOTE — RESULT ENCOUNTER NOTE
Zola Najjar,    Please call the office to reschedule your missed appointment from 11/2.     Thank you,  Dr Vanessa Rodriguez

## 2023-11-20 ENCOUNTER — OFFICE VISIT (OUTPATIENT)
Dept: FAMILY MEDICINE CLINIC | Facility: OTHER | Age: 53
End: 2023-11-20
Payer: COMMERCIAL

## 2023-11-20 VITALS
RESPIRATION RATE: 15 BRPM | OXYGEN SATURATION: 97 % | HEIGHT: 68 IN | TEMPERATURE: 98.4 F | BODY MASS INDEX: 23.49 KG/M2 | DIASTOLIC BLOOD PRESSURE: 86 MMHG | WEIGHT: 155 LBS | HEART RATE: 105 BPM | SYSTOLIC BLOOD PRESSURE: 126 MMHG

## 2023-11-20 DIAGNOSIS — R10.9 LEFT FLANK PAIN, CHRONIC: ICD-10-CM

## 2023-11-20 DIAGNOSIS — I10 ESSENTIAL HYPERTENSION: ICD-10-CM

## 2023-11-20 DIAGNOSIS — E78.5 DYSLIPIDEMIA ASSOCIATED WITH TYPE 2 DIABETES MELLITUS: ICD-10-CM

## 2023-11-20 DIAGNOSIS — Z23 ENCOUNTER FOR IMMUNIZATION: ICD-10-CM

## 2023-11-20 DIAGNOSIS — G89.29 LEFT FLANK PAIN, CHRONIC: ICD-10-CM

## 2023-11-20 DIAGNOSIS — E11.69 DYSLIPIDEMIA ASSOCIATED WITH TYPE 2 DIABETES MELLITUS: ICD-10-CM

## 2023-11-20 DIAGNOSIS — K86.0 ALCOHOL-INDUCED CHRONIC PANCREATITIS (HCC): ICD-10-CM

## 2023-11-20 DIAGNOSIS — E11.9 TYPE 2 DIABETES MELLITUS WITHOUT COMPLICATION, WITHOUT LONG-TERM CURRENT USE OF INSULIN (HCC): Primary | ICD-10-CM

## 2023-11-20 DIAGNOSIS — M25.542 JOINT PAIN IN FINGERS OF LEFT HAND: ICD-10-CM

## 2023-11-20 PROCEDURE — 90471 IMMUNIZATION ADMIN: CPT

## 2023-11-20 PROCEDURE — 99214 OFFICE O/P EST MOD 30 MIN: CPT | Performed by: FAMILY MEDICINE

## 2023-11-20 PROCEDURE — 90686 IIV4 VACC NO PRSV 0.5 ML IM: CPT

## 2023-11-20 NOTE — ASSESSMENT & PLAN NOTE
Slight increase in A1c -- recommend continued use of metformin + farxiga as prescribed  Closer dietary control  Recheck labs in 3-4 months  Lab Results   Component Value Date    HGBA1C 6.6 (H) 11/01/2023

## 2023-11-20 NOTE — PROGRESS NOTES
Name: Virginia Jonas      : 1970      MRN: [id number]  Encounter Provider: Geraldo Willson DO  Encounter Date: 2023   Encounter department: [address]     1. Type 2 diabetes mellitus without complication, without long-term current use of insulin (HCC)  Assessment & Plan:  Slight increase in A1c -- recommend continued use of metformin + farxiga as prescribed  Closer dietary control  Recheck labs in 3-4 months  Lab Results   Component Value Date    HGBA1C 6.6 (H) 2023         2. Essential hypertension  Assessment & Plan:  BP goal <140/90  Continue lisinopril      3. Dyslipidemia associated with type 2 diabetes mellitus   Assessment & Plan:  LDL goal <100  Continue LM as management  ASCVD Risk 7.2% -- may consider statin in near future  Lab Results   Component Value Date    HGBA1C 6.6 (H) 2023         4. Alcohol-induced chronic pancreatitis ([address])  Assessment & Plan:  Currently asymptomatic although does c/o pain under left lower ribs/left flank  LFTs are stable  Recommend he obtain the follow up CT abdomen/pelvis that was discussed at last visit    Orders:  -     CT abdomen pelvis w contrast; Future; Expected date: 2023    5. Encounter for immunization  -     influenza vaccine, quadrivalent, 0.5 mL, preservative-free, for adult and pediatric patients 6 mos+ (AFLURIA, FLUARIX, FLULAVAL, FLUZONE)    6. Left flank pain, chronic  -     CT abdomen pelvis w contrast; Future; Expected date: 2023    7. Joint pain in fingers of left hand  -     XR hand 3+ vw left; Future; Expected date: 2023        Depression Screening and Follow-up Plan: Patient was screened for depression during today's encounter. They screened negative with a PHQ-2 score of 0. Return in about 6 weeks (around 2024) for Annual physical.       Subjective      Diabetes  He presents for his follow-up diabetic visit. He has type 2 diabetes mellitus.  The initial diagnosis of diabetes was made 4 years ago. His disease course has been stable. There are no hypoglycemic associated symptoms. Pertinent negatives for hypoglycemia include no dizziness, headaches, nervousness/anxiousness or sweats. Associated symptoms include weight loss. Pertinent negatives for diabetes include no blurred vision, no chest pain, no fatigue, no foot paresthesias, no foot ulcerations, no polydipsia, no polyphagia, no polyuria, no visual change and no weakness. There are no hypoglycemic complications. Symptoms are stable. There are no diabetic complications. Pertinent negatives for diabetic complications include no CVA, PVD or retinopathy. Risk factors for coronary artery disease include diabetes mellitus, dyslipidemia, family history, hypertension, male sex and stress. Current diabetic treatment includes diet and oral agent (dual therapy). He is compliant with treatment all of the time. His weight is decreasing steadily. He is following a generally healthy diet. Meal planning includes avoidance of concentrated sweets. He has had a previous visit with a dietitian. He participates in exercise intermittently. An ACE inhibitor/angiotensin II receptor blocker is being taken. He does not see a podiatrist.Eye exam is current. Hypertension  This is a chronic problem. The current episode started more than 1 year ago. The problem has been waxing and waning since onset. The problem is controlled. Pertinent negatives include no anxiety, blurred vision, chest pain, headaches, malaise/fatigue, neck pain, orthopnea, palpitations, peripheral edema, PND, shortness of breath or sweats. There are no associated agents to hypertension. Risk factors for coronary artery disease include diabetes mellitus, dyslipidemia, family history, male gender and stress. Past treatments include ACE inhibitors. The current treatment provides moderate improvement. Compliance problems include diet, exercise and psychosocial issues.   There is no history of angina, kidney disease, CAD/MI, CVA, heart failure, left ventricular hypertrophy, PVD or retinopathy. Identifiable causes of hypertension include sleep apnea. There is no history of chronic renal disease, a hypertension causing med or a thyroid problem. Hyperlipidemia  This is a chronic problem. The current episode started more than 1 year ago. The problem is controlled. Exacerbating diseases include diabetes. He has no history of chronic renal disease, hypothyroidism, liver disease, obesity or nephrotic syndrome. There are no known factors aggravating his hyperlipidemia. Pertinent negatives include no chest pain, focal sensory loss, focal weakness, leg pain, myalgias or shortness of breath. Current antihyperlipidemic treatment includes diet change. The current treatment provides moderate improvement of lipids. Compliance problems include adherence to diet, adherence to exercise and psychosocial issues. Risk factors for coronary artery disease include diabetes mellitus, dyslipidemia, family history, hypertension, male sex and stress. Flank Pain  This is a chronic problem. The current episode started more than 1 month ago. The problem occurs constantly. The problem has been waxing and waning since onset. Pain location: left lateral ribs (lower) The quality of the pain is described as aching. Radiates to: left lower back. The pain is moderate. The pain is Worse during the night. The symptoms are aggravated by lying down. Associated symptoms include weight loss. Pertinent negatives include no abdominal pain, bladder incontinence, bowel incontinence, chest pain, dysuria, fever, headaches, leg pain, numbness, paresis, paresthesias, pelvic pain, perianal numbness, tingling or weakness. He has tried nothing for the symptoms. Review of Systems   Constitutional:  Positive for weight loss. Negative for activity change, fatigue, fever and malaise/fatigue.    HENT:  Negative for congestion, ear pain, sinus pain and sore throat. Eyes:  Negative for blurred vision, pain, itching and visual disturbance. Respiratory:  Negative for cough and shortness of breath. Cardiovascular:  Negative for chest pain, palpitations, orthopnea, leg swelling and PND. Gastrointestinal:  Negative for abdominal pain, bowel incontinence, constipation, diarrhea, nausea and vomiting. Endocrine: Negative for cold intolerance, heat intolerance, polydipsia, polyphagia and polyuria. Genitourinary:  Positive for flank pain. Negative for bladder incontinence, dysuria and pelvic pain. Musculoskeletal:  Positive for arthralgias (hands). Negative for myalgias and neck pain. Skin:  Negative for color change and rash. Neurological:  Negative for dizziness, tingling, focal weakness, syncope, weakness, numbness, headaches and paresthesias. Hematological:  Negative for adenopathy. Psychiatric/Behavioral:  Negative for dysphoric mood. The patient is not nervous/anxious. Current Outpatient Medications on File Prior to Visit   Medication Sig    aspirin (ECOTRIN LOW STRENGTH) 81 mg EC tablet Take 1 tablet (81 mg total) by mouth daily    dapagliflozin (Farxiga) 5 MG TABS Take 1 tablet (5 mg total) by mouth daily    glucose blood test strip Test glucose daily or as directed by physician    glucose monitoring kit (FREESTYLE) monitoring kit Test glucose daily or as directed by physician    Lancets (FREESTYLE) lancets Test glucose daily or as directed by physician    lisinopril (ZESTRIL) 10 mg tablet take 1 tablet by mouth once daily    metFORMIN (GLUCOPHAGE) 500 mg tablet Take 2 tablets (1,000 mg total) by mouth 2 (two) times a day with meals    omeprazole (PriLOSEC) 40 MG capsule Take 1 capsule (40 mg total) by mouth daily       Objective     /86   Pulse 105   Temp 98.4 °F (36.9 °C)   Resp 15   Ht 5' 8" (1.727 m)   Wt 70.3 kg (155 lb)   SpO2 97%   BMI 23.57 kg/m²     Physical Exam  Vitals and nursing note reviewed. Constitutional:       General: He is not in acute distress. Appearance: Normal appearance. He is well-developed. He is not ill-appearing. Comments: Body mass index is 23.57 kg/m². HENT:      Head: Normocephalic and atraumatic. Eyes:      General: No scleral icterus. Conjunctiva/sclera: Conjunctivae normal.      Pupils: Pupils are equal, round, and reactive to light. Cardiovascular:      Rate and Rhythm: Normal rate and regular rhythm. Pulses: no weak pulses          Dorsalis pedis pulses are 2+ on the right side and 2+ on the left side. Posterior tibial pulses are 2+ on the right side and 2+ on the left side. Heart sounds: Normal heart sounds. No murmur heard. Pulmonary:      Effort: Pulmonary effort is normal. No respiratory distress. Breath sounds: Normal breath sounds. Abdominal:      General: Bowel sounds are normal. There is no distension. Palpations: Abdomen is soft. Tenderness: There is no abdominal tenderness. Musculoskeletal:         General: No deformity. Normal range of motion. Cervical back: Normal range of motion and neck supple. Right lower leg: No edema. Left lower leg: No edema. Feet:      Right foot:      Skin integrity: No ulcer, skin breakdown, erythema, warmth, callus or dry skin. Left foot:      Skin integrity: No ulcer, skin breakdown, erythema, warmth, callus or dry skin. Lymphadenopathy:      Cervical: No cervical adenopathy. Skin:     General: Skin is warm and dry. Coloration: Skin is not jaundiced. Findings: No rash. Neurological:      General: No focal deficit present. Mental Status: He is alert and oriented to person, place, and time. Cranial Nerves: No cranial nerve deficit. Psychiatric:         Mood and Affect: Mood normal.         Behavior: Behavior normal.     Patient's shoes and socks removed. Right Foot/Ankle   Right Foot Inspection  Skin Exam: skin normal and skin intact. No dry skin, no warmth, no callus, no erythema, no maceration, no abnormal color, no pre-ulcer, no ulcer and no callus. Toe Exam: ROM and strength within normal limits. Sensory   Vibration: intact  Proprioception: intact  Monofilament testing: intact    Vascular  Capillary refills: < 3 seconds  The right DP pulse is 2+. The right PT pulse is 2+. Left Foot/Ankle  Left Foot Inspection  Skin Exam: skin normal and skin intact. No dry skin, no warmth, no erythema, no maceration, normal color, no pre-ulcer, no ulcer and no callus. Toe Exam: ROM and strength within normal limits. Sensory   Vibration: intact  Proprioception: intact  Monofilament testing: intact    Vascular  Capillary refills: < 3 seconds  The left DP pulse is 2+. The left PT pulse is 2+.      Assign Risk Category  No deformity present  No loss of protective sensation  No weak pulses  Risk: 0      Josie Kaur,

## 2023-11-20 NOTE — ASSESSMENT & PLAN NOTE
Currently asymptomatic although does c/o pain under left lower ribs/left flank  LFTs are stable  Recommend he obtain the follow up CT abdomen/pelvis that was discussed at last visit

## 2023-11-20 NOTE — ASSESSMENT & PLAN NOTE
LDL goal <100  Continue LM as management  ASCVD Risk 7.2% -- may consider statin in near future  Lab Results   Component Value Date    HGBA1C 6.6 (H) 11/01/2023

## 2023-11-30 ENCOUNTER — HOSPITAL ENCOUNTER (OUTPATIENT)
Dept: CT IMAGING | Facility: HOSPITAL | Age: 53
End: 2023-11-30
Payer: COMMERCIAL

## 2023-11-30 DIAGNOSIS — K86.0 ALCOHOL-INDUCED CHRONIC PANCREATITIS (HCC): ICD-10-CM

## 2023-11-30 DIAGNOSIS — R10.9 LEFT FLANK PAIN, CHRONIC: ICD-10-CM

## 2023-11-30 DIAGNOSIS — G89.29 LEFT FLANK PAIN, CHRONIC: ICD-10-CM

## 2023-11-30 PROCEDURE — 74177 CT ABD & PELVIS W/CONTRAST: CPT

## 2023-11-30 PROCEDURE — G1004 CDSM NDSC: HCPCS

## 2023-11-30 RX ADMIN — IOHEXOL 85 ML: 350 INJECTION, SOLUTION INTRAVENOUS at 07:48

## 2023-12-01 DIAGNOSIS — E11.9 TYPE 2 DIABETES MELLITUS WITHOUT COMPLICATION, WITHOUT LONG-TERM CURRENT USE OF INSULIN (HCC): ICD-10-CM

## 2023-12-01 DIAGNOSIS — I10 ESSENTIAL HYPERTENSION: ICD-10-CM

## 2023-12-01 DIAGNOSIS — K22.70 BARRETT'S ESOPHAGUS WITHOUT DYSPLASIA: ICD-10-CM

## 2023-12-01 DIAGNOSIS — K21.00 GASTROESOPHAGEAL REFLUX DISEASE WITH ESOPHAGITIS WITHOUT HEMORRHAGE: ICD-10-CM

## 2023-12-01 RX ORDER — LISINOPRIL 10 MG/1
10 TABLET ORAL DAILY
Qty: 90 TABLET | Refills: 1 | Status: SHIPPED | OUTPATIENT
Start: 2023-12-01

## 2023-12-01 RX ORDER — OMEPRAZOLE 40 MG/1
40 CAPSULE, DELAYED RELEASE ORAL DAILY
Qty: 90 CAPSULE | Refills: 1 | Status: SHIPPED | OUTPATIENT
Start: 2023-12-01

## 2023-12-08 ENCOUNTER — TELEPHONE (OUTPATIENT)
Dept: FAMILY MEDICINE CLINIC | Facility: OTHER | Age: 53
End: 2023-12-08

## 2023-12-08 NOTE — RESULT ENCOUNTER NOTE
Please inform pt that CT abdomen/pelvis is stable. No new findings. He should make a follow up appointment if he continues to have LUQ pain.     Ryan Arango, DO

## 2023-12-08 NOTE — TELEPHONE ENCOUNTER
----- Message from Stanford Flood DO sent at 12/8/2023 11:51 AM EST -----  Please inform pt that CT abdomen/pelvis is stable. No new findings. He should make a follow up appointment if he continues to have LUQ pain.     Stanford Flood DO

## 2023-12-28 DIAGNOSIS — E11.9 TYPE 2 DIABETES MELLITUS WITHOUT COMPLICATION, WITHOUT LONG-TERM CURRENT USE OF INSULIN (HCC): ICD-10-CM

## 2023-12-28 DIAGNOSIS — K21.00 GASTROESOPHAGEAL REFLUX DISEASE WITH ESOPHAGITIS WITHOUT HEMORRHAGE: ICD-10-CM

## 2023-12-28 DIAGNOSIS — K22.70 BARRETT'S ESOPHAGUS WITHOUT DYSPLASIA: ICD-10-CM

## 2023-12-28 RX ORDER — DAPAGLIFLOZIN 5 MG/1
5 TABLET, FILM COATED ORAL DAILY
Qty: 30 TABLET | Refills: 1 | Status: SHIPPED | OUTPATIENT
Start: 2023-12-28

## 2023-12-28 RX ORDER — OMEPRAZOLE 40 MG/1
40 CAPSULE, DELAYED RELEASE ORAL DAILY
Qty: 90 CAPSULE | Refills: 1 | Status: SHIPPED | OUTPATIENT
Start: 2023-12-28

## 2024-01-04 ENCOUNTER — OFFICE VISIT (OUTPATIENT)
Dept: FAMILY MEDICINE CLINIC | Facility: OTHER | Age: 54
End: 2024-01-04
Payer: COMMERCIAL

## 2024-01-04 VITALS
WEIGHT: 150 LBS | TEMPERATURE: 97.6 F | OXYGEN SATURATION: 97 % | DIASTOLIC BLOOD PRESSURE: 70 MMHG | RESPIRATION RATE: 17 BRPM | BODY MASS INDEX: 22.73 KG/M2 | HEART RATE: 91 BPM | SYSTOLIC BLOOD PRESSURE: 138 MMHG | HEIGHT: 68 IN

## 2024-01-04 DIAGNOSIS — E11.9 TYPE 2 DIABETES MELLITUS WITHOUT COMPLICATION, WITHOUT LONG-TERM CURRENT USE OF INSULIN (HCC): ICD-10-CM

## 2024-01-04 DIAGNOSIS — G47.33 OSA (OBSTRUCTIVE SLEEP APNEA): ICD-10-CM

## 2024-01-04 DIAGNOSIS — Z00.00 ANNUAL PHYSICAL EXAM: Primary | ICD-10-CM

## 2024-01-04 DIAGNOSIS — Z87.891 FORMER SMOKER: ICD-10-CM

## 2024-01-04 DIAGNOSIS — E78.5 DYSLIPIDEMIA ASSOCIATED WITH TYPE 2 DIABETES MELLITUS: ICD-10-CM

## 2024-01-04 DIAGNOSIS — E11.69 DYSLIPIDEMIA ASSOCIATED WITH TYPE 2 DIABETES MELLITUS: ICD-10-CM

## 2024-01-04 DIAGNOSIS — K86.0 ALCOHOL-INDUCED CHRONIC PANCREATITIS (HCC): ICD-10-CM

## 2024-01-04 DIAGNOSIS — Z12.5 SCREENING PSA (PROSTATE SPECIFIC ANTIGEN): ICD-10-CM

## 2024-01-04 PROCEDURE — 99396 PREV VISIT EST AGE 40-64: CPT | Performed by: FAMILY MEDICINE

## 2024-01-04 NOTE — PROGRESS NOTES
ADULT ANNUAL PHYSICAL  [facility]    NAME: Luz Maria Mcpherson  AGE: 53 y.o. SEX: male  : 1970     DATE: 2024     Assessment and Plan:     Problem List Items Addressed This Visit       Dyslipidemia associated with type 2 diabetes mellitus     Relevant Orders    Albumin / creatinine urine ratio    Comprehensive metabolic panel    Hemoglobin A1C    Lipid Panel with Direct LDL reflex    TOBIN (obstructive sleep apnea)    Relevant Orders    Ambulatory Referral to Sleep Medicine    Type 2 diabetes mellitus without complication, without long-term current use of insulin (HCC)    Relevant Orders    Albumin / creatinine urine ratio    Comprehensive metabolic panel    Hemoglobin A1C    Lipid Panel with Direct LDL reflex    Alcohol-induced chronic pancreatitis (HCC)    Relevant Orders    Comprehensive metabolic panel     Other Visit Diagnoses       Annual physical exam    -  Primary    Screening PSA (prostate specific antigen)        Relevant Orders    PSA, Total Screen    Former smoker        Relevant Orders    CT lung screening program              Immunizations and preventive care screenings were discussed with patient today. Appropriate education was printed on patient's after visit summary.    Discussed risks and benefits of prostate cancer screening. We discussed the controversial history of PSA screening for prostate cancer in the [place] as well as the risk of over detection and over treatment of prostate cancer by way of PSA screening.  The patient understands that PSA blood testing is an imperfect way to screen for prostate cancer and that elevated PSA levels in the blood may also be caused by infection, inflammation, prostatic trauma or manipulation, urological procedures, or by benign prostatic enlargement.    The role of the digital rectal examination in prostate cancer screening was also discussed and I discussed with him that there is large  interobserver variability in the findings of digital rectal examination.    Counseling:  Alcohol/drug use: discussed moderation in alcohol intake, the recommendations for healthy alcohol use, and avoidance of illicit drug use.  Dental Health: discussed importance of regular tooth brushing, flossing, and dental visits.  Injury prevention: discussed safety/seat belts, safety helmets, smoke detectors, carbon dioxide detectors, and smoking near bedding or upholstery.  Sexual health: discussed sexually transmitted diseases, partner selection, use of condoms, avoidance of unintended pregnancy, and contraceptive alternatives.  Exercise: the importance of regular exercise/physical activity was discussed. Recommend exercise 3-5 times per week for at least 30 minutes.       Lung Cancer Screening Shared Decision Making: I discussed with him that he is a candidate for lung cancer CT screening.     The following Shared Decision-Making points were covered:  Benefits of screening were discussed, including the rates of reduction in death from lung cancer and other causes.  Harms of screening were reviewed, including false positive tests, radiation exposure levels, risks of invasive procedures, risks of complications of screening, and risk of overdiagnosis.  I counseled on the importance of adherence to annual lung cancer LDCT screening, impact of co-morbidities, and ability or willingness to undergo diagnosis and treatment.  I counseled on the importance of maintaining abstinence as a former smoker or was counseled on the importance of smoking cessation if a current smoker    Review of Eligibility Criteria: He meets all of the criteria for Lung Cancer Screening.   - He is 53 y.o.   - He has 20 pack year tobacco history and is a current smoker or has quit within the past 15 years  - He presents no signs or symptoms of lung cancer    After discussion, the patient decided to elect lung cancer screening.        Return in about 3 months  (around 4/4/2024) for Recheck T2DM (labs due prior).         Chief Complaint:     Chief Complaint   Patient presents with    Physical Exam      History of Present Illness:     Adult Annual Physical   Patient here for a comprehensive physical exam. The patient reports no problems.    Diet and Physical Activity  Diet/Nutrition: poor diet, frequent junk food, consuming 3-5 servings of fruits/vegetables daily, and plans to get back on track now that the holidays are over .   Exercise: no formal exercise and keeps active with work .      Depression Screening  PHQ-2/9 Depression Screening           General Health  Sleep: sleeps poorly, gets 4-6 hours of sleep on average, snores loudly, and unrefreshing sleep. Dx with TOBIN and has CPAP but has been unable to tolerate.  Hearing: normal - bilateral.  Vision: goes for regular eye exams and most recent eye exam >1 year ago.   Dental: regular dental visits.        Health  Symptoms include: none    Advanced Care Planning  Do you have an advanced directive? no  Do you have a durable medical power of ? no     Review of Systems:     Review of Systems   Constitutional:  Negative for appetite change, fatigue, fever and unexpected weight change.   HENT:  Negative for congestion, dental problem, ear pain, postnasal drip, sore throat and tinnitus.    Eyes:  Negative for pain, discharge and visual disturbance.   Respiratory:  Negative for cough, shortness of breath and wheezing.    Cardiovascular:  Negative for chest pain, palpitations and leg swelling.   Gastrointestinal:  Negative for abdominal pain, constipation, diarrhea, nausea and vomiting.   Endocrine: Negative for cold intolerance and heat intolerance.   Genitourinary:  Negative for difficulty urinating, dysuria, flank pain and urgency.   Musculoskeletal:  Negative for arthralgias, back pain, joint swelling and myalgias.   Skin:  Negative for rash and wound.   Allergic/Immunologic: Negative for immunocompromised state.    Neurological:  Negative for dizziness, syncope, speech difficulty, weakness and numbness.   Hematological:  Negative for adenopathy. Does not bruise/bleed easily.   Psychiatric/Behavioral:  Negative for confusion, dysphoric mood and sleep disturbance. The patient is not nervous/anxious.       Past Medical History:     Past Medical History:   Diagnosis Date    Carter esophagus     Colon polyp     GERD (gastroesophageal reflux disease)     History of illicit drug use     cocaine noted in social history    Type 2 diabetes mellitus (HCC)     10/19/22  Feels well today regarding blood sugar      Past Surgical History:     Past Surgical History:   Procedure Laterality Date    CHOLECYSTECTOMY      CHOLECYSTECTOMY      COLONOSCOPY W/ BIOPSIES  2011    polyps    EGD        Family History:     Family History   Problem Relation Age of Onset    Hypertension Mother     Diabetes Mother     Hyperlipidemia Mother     Hyperlipidemia Father       Social History:     Social History     Socioeconomic History    Marital status: /Civil Union     Spouse name: None    Number of children: None    Years of education: None    Highest education level: None   Occupational History    None   Tobacco Use    Smoking status: Former     Current packs/day: 0.00     Average packs/day: 1 pack/day for 25.0 years (25.0 ttl pk-yrs)     Types: Cigarettes     Start date:      Quit date:      Years since quittin.0    Smokeless tobacco: Never   Vaping Use    Vaping status: Former   Substance and Sexual Activity    Alcohol use: Yes     Alcohol/week: 2.0 standard drinks of alcohol     Types: 2 Cans of beer per week     Comment: weekly    Drug use: No     Comment: not currently    Sexual activity: Yes     Partners: Female   Other Topics Concern    None   Social History Narrative    None     Social Determinants of Health     Financial Resource Strain: Not on file   Food Insecurity: Not on file   Transportation Needs: Not on file   Physical  "Activity: Not on file   Stress: Not on file   Social Connections: Not on file   Intimate Partner Violence: Not on file   Housing Stability: Not on file      Current Medications:     Current Outpatient Medications   Medication Sig Dispense Refill    aspirin (ECOTRIN LOW STRENGTH) 81 mg EC tablet Take 1 tablet (81 mg total) by mouth daily 30 tablet     Farxiga 5 MG TABS TAKE 1 TABLET BY MOUTH EVERY DAY 30 tablet 1    glucose blood test strip Test glucose daily or as directed by physician 100 each 11    glucose monitoring kit (FREESTYLE) monitoring kit Test glucose daily or as directed by physician 1 each 1    Lancets (FREESTYLE) lancets Test glucose daily or as directed by physician 100 each 11    lisinopril (ZESTRIL) 10 mg tablet Take 1 tablet (10 mg total) by mouth daily 90 tablet 1    metFORMIN (GLUCOPHAGE) 500 mg tablet Take 2 tablets (1,000 mg total) by mouth 2 (two) times a day with meals 360 tablet 1    omeprazole (PriLOSEC) 40 MG capsule TAKE 1 CAPSULE BY MOUTH ONCE DAILY 90 capsule 1     No current facility-administered medications for this visit.      Allergies:     Allergies   Allergen Reactions    Jardiance [Empagliflozin] Nausea Only      Physical Exam:     /70   Pulse 91   Temp 97.6 °F (36.4 °C)   Resp 17   Ht 5' 8\" (1.727 m)   Wt 68 kg (150 lb)   SpO2 97%   BMI 22.81 kg/m²     Physical Exam  Vitals and nursing note reviewed.   Constitutional:       General: He is not in acute distress.     Appearance: Normal appearance. He is well-developed. He is not ill-appearing.      Comments: Body mass index is 22.81 kg/m².    HENT:      Head: Normocephalic and atraumatic.      Right Ear: Hearing, tympanic membrane, ear canal and external ear normal.      Left Ear: Hearing, tympanic membrane, ear canal and external ear normal.      Nose: Nose normal.      Mouth/Throat:      Pharynx: Uvula midline.   Eyes:      General: No scleral icterus.     Conjunctiva/sclera: Conjunctivae normal.      Pupils: Pupils " are equal, round, and reactive to light.   Neck:      Thyroid: No thyromegaly.      Vascular: No JVD.   Cardiovascular:      Rate and Rhythm: Normal rate and regular rhythm.      Heart sounds: Normal heart sounds. No murmur heard.  Pulmonary:      Effort: Pulmonary effort is normal. No respiratory distress.      Breath sounds: Normal breath sounds.   Abdominal:      General: Bowel sounds are normal. There is no distension.      Palpations: Abdomen is soft. There is no mass.      Tenderness: There is no abdominal tenderness.   Musculoskeletal:         General: No tenderness. Normal range of motion.      Cervical back: Normal range of motion and neck supple.      Right lower leg: No edema.      Left lower leg: No edema.   Lymphadenopathy:      Cervical: No cervical adenopathy.   Skin:     General: Skin is warm and dry.      Capillary Refill: Capillary refill takes less than 2 seconds.      Findings: No rash.   Neurological:      General: No focal deficit present.      Mental Status: He is alert and oriented to person, place, and time.      Cranial Nerves: No cranial nerve deficit.   Psychiatric:         Mood and Affect: Mood normal.         Behavior: Behavior normal.          Josie Kaur DO  [facility]

## 2024-04-01 DIAGNOSIS — I10 ESSENTIAL HYPERTENSION: ICD-10-CM

## 2024-04-01 RX ORDER — LISINOPRIL 10 MG/1
10 TABLET ORAL DAILY
Qty: 90 TABLET | Refills: 1 | Status: SHIPPED | OUTPATIENT
Start: 2024-04-01

## 2024-04-29 ENCOUNTER — TELEPHONE (OUTPATIENT)
Age: 54
End: 2024-04-29

## 2024-05-24 ENCOUNTER — TELEPHONE (OUTPATIENT)
Age: 54
End: 2024-05-24

## 2024-05-24 DIAGNOSIS — E11.9 TYPE 2 DIABETES MELLITUS WITHOUT COMPLICATION, WITHOUT LONG-TERM CURRENT USE OF INSULIN (HCC): ICD-10-CM

## 2024-05-24 RX ORDER — DAPAGLIFLOZIN 5 MG/1
5 TABLET, FILM COATED ORAL DAILY
Qty: 30 TABLET | Refills: 5 | Status: SHIPPED | OUTPATIENT
Start: 2024-05-24

## 2024-05-24 NOTE — TELEPHONE ENCOUNTER
Patient called the RX Refill Line. Message is being forwarded to the office.     Patient is requesting a call to schedule an appt. He stated he usually comes every 3 months but he has not been seen since Jan and there are no upcoming appts in his chart. Please advise    Please contact patient at

## 2024-05-24 NOTE — TELEPHONE ENCOUNTER
Reason for call:   [x] Refill   [] Prior Auth  [] Other:     Office:   [x] PCP/Provider - Josie Kaur, DO   [] Specialty/Provider -     Medication: Farxiga 5 MG TABS     Dose/Frequency: TAKE 1 TABLET BY MOUTH EVERY DAY     Quantity: 30    Pharmacy: [id number] [place] - [id number] [facility] [phone]    Does the patient have enough for 3 days?   [] Yes   [x] No - Send as HP to POD

## 2024-06-20 ENCOUNTER — TELEPHONE (OUTPATIENT)
Age: 54
End: 2024-06-20

## 2024-06-20 NOTE — TELEPHONE ENCOUNTER
Called pt to see if he wants to schedule  DM eye exam with our office stated he will make appointment with his eye doctor

## 2024-07-08 DIAGNOSIS — E11.9 TYPE 2 DIABETES MELLITUS WITHOUT COMPLICATION, WITHOUT LONG-TERM CURRENT USE OF INSULIN (HCC): ICD-10-CM

## 2024-07-08 DIAGNOSIS — I10 ESSENTIAL HYPERTENSION: ICD-10-CM

## 2024-07-08 DIAGNOSIS — K22.70 BARRETT'S ESOPHAGUS WITHOUT DYSPLASIA: ICD-10-CM

## 2024-07-08 DIAGNOSIS — K21.00 GASTROESOPHAGEAL REFLUX DISEASE WITH ESOPHAGITIS WITHOUT HEMORRHAGE: ICD-10-CM

## 2024-07-08 RX ORDER — OMEPRAZOLE 40 MG/1
40 CAPSULE, DELAYED RELEASE ORAL DAILY
Qty: 5 CAPSULE | Refills: 0 | Status: SHIPPED | OUTPATIENT
Start: 2024-07-08 | End: 2024-07-15 | Stop reason: SDUPTHER

## 2024-07-08 RX ORDER — LISINOPRIL 10 MG/1
10 TABLET ORAL DAILY
Qty: 5 TABLET | Refills: 0 | Status: SHIPPED | OUTPATIENT
Start: 2024-07-08 | End: 2024-07-15 | Stop reason: SDUPTHER

## 2024-07-08 RX ORDER — DAPAGLIFLOZIN 5 MG/1
5 TABLET, FILM COATED ORAL DAILY
Qty: 5 TABLET | Refills: 0 | Status: SHIPPED | OUTPATIENT
Start: 2024-07-08 | End: 2024-07-15 | Stop reason: SDUPTHER

## 2024-07-12 ENCOUNTER — APPOINTMENT (OUTPATIENT)
Dept: LAB | Facility: CLINIC | Age: 54
End: 2024-07-12
Payer: COMMERCIAL

## 2024-07-12 DIAGNOSIS — E78.5 DYSLIPIDEMIA ASSOCIATED WITH TYPE 2 DIABETES MELLITUS  (HCC): ICD-10-CM

## 2024-07-12 DIAGNOSIS — K86.0 ALCOHOL-INDUCED CHRONIC PANCREATITIS (HCC): ICD-10-CM

## 2024-07-12 DIAGNOSIS — Z12.5 SCREENING PSA (PROSTATE SPECIFIC ANTIGEN): ICD-10-CM

## 2024-07-12 DIAGNOSIS — E11.9 TYPE 2 DIABETES MELLITUS WITHOUT COMPLICATION, WITHOUT LONG-TERM CURRENT USE OF INSULIN (HCC): ICD-10-CM

## 2024-07-12 DIAGNOSIS — E11.69 DYSLIPIDEMIA ASSOCIATED WITH TYPE 2 DIABETES MELLITUS  (HCC): ICD-10-CM

## 2024-07-12 LAB
ALBUMIN SERPL BCG-MCNC: 4.3 G/DL (ref 3.5–5)
ALP SERPL-CCNC: 46 U/L (ref 34–104)
ALT SERPL W P-5'-P-CCNC: 43 U/L (ref 7–52)
ANION GAP SERPL CALCULATED.3IONS-SCNC: 5 MMOL/L (ref 4–13)
AST SERPL W P-5'-P-CCNC: 29 U/L (ref 13–39)
BILIRUB SERPL-MCNC: 0.67 MG/DL (ref 0.2–1)
BUN SERPL-MCNC: 8 MG/DL (ref 5–25)
CALCIUM SERPL-MCNC: 9.2 MG/DL (ref 8.4–10.2)
CHLORIDE SERPL-SCNC: 103 MMOL/L (ref 96–108)
CHOLEST SERPL-MCNC: 191 MG/DL
CO2 SERPL-SCNC: 32 MMOL/L (ref 21–32)
CREAT SERPL-MCNC: 0.7 MG/DL (ref 0.6–1.3)
CREAT UR-MCNC: 137.6 MG/DL
EST. AVERAGE GLUCOSE BLD GHB EST-MCNC: 143 MG/DL
GFR SERPL CREATININE-BSD FRML MDRD: 108 ML/MIN/1.73SQ M
GLUCOSE P FAST SERPL-MCNC: 144 MG/DL (ref 65–99)
HBA1C MFR BLD: 6.6 %
HDLC SERPL-MCNC: 57 MG/DL
LDLC SERPL CALC-MCNC: 108 MG/DL (ref 0–100)
MICROALBUMIN UR-MCNC: 41.6 MG/L
MICROALBUMIN/CREAT 24H UR: 30 MG/G CREATININE (ref 0–30)
POTASSIUM SERPL-SCNC: 4.4 MMOL/L (ref 3.5–5.3)
PROT SERPL-MCNC: 6.5 G/DL (ref 6.4–8.4)
PSA SERPL-MCNC: 0.52 NG/ML (ref 0–4)
SODIUM SERPL-SCNC: 140 MMOL/L (ref 135–147)
TRIGL SERPL-MCNC: 131 MG/DL

## 2024-07-12 PROCEDURE — 82043 UR ALBUMIN QUANTITATIVE: CPT

## 2024-07-12 PROCEDURE — 80053 COMPREHEN METABOLIC PANEL: CPT

## 2024-07-12 PROCEDURE — 36415 COLL VENOUS BLD VENIPUNCTURE: CPT

## 2024-07-12 PROCEDURE — 82570 ASSAY OF URINE CREATININE: CPT

## 2024-07-12 PROCEDURE — 83036 HEMOGLOBIN GLYCOSYLATED A1C: CPT

## 2024-07-12 PROCEDURE — 80061 LIPID PANEL: CPT

## 2024-07-12 PROCEDURE — G0103 PSA SCREENING: HCPCS

## 2024-07-15 ENCOUNTER — OFFICE VISIT (OUTPATIENT)
Age: 54
End: 2024-07-15
Payer: COMMERCIAL

## 2024-07-15 VITALS
SYSTOLIC BLOOD PRESSURE: 132 MMHG | HEART RATE: 95 BPM | HEIGHT: 68 IN | TEMPERATURE: 98.2 F | BODY MASS INDEX: 23.07 KG/M2 | DIASTOLIC BLOOD PRESSURE: 70 MMHG | OXYGEN SATURATION: 99 % | WEIGHT: 152.2 LBS | RESPIRATION RATE: 18 BRPM

## 2024-07-15 DIAGNOSIS — G47.33 OSA (OBSTRUCTIVE SLEEP APNEA): ICD-10-CM

## 2024-07-15 DIAGNOSIS — E11.9 TYPE 2 DIABETES MELLITUS WITHOUT COMPLICATION, WITHOUT LONG-TERM CURRENT USE OF INSULIN (HCC): ICD-10-CM

## 2024-07-15 DIAGNOSIS — K21.00 GASTROESOPHAGEAL REFLUX DISEASE WITH ESOPHAGITIS WITHOUT HEMORRHAGE: ICD-10-CM

## 2024-07-15 DIAGNOSIS — S16.1XXA STRAIN OF STERNOCLEIDOMASTOID MUSCLE, INITIAL ENCOUNTER: ICD-10-CM

## 2024-07-15 DIAGNOSIS — E11.9 TYPE 2 DIABETES MELLITUS WITHOUT COMPLICATION, WITHOUT LONG-TERM CURRENT USE OF INSULIN (HCC): Primary | ICD-10-CM

## 2024-07-15 DIAGNOSIS — I10 ESSENTIAL HYPERTENSION: ICD-10-CM

## 2024-07-15 DIAGNOSIS — E78.5 DYSLIPIDEMIA ASSOCIATED WITH TYPE 2 DIABETES MELLITUS  (HCC): ICD-10-CM

## 2024-07-15 DIAGNOSIS — R73.01 IMPAIRED FASTING GLUCOSE: ICD-10-CM

## 2024-07-15 DIAGNOSIS — K22.70 BARRETT'S ESOPHAGUS WITHOUT DYSPLASIA: ICD-10-CM

## 2024-07-15 DIAGNOSIS — E11.69 DYSLIPIDEMIA ASSOCIATED WITH TYPE 2 DIABETES MELLITUS  (HCC): ICD-10-CM

## 2024-07-15 PROCEDURE — 99214 OFFICE O/P EST MOD 30 MIN: CPT | Performed by: FAMILY MEDICINE

## 2024-07-15 RX ORDER — DAPAGLIFLOZIN 5 MG/1
5 TABLET, FILM COATED ORAL DAILY
Qty: 90 TABLET | Refills: 1 | Status: SHIPPED | OUTPATIENT
Start: 2024-07-15

## 2024-07-15 RX ORDER — OMEPRAZOLE 40 MG/1
40 CAPSULE, DELAYED RELEASE ORAL DAILY
Qty: 90 CAPSULE | Refills: 1 | Status: SHIPPED | OUTPATIENT
Start: 2024-07-15

## 2024-07-15 RX ORDER — NAPROXEN 500 MG/1
500 TABLET ORAL 2 TIMES DAILY WITH MEALS
Qty: 28 TABLET | Refills: 0 | Status: SHIPPED | OUTPATIENT
Start: 2024-07-15 | End: 2024-07-26

## 2024-07-15 RX ORDER — LISINOPRIL 10 MG/1
10 TABLET ORAL DAILY
Qty: 90 TABLET | Refills: 1 | Status: SHIPPED | OUTPATIENT
Start: 2024-07-15

## 2024-07-15 NOTE — PROGRESS NOTES
Ambulatory Visit  Name: Luz Maria Mcpherson      : 1970      MRN: [id number]  Encounter Provider: Josie Kaur DO  Encounter Date: 7/15/2024   Encounter department: [facility]    Assessment & Plan   1. Type 2 diabetes mellitus without complication, without long-term current use of insulin (HCC)  Assessment & Plan:  A1c goal 6 to 7%  Lab Results   Component Value Date    HGBA1C 6.6 (H) 2024   Plan to continue Farxiga 5 mg daily and metformin 1000 mg twice daily  Suggested setting an alarm so that he does not miss his evening dose of metformin moving forward  Patient will obtain a diabetic eye exam through [facility] by the end of   Repeat labs in 5 to 6 months  2. Dyslipidemia associated with type 2 diabetes mellitus  (HCC)  Assessment & Plan:    Lab Results   Component Value Date    HGBA1C 6.6 (H) 2024     LDL goal less than 100 (ideally less than 70)  ASCVD risk 8.2%  Patient wishes to pursue lifestyle modifications rather than statin therapy at this time  Plan to follow closely and reevaluate at next visit  3. Essential hypertension  Assessment & Plan:  BP goal less than 140/90  Continue lisinopril 10 mg daily, DASH diet  4. TOBIN (obstructive sleep apnea)  Assessment & Plan:  Patient encouraged to follow-up with sleep medicine  Referral reprinted for patient today  5. Strain of sternocleidomastoid muscle, initial encounter  Assessment & Plan:  New diagnosis, no prior workup  Presentation is most consistent with a strain rather than mitchell torticollis  Recommend naproxen 500 mg twice daily for 2 weeks  Follow-up next week for OMT  Orders:  -     naproxen (Naprosyn) 500 mg tablet; Take 1 tablet (500 mg total) by mouth 2 (two) times a day with meals for 14 days      Depression Screening and Follow-up Plan: Patient was screened for depression during today's encounter. They screened negative with a PHQ-2 score of 0.      Return in about 1 week (around 2024) for  OMT for tight SCM on left side.    The patient indicates understanding of these issues and agrees with the plan.            History of Present Illness     HPI  Patient presents for follow-up of type 2 diabetes, dyslipidemia, hypertension  Reports feeling well at this time  Reports good medication compliance with 1 exception--frequently forgets to take his evening dose of metformin  Denies symptoms of hyper or hypoglycemia  Home blood pressure readings are consistently below 140/90  Denies chest pain, shortness of breath, blurry vision, abdominal pain, paresthesias    He indicates that for the last several weeks he has been experiencing pain in the left side of his neck  This is worse when he looks to left by turning his head  He notes the sensation that his muscles are tight/spasmed  Symptoms do improve mildly with intermittent use of NSAIDs      Review of Systems   Constitutional:  Negative for activity change, fatigue and fever.   HENT:  Negative for congestion, ear pain, sinus pain and sore throat.    Eyes:  Negative for pain, itching and visual disturbance.   Respiratory:  Negative for cough and shortness of breath.    Cardiovascular:  Negative for chest pain, palpitations and leg swelling.   Gastrointestinal:  Negative for abdominal pain, constipation, diarrhea, nausea and vomiting.   Endocrine: Negative for cold intolerance, heat intolerance, polydipsia, polyphagia and polyuria.   Genitourinary:  Negative for dysuria.   Musculoskeletal:  Positive for neck pain and neck stiffness. Negative for arthralgias and myalgias.   Skin:  Negative for color change and rash.   Neurological:  Negative for dizziness, syncope and headaches.   Hematological:  Negative for adenopathy.   Psychiatric/Behavioral:  Negative for dysphoric mood. The patient is not nervous/anxious.      Medical History Reviewed by provider this encounter:  Tobacco  Allergies  Meds  Problems  Med Hx  Surg Hx  Fam Hx       Objective     /70  "  Pulse 95   Temp 98.2 °F (36.8 °C)   Resp 18   Ht 5' 8\" (1.727 m)   Wt 69 kg (152 lb 3.2 oz)   SpO2 99%   BMI 23.14 kg/m²     Physical Exam  Vitals and nursing note reviewed.   Constitutional:       General: He is not in acute distress.     Appearance: Normal appearance. He is well-developed. He is not ill-appearing.      Comments: Body mass index is 23.14 kg/m².    HENT:      Head: Normocephalic and atraumatic.   Eyes:      General: No scleral icterus.     Conjunctiva/sclera: Conjunctivae normal.      Pupils: Pupils are equal, round, and reactive to light.   Cardiovascular:      Rate and Rhythm: Normal rate and regular rhythm.      Pulses:           Dorsalis pedis pulses are 2+ on the right side and 2+ on the left side.        Posterior tibial pulses are 2+ on the right side and 2+ on the left side.      Heart sounds: Normal heart sounds. No murmur heard.  Pulmonary:      Effort: Pulmonary effort is normal. No respiratory distress.      Breath sounds: Normal breath sounds.   Abdominal:      General: Bowel sounds are normal. There is no distension.      Palpations: Abdomen is soft.      Tenderness: There is no abdominal tenderness.   Musculoskeletal:      Cervical back: Neck supple. Spasms (left SCM) and tenderness present. No edema, deformity, erythema, signs of trauma, rigidity, torticollis, bony tenderness or crepitus. Pain with movement present. Decreased range of motion (L>R rotation).      Right lower leg: No edema.      Left lower leg: No edema.   Feet:      Right foot:      Skin integrity: No ulcer, skin breakdown, erythema, warmth, callus or dry skin.      Left foot:      Skin integrity: No ulcer, skin breakdown, erythema, warmth, callus or dry skin.   Lymphadenopathy:      Cervical: No cervical adenopathy.   Skin:     General: Skin is warm and dry.      Coloration: Skin is not jaundiced.      Findings: No rash.   Neurological:      General: No focal deficit present.      Mental Status: He is alert " and oriented to person, place, and time.   Psychiatric:         Mood and Affect: Mood normal.         Behavior: Behavior normal.

## 2024-07-16 PROBLEM — R10.31 RIGHT GROIN PAIN: Status: RESOLVED | Noted: 2019-10-28 | Resolved: 2024-07-16

## 2024-07-16 PROBLEM — S16.1XXA STRAIN OF STERNOCLEIDOMASTOID MUSCLE: Status: ACTIVE | Noted: 2024-07-16

## 2024-07-16 NOTE — ASSESSMENT & PLAN NOTE
New diagnosis, no prior workup  Presentation is most consistent with a strain rather than mitchell torticollis  Recommend naproxen 500 mg twice daily for 2 weeks  Follow-up next week for OMT

## 2024-07-16 NOTE — ASSESSMENT & PLAN NOTE
Lab Results   Component Value Date    HGBA1C 6.6 (H) 07/12/2024     LDL goal less than 100 (ideally less than 70)  ASCVD risk 8.2%  Patient wishes to pursue lifestyle modifications rather than statin therapy at this time  Plan to follow closely and reevaluate at next visit

## 2024-07-16 NOTE — ASSESSMENT & PLAN NOTE
A1c goal 6 to 7%  Lab Results   Component Value Date    HGBA1C 6.6 (H) 07/12/2024   Plan to continue Farxiga 5 mg daily and metformin 1000 mg twice daily  Suggested setting an alarm so that he does not miss his evening dose of metformin moving forward  Patient will obtain a diabetic eye exam through [facility] by the end of 2024  Repeat labs in 5 to 6 months

## 2024-07-26 DIAGNOSIS — S16.1XXA STRAIN OF STERNOCLEIDOMASTOID MUSCLE, INITIAL ENCOUNTER: ICD-10-CM

## 2024-07-26 RX ORDER — NAPROXEN 500 MG/1
TABLET ORAL
Qty: 28 TABLET | Refills: 0 | Status: SHIPPED | OUTPATIENT
Start: 2024-07-26

## 2024-12-25 DIAGNOSIS — E11.9 TYPE 2 DIABETES MELLITUS WITHOUT COMPLICATION, WITHOUT LONG-TERM CURRENT USE OF INSULIN (HCC): ICD-10-CM

## 2025-01-30 ENCOUNTER — APPOINTMENT (OUTPATIENT)
Dept: LAB | Facility: CLINIC | Age: 55
End: 2025-01-30
Payer: COMMERCIAL

## 2025-01-30 ENCOUNTER — OFFICE VISIT (OUTPATIENT)
Age: 55
End: 2025-01-30
Payer: COMMERCIAL

## 2025-01-30 ENCOUNTER — PREP FOR PROCEDURE (OUTPATIENT)
Age: 55
End: 2025-01-30

## 2025-01-30 ENCOUNTER — TELEPHONE (OUTPATIENT)
Age: 55
End: 2025-01-30

## 2025-01-30 VITALS
WEIGHT: 153.4 LBS | TEMPERATURE: 98 F | HEIGHT: 68 IN | OXYGEN SATURATION: 98 % | SYSTOLIC BLOOD PRESSURE: 124 MMHG | DIASTOLIC BLOOD PRESSURE: 60 MMHG | HEART RATE: 84 BPM | BODY MASS INDEX: 23.25 KG/M2

## 2025-01-30 DIAGNOSIS — K22.70 BARRETT'S ESOPHAGUS WITHOUT DYSPLASIA: Primary | ICD-10-CM

## 2025-01-30 DIAGNOSIS — E11.9 TYPE 2 DIABETES MELLITUS WITHOUT COMPLICATION, WITHOUT LONG-TERM CURRENT USE OF INSULIN (HCC): Primary | ICD-10-CM

## 2025-01-30 DIAGNOSIS — E11.69 DYSLIPIDEMIA ASSOCIATED WITH TYPE 2 DIABETES MELLITUS  (HCC): ICD-10-CM

## 2025-01-30 DIAGNOSIS — Z12.2 SCREENING FOR LUNG CANCER: ICD-10-CM

## 2025-01-30 DIAGNOSIS — K22.70 BARRETT'S ESOPHAGUS WITHOUT DYSPLASIA: ICD-10-CM

## 2025-01-30 DIAGNOSIS — E11.9 ENCOUNTER FOR DIABETIC FOOT EXAM (HCC): ICD-10-CM

## 2025-01-30 DIAGNOSIS — E78.5 DYSLIPIDEMIA ASSOCIATED WITH TYPE 2 DIABETES MELLITUS  (HCC): ICD-10-CM

## 2025-01-30 DIAGNOSIS — Z00.00 ANNUAL PHYSICAL EXAM: ICD-10-CM

## 2025-01-30 DIAGNOSIS — Z86.0100 HISTORY OF COLONIC POLYPS: ICD-10-CM

## 2025-01-30 DIAGNOSIS — R22.1 MASS OF LEFT SIDE OF NECK: ICD-10-CM

## 2025-01-30 DIAGNOSIS — K86.0 ALCOHOL-INDUCED CHRONIC PANCREATITIS (HCC): ICD-10-CM

## 2025-01-30 DIAGNOSIS — K21.00 GASTROESOPHAGEAL REFLUX DISEASE WITH ESOPHAGITIS WITHOUT HEMORRHAGE: ICD-10-CM

## 2025-01-30 DIAGNOSIS — E11.9 TYPE 2 DIABETES MELLITUS WITHOUT COMPLICATION, WITHOUT LONG-TERM CURRENT USE OF INSULIN (HCC): ICD-10-CM

## 2025-01-30 DIAGNOSIS — Z86.0100 HISTORY OF COLON POLYPS: ICD-10-CM

## 2025-01-30 DIAGNOSIS — I10 ESSENTIAL HYPERTENSION: ICD-10-CM

## 2025-01-30 DIAGNOSIS — G47.33 OSA (OBSTRUCTIVE SLEEP APNEA): ICD-10-CM

## 2025-01-30 LAB
ALBUMIN SERPL BCG-MCNC: 4.8 G/DL (ref 3.5–5)
ALP SERPL-CCNC: 59 U/L (ref 34–104)
ALT SERPL W P-5'-P-CCNC: 101 U/L (ref 7–52)
ANION GAP SERPL CALCULATED.3IONS-SCNC: 7 MMOL/L (ref 4–13)
AST SERPL W P-5'-P-CCNC: 63 U/L (ref 13–39)
BILIRUB SERPL-MCNC: 0.79 MG/DL (ref 0.2–1)
BUN SERPL-MCNC: 16 MG/DL (ref 5–25)
CALCIUM SERPL-MCNC: 10.2 MG/DL (ref 8.4–10.2)
CHLORIDE SERPL-SCNC: 103 MMOL/L (ref 96–108)
CHOLEST SERPL-MCNC: 204 MG/DL (ref ?–200)
CO2 SERPL-SCNC: 32 MMOL/L (ref 21–32)
CREAT SERPL-MCNC: 0.78 MG/DL (ref 0.6–1.3)
CREAT UR-MCNC: 81.7 MG/DL
GFR SERPL CREATININE-BSD FRML MDRD: 103 ML/MIN/1.73SQ M
GLUCOSE P FAST SERPL-MCNC: 155 MG/DL (ref 65–99)
HDLC SERPL-MCNC: 54 MG/DL
LDLC SERPL CALC-MCNC: 121 MG/DL (ref 0–100)
MICROALBUMIN UR-MCNC: 7.6 MG/L
MICROALBUMIN/CREAT 24H UR: 9 MG/G CREATININE (ref 0–30)
POTASSIUM SERPL-SCNC: 4.6 MMOL/L (ref 3.5–5.3)
PROT SERPL-MCNC: 7.1 G/DL (ref 6.4–8.4)
SL AMB POCT HEMOGLOBIN AIC: 6.8 (ref ?–6.5)
SODIUM SERPL-SCNC: 142 MMOL/L (ref 135–147)
TRIGL SERPL-MCNC: 146 MG/DL (ref ?–150)

## 2025-01-30 PROCEDURE — 99396 PREV VISIT EST AGE 40-64: CPT | Performed by: FAMILY MEDICINE

## 2025-01-30 PROCEDURE — 80053 COMPREHEN METABOLIC PANEL: CPT

## 2025-01-30 PROCEDURE — 82043 UR ALBUMIN QUANTITATIVE: CPT

## 2025-01-30 PROCEDURE — 80061 LIPID PANEL: CPT

## 2025-01-30 PROCEDURE — 99214 OFFICE O/P EST MOD 30 MIN: CPT | Performed by: FAMILY MEDICINE

## 2025-01-30 PROCEDURE — 82570 ASSAY OF URINE CREATININE: CPT

## 2025-01-30 PROCEDURE — 83036 HEMOGLOBIN GLYCOSYLATED A1C: CPT | Performed by: FAMILY MEDICINE

## 2025-01-30 PROCEDURE — 36415 COLL VENOUS BLD VENIPUNCTURE: CPT

## 2025-01-30 RX ORDER — OMEPRAZOLE 40 MG/1
40 CAPSULE, DELAYED RELEASE ORAL DAILY
Qty: 100 CAPSULE | Refills: 1 | Status: SHIPPED | OUTPATIENT
Start: 2025-01-30

## 2025-01-30 RX ORDER — DAPAGLIFLOZIN 5 MG/1
5 TABLET, FILM COATED ORAL DAILY
Qty: 100 TABLET | Refills: 1 | Status: SHIPPED | OUTPATIENT
Start: 2025-01-30

## 2025-01-30 RX ORDER — LISINOPRIL 10 MG/1
10 TABLET ORAL DAILY
Qty: 100 TABLET | Refills: 1 | Status: SHIPPED | OUTPATIENT
Start: 2025-01-30

## 2025-01-30 NOTE — ASSESSMENT & PLAN NOTE
Chronic, untreated  Patient unable to tolerate CPAP, reports having tried 2 different types of apparatus  Recommend you follow-up with sleep medicine for updated test and to see if he is a candidate for inspire    Orders:    Ambulatory Referral to Sleep Medicine; Future

## 2025-01-30 NOTE — PATIENT INSTRUCTIONS
"Patient Education     Routine physical for adults   The Basics   Written by the doctors and editors at [facility], [place]   What is a physical? -- A physical is a routine visit, or \"check-up,\" with your doctor. You might also hear it called a \"wellness visit\" or \"preventive visit.\"  During each visit, the doctor will:   Ask about your physical and mental health   Ask about your habits, behaviors, and lifestyle   Do an exam   Give you vaccines if needed   Talk to you about any medicines you take   Give advice about your health   Answer your questions  Getting regular check-ups is an important part of taking care of your health. It can help your doctor find and treat any problems you have. But it's also important for preventing health problems.  A routine physical is different from a \"sick visit.\" A sick visit is when you see a doctor because of a health concern or problem. Since physicals are scheduled ahead of time, you can think about what you want to ask the doctor.  How often should I get a physical? -- It depends on your age and health. In general, for people age 21 years and older:   If you are younger than 50 years, you might be able to get a physical every 3 years.   If you are 50 years or older, your doctor might recommend a physical every year.  If you have an ongoing health condition, like diabetes or high blood pressure, your doctor will probably want to see you more often.  What happens during a physical? -- In general, each visit will include:   Physical exam - The doctor or nurse will check your height, weight, heart rate, and blood pressure. They will also look at your eyes and ears. They will ask about how you are feeling and whether you have any symptoms that bother you.   Medicines - It's a good idea to bring a list of all the medicines you take to each doctor visit. Your doctor will talk to you about your medicines and answer any questions. Tell them if you are having any side effects that bother you. You " "should also tell them if you are having trouble paying for any of your medicines.   Habits and behaviors - This includes:   Your diet   Your exercise habits   Whether you smoke, drink alcohol, or use drugs   Whether you are sexually active   Whether you feel safe at home  Your doctor will talk to you about things you can do to improve your health and lower your risk of health problems. They will also offer help and support. For example, if you want to quit smoking, they can give you advice and might prescribe medicines. If you want to improve your diet or get more physical activity, they can help you with this, too.   Lab tests, if needed - The tests you get will depend on your age and situation. For example, your doctor might want to check your:   Cholesterol   Blood sugar   Iron level   Vaccines - The recommended vaccines will depend on your age, health, and what vaccines you already had. Vaccines are very important because they can prevent certain serious or deadly infections.   Discussion of screening - \"Screening\" means checking for diseases or other health problems before they cause symptoms. Your doctor can recommend screening based on your age, risk, and preferences. This might include tests to check for:   Cancer, such as breast, prostate, cervical, ovarian, colorectal, prostate, lung, or skin cancer   Sexually transmitted infections, such as chlamydia and gonorrhea   Mental health conditions like depression and anxiety  Your doctor will talk to you about the different types of screening tests. They can help you decide which screenings to have. They can also explain what the results might mean.   Answering questions - The physical is a good time to ask the doctor or nurse questions about your health. If needed, they can refer you to other doctors or specialists, too.  Adults older than 65 years often need other care, too. As you get older, your doctor will talk to you about:   How to prevent falling at " home   Hearing or vision tests   Memory testing   How to take your medicines safely   Making sure that you have the help and support you need at home  All topics are updated as new evidence becomes available and our peer review process is complete.  This topic retrieved from [employer] on: May 02, 2024.  Topic 257321 Version 1.0  Release: 32.4.3 - C32.122  © 2024 UpToDate, Inc. and/or its affiliates. All rights reserved.  Consumer Information Use and Disclaimer   Disclaimer: This generalized information is a limited summary of diagnosis, treatment, and/or medication information. It is not meant to be comprehensive and should be used as a tool to help the user understand and/or assess potential diagnostic and treatment options. It does NOT include all information about conditions, treatments, medications, side effects, or risks that may apply to a specific patient. It is not intended to be medical advice or a substitute for the medical advice, diagnosis, or treatment of a health care provider based on the health care provider's examination and assessment of a patient's specific and unique circumstances. Patients must speak with a health care provider for complete information about their health, medical questions, and treatment options, including any risks or benefits regarding use of medications. This information does not endorse any treatments or medications as safe, effective, or approved for treating a specific patient. UpToDate, Inc. and its affiliates disclaim any warranty or liability relating to this information or the use thereof.The use of this information is governed by the Terms of Use, available at https://www.[employer] Instrumentsuwer.com/en/know/clinical-effectiveness-terms. 2024© UpToDate, Inc. and its affiliates and/or licensors. All rights reserved.  Copyright   © 2024 UpToDate, Inc. and/or its affiliates. All rights reserved.

## 2025-01-30 NOTE — ASSESSMENT & PLAN NOTE
Chronic, stable  Patient reports he has noticed that Farxiga supply has been intermittent at [facility] Farxiga 5 mg daily and metformin 1000 mg twice daily  Is returning in the next week for his diabetic eye exam at [facility]    Lab Results   Component Value Date    HGBA1C 6.8 (A) 01/30/2025     Orders:    POCT hemoglobin A1c    Comprehensive metabolic panel; Future    Lipid Panel with Direct LDL reflex; Future    Albumin / creatinine urine ratio; Future    Ambulatory Referral to Sleep Medicine; Future    dapagliflozin (Farxiga) 5 MG TABS; Take 1 tablet (5 mg total) by mouth daily    metFORMIN (GLUCOPHAGE) 500 mg tablet; Take 2 tablets (1,000 mg total) by mouth 2 (two) times a day with meals

## 2025-01-30 NOTE — PROGRESS NOTES
Adult Annual Physical  Name: Luz Maria Mcpherson      : 1970      MRN: [id number]  Encounter Provider: Josie Kaur DO  Encounter Date: 2025   Encounter department: [facility]    Assessment & Plan  Type 2 diabetes mellitus without complication, without long-term current use of insulin (HCC)  Chronic, stable  Patient reports he has noticed that Farxiga supply has been intermittent at [facility] Farxiga 5 mg daily and metformin 1000 mg twice daily  Is returning in the next week for his diabetic eye exam at [facility]    Lab Results   Component Value Date    HGBA1C 6.8 (A) 2025     Orders:    POCT hemoglobin A1c    Comprehensive metabolic panel; Future    Lipid Panel with Direct LDL reflex; Future    Albumin / creatinine urine ratio; Future    Ambulatory Referral to Sleep Medicine; Future    dapagliflozin (Farxiga) 5 MG TABS; Take 1 tablet (5 mg total) by mouth daily    metFORMIN (GLUCOPHAGE) 500 mg tablet; Take 2 tablets (1,000 mg total) by mouth 2 (two) times a day with meals    TOBIN (obstructive sleep apnea)  Chronic, untreated  Patient unable to tolerate CPAP, reports having tried 2 different types of apparatus  Recommend you follow-up with sleep medicine for updated test and to see if he is a candidate for inspire    Orders:    Ambulatory Referral to Sleep Medicine; Future    Alcohol-induced chronic pancreatitis (HCC)  Alcohol consumption has remained unchanged  Patient reports that he is asymptomatic at this time  Will check LFTs for stability  Due this year for follow-up CT abdomen/pelvis         Dyslipidemia associated with type 2 diabetes mellitus  (HCC)  LDL goal less than 100 (ideally below 70) ASCVD risk 8.2%  Discussions previously had regarding initiation of statin therapy and patient elected to try lifestyle modifications  He is due for repeat lipid profile at which point we can reevaluate therapy options    Lab Results   Component Value Date    HGBA1C  "6.8 (A) 01/30/2025            Mass of left side of neck  No prior workup  Ultrasound ordered to evaluate soft tissue  Follow-up pending results    Orders:    US head neck soft tissue; Future    Essential hypertension  BP goal less than 140/90 (ideally below 130/80)  Continue lisinopril 10 mg daily  Continue to encourage patient to cut back on alcohol consumption    Orders:    lisinopril (ZESTRIL) 10 mg tablet; Take 1 tablet (10 mg total) by mouth daily    Type 2 diabetes mellitus without complication, without long-term current use of insulin (HCC)    Lab Results   Component Value Date    HGBA1C 6.8 (A) 01/30/2025     Orders:    POCT hemoglobin A1c    Comprehensive metabolic panel; Future    Lipid Panel with Direct LDL reflex; Future    Albumin / creatinine urine ratio; Future    Ambulatory Referral to Sleep Medicine; Future    dapagliflozin (Farxiga) 5 MG TABS; Take 1 tablet (5 mg total) by mouth daily    metFORMIN (GLUCOPHAGE) 500 mg tablet; Take 2 tablets (1,000 mg total) by mouth 2 (two) times a day with meals    Carter's esophagus without dysplasia  Chronic, stable  Recently had EGD with gastroenterology  Continue omeprazole 40 mg daily    Orders:    omeprazole (PriLOSEC) 40 MG capsule; Take 1 capsule (40 mg total) by mouth daily    Gastroesophageal reflux disease with esophagitis without hemorrhage  See documentation under \"Carter esophagus\"         Annual physical exam         Screening for lung cancer  I discussed with him that he is a candidate for lung cancer CT screening.     The following Shared Decision-Making points were covered:  Benefits of screening were discussed, including the rates of reduction in death from lung cancer and other causes.  Harms of screening were reviewed, including false positive tests, radiation exposure levels, risks of invasive procedures, risks of complications of screening, and risk of overdiagnosis.  I counseled on the importance of adherence to annual lung cancer LDCT " screening, impact of co-morbidities, and ability or willingness to undergo diagnosis and treatment.  I counseled on the importance of maintaining abstinence as a former smoker or was counseled on the importance of smoking cessation if a current smoker    Review of Eligibility Criteria: He meets all of the criteria for Lung Cancer Screening.   He is 54 y.o.   He has 20 pack year tobacco history and is a current smoker or has quit within the past 15 years  He presents no signs or symptoms of lung cancer    After discussion, the patient decided to elect lung cancer screening.    Orders:    CT lung screening program; Future    History of colonic polyps    Orders:    Ambulatory referral to Gastroenterology; Future    Encounter for diabetic foot exam (HCC)           Immunizations and preventive care screenings were discussed with patient today. Appropriate education was printed on patient's after visit summary.        Counseling:  Alcohol/drug use: discussed moderation in alcohol intake, the recommendations for healthy alcohol use, and avoidance of illicit drug use.  Dental Health: discussed importance of regular tooth brushing, flossing, and dental visits.  Injury prevention: discussed safety/seat belts, safety helmets, smoke detectors, carbon monoxide detectors, and smoking near bedding or upholstery.  Sexual health: discussed sexually transmitted diseases, partner selection, use of condoms, avoidance of unintended pregnancy, and contraceptive alternatives.  Exercise: the importance of regular exercise/physical activity was discussed. Recommend exercise 3-5 times per week for at least 30 minutes.          Return in about 6 months (around 7/30/2025) for Recheck DM.    The patient indicates understanding of these issues and agrees with the plan.                History of Present Illness     Adult Annual Physical:  Patient presents for annual physical.     Diet and Physical Activity:  - Diet/Nutrition: well balanced diet,  "frequent junk food and consuming 3-5 servings of fruits/vegetables daily.  - Exercise: moderate cardiovascular exercise and 1-2 times a week on average.    General Health:  - Sleep: sleeps well, 4-6 hours of sleep on average, snores loudly, unrefreshing sleep and witnessed apnea. unable to tolerate CPAP  - Hearing: normal hearing bilateral ears.  - Vision: goes for regular eye exams.  - Dental: regular dental visits.     Health:  - History of STDs: no.   - Urinary symptoms: none.     Advanced Care Planning:  - Has an advanced directive?: no    - Has a durable medical POA?: no    - ACP document given to patient?: no        Review of Systems   Constitutional:  Negative for appetite change, fatigue, fever and unexpected weight change.   HENT:  Negative for congestion, dental problem, ear pain, postnasal drip, sore throat and tinnitus.    Eyes:  Negative for pain, discharge and visual disturbance.   Respiratory:  Negative for cough, shortness of breath and wheezing.    Cardiovascular:  Negative for chest pain, palpitations and leg swelling.   Gastrointestinal:  Negative for abdominal pain, constipation, diarrhea, nausea and vomiting.   Endocrine: Negative for cold intolerance and heat intolerance.   Genitourinary:  Negative for difficulty urinating, dysuria, flank pain and urgency.   Musculoskeletal:  Negative for arthralgias, back pain, joint swelling and myalgias.        Intermittently notes small \"lump\" in left lower neck (anteriorly)   Skin:  Negative for rash and wound.   Allergic/Immunologic: Negative for immunocompromised state.   Neurological:  Negative for dizziness, syncope, speech difficulty, weakness and numbness.   Hematological:  Negative for adenopathy. Does not bruise/bleed easily.   Psychiatric/Behavioral:  Negative for confusion, dysphoric mood and sleep disturbance. The patient is not nervous/anxious.      Medical History Reviewed by provider this encounter:  Tobacco  Allergies  Meds  Problems  " "Med Hx  Surg Hx  Fam Hx     .    Objective   /60   Pulse 84   Temp 98 °F (36.7 °C)   Ht 5' 8\" (1.727 m)   Wt 69.6 kg (153 lb 6.4 oz)   SpO2 98%   BMI 23.32 kg/m²     Physical Exam  Vitals and nursing note reviewed.   Constitutional:       General: He is not in acute distress.     Appearance: Normal appearance. He is well-developed.      Comments: Body mass index is 23.32 kg/m².    HENT:      Head: Normocephalic and atraumatic.      Right Ear: Hearing, tympanic membrane, ear canal and external ear normal.      Left Ear: Hearing, tympanic membrane, ear canal and external ear normal.      Nose: Nose normal.      Mouth/Throat:      Mouth: Mucous membranes are moist.      Pharynx: Uvula midline. No oropharyngeal exudate.   Eyes:      General: No scleral icterus.     Conjunctiva/sclera: Conjunctivae normal.      Pupils: Pupils are equal, round, and reactive to light.   Neck:      Thyroid: No thyromegaly.   Cardiovascular:      Rate and Rhythm: Normal rate and regular rhythm.      Pulses: no weak pulses.           Dorsalis pedis pulses are 2+ on the right side and 2+ on the left side.        Posterior tibial pulses are 2+ on the right side and 2+ on the left side.      Heart sounds: Normal heart sounds. No murmur heard.  Pulmonary:      Effort: Pulmonary effort is normal. No respiratory distress.      Breath sounds: Normal breath sounds. No wheezing or rales.   Abdominal:      General: Bowel sounds are normal.      Palpations: Abdomen is soft. There is no mass.      Tenderness: There is no abdominal tenderness.   Musculoskeletal:         General: No tenderness. Normal range of motion.      Cervical back: Normal range of motion and neck supple.      Right lower leg: No edema.      Left lower leg: No edema.   Feet:      Right foot:      Skin integrity: No ulcer, skin breakdown, erythema, warmth, callus or dry skin.      Left foot:      Skin integrity: No ulcer, skin breakdown, erythema, warmth, callus or dry " skin.   Lymphadenopathy:      Cervical: No cervical adenopathy.   Skin:     General: Skin is warm and dry.      Coloration: Skin is not jaundiced.      Findings: No erythema or rash.   Neurological:      General: No focal deficit present.      Mental Status: He is alert and oriented to person, place, and time.      Cranial Nerves: No cranial nerve deficit.      Deep Tendon Reflexes: Reflexes are normal and symmetric.   Psychiatric:         Mood and Affect: Mood normal.         Behavior: Behavior normal.       Patient's shoes and socks removed.    Right Foot/Ankle   Right Foot Inspection  Skin Exam: skin normal and skin intact. No dry skin, no warmth, no callus, no erythema, no maceration, no abnormal color, no pre-ulcer, no ulcer and no callus.     Toe Exam: ROM and strength within normal limits.     Sensory   Vibration: intact  Proprioception: intact  Monofilament testing: intact    Vascular  Capillary refills: < 3 seconds  The right DP pulse is 2+. The right PT pulse is 2+.     Left Foot/Ankle  Left Foot Inspection  Skin Exam: skin normal and skin intact. No dry skin, no warmth, no erythema, no maceration, normal color, no pre-ulcer, no ulcer and no callus.     Toe Exam: ROM and strength within normal limits.     Sensory   Vibration: intact  Proprioception: intact  Monofilament testing: intact    Vascular  Capillary refills: < 3 seconds  The left DP pulse is 2+. The left PT pulse is 2+.     Assign Risk Category  No deformity present  No loss of protective sensation  No weak pulses  Risk: 0

## 2025-01-30 NOTE — TELEPHONE ENCOUNTER
01/30/25  Screened by: Argelia Bullock MA    Referring Provider Bob    Pre- Screening:     There is no height or weight on file to calculate BMI.  Has patient been referred for a routine screening Colonoscopy? yes  Is the patient between 45-75 years old? yes      Previous Colonoscopy yes   If yes:    Date: 2018    Facility:     Reason:       Does the patient want to see a Gastroenterologist prior to their procedure OR are they having any GI symptoms? no    Has the patient been hospitalized or had abdominal surgery in the past 6 months? no    Does the patient use supplemental oxygen? no    Does the patient take Coumadin, Lovenox, Plavix, Elliquis, Xarelto, or other blood thinning medication? no    Has the patient had a stroke, cardiac event, or stent placed in the past year? no

## 2025-01-30 NOTE — TELEPHONE ENCOUNTER
Scheduled date of EGD/colonoscopy (as of today): 10/22/25    Physician performing EGD/colonoscopy:  Jeny    Location of EGD/colonoscopy:  [facility]     Desired bowel prep reviewed with patient:  JLUIS    Instructions reviewed with patient by: SHARONDA    Clearances:  NA  DIABETIC-Farxiga    MAIL    Please mail to patient's home address

## 2025-01-30 NOTE — ASSESSMENT & PLAN NOTE
Lab Results   Component Value Date    HGBA1C 6.8 (A) 01/30/2025     Orders:    POCT hemoglobin A1c    Comprehensive metabolic panel; Future    Lipid Panel with Direct LDL reflex; Future    Albumin / creatinine urine ratio; Future    Ambulatory Referral to Sleep Medicine; Future    dapagliflozin (Farxiga) 5 MG TABS; Take 1 tablet (5 mg total) by mouth daily    metFORMIN (GLUCOPHAGE) 500 mg tablet; Take 2 tablets (1,000 mg total) by mouth 2 (two) times a day with meals

## 2025-01-30 NOTE — ASSESSMENT & PLAN NOTE
Alcohol consumption has remained unchanged  Patient reports that he is asymptomatic at this time  Will check LFTs for stability  Due this year for follow-up CT abdomen/pelvis

## 2025-01-30 NOTE — TELEPHONE ENCOUNTER
Mailed Miralax with Dulcolax instructions to pt's home address along with Diabetic instructions advising pt to hold Farxiga 4 days prior to the procedure.  Pt is also taking Metformin.

## 2025-01-30 NOTE — ASSESSMENT & PLAN NOTE
BP goal less than 140/90 (ideally below 130/80)  Continue lisinopril 10 mg daily  Continue to encourage patient to cut back on alcohol consumption    Orders:    lisinopril (ZESTRIL) 10 mg tablet; Take 1 tablet (10 mg total) by mouth daily

## 2025-01-30 NOTE — ASSESSMENT & PLAN NOTE
Chronic, stable  Recently had EGD with gastroenterology  Continue omeprazole 40 mg daily    Orders:    omeprazole (PriLOSEC) 40 MG capsule; Take 1 capsule (40 mg total) by mouth daily

## 2025-01-31 ENCOUNTER — RESULTS FOLLOW-UP (OUTPATIENT)
Age: 55
End: 2025-01-31

## 2025-01-31 DIAGNOSIS — E11.69 DYSLIPIDEMIA ASSOCIATED WITH TYPE 2 DIABETES MELLITUS  (HCC): ICD-10-CM

## 2025-01-31 DIAGNOSIS — E11.9 TYPE 2 DIABETES MELLITUS WITHOUT COMPLICATION, WITHOUT LONG-TERM CURRENT USE OF INSULIN (HCC): Primary | ICD-10-CM

## 2025-01-31 DIAGNOSIS — I10 ESSENTIAL HYPERTENSION: ICD-10-CM

## 2025-01-31 DIAGNOSIS — R79.89 ELEVATED LFTS: ICD-10-CM

## 2025-01-31 DIAGNOSIS — E78.5 DYSLIPIDEMIA ASSOCIATED WITH TYPE 2 DIABETES MELLITUS  (HCC): ICD-10-CM

## 2025-01-31 NOTE — RESULT ENCOUNTER NOTE
"Luz Maria Mcpherson,    I'm happy to report that your labs are stable.  I do note a slight increase in your liver enzymes (AST, ALT) and your \"bad\" cholesterol (LDL).  We will repeat these labs before your next visit in July -- orders are in the system.    Please continue your current medications and continue to engage in healthy lifestyle (diet, exercise).    Thanks!  Josie Kaur, DO  "

## 2025-06-11 NOTE — RESULT ENCOUNTER NOTE
Please inform pt that labs are stable -- elevation in liver enzymes has resolved  Continue to avoid alcohol and tylenol  Continue current medications and continue to engage in healthy lifestyle (diet, exercise)  Thanks!   Kylee Pratt
Quality 110: Preventive Care And Screening: Influenza Immunization: Influenza Immunization Administered during Influenza season
Quality 226: Preventive Care And Screening: Tobacco Use: Screening And Cessation Intervention: Patient screened for tobacco use and is an ex/non-smoker
Quality 402: Tobacco Use And Help With Quitting Among Adolescents: Patient screened for tobacco and never smoked
Detail Level: Detailed
Quality 130: Documentation Of Current Medications In The Medical Record: Current Medications Documented

## 2025-08-11 ENCOUNTER — OFFICE VISIT (OUTPATIENT)
Age: 55
End: 2025-08-11
Payer: COMMERCIAL

## 2025-08-11 ENCOUNTER — APPOINTMENT (OUTPATIENT)
Dept: LAB | Facility: CLINIC | Age: 55
End: 2025-08-11
Attending: FAMILY MEDICINE
Payer: COMMERCIAL

## 2025-08-11 PROBLEM — R61 NIGHT SWEATS: Status: ACTIVE | Noted: 2025-08-11
